# Patient Record
Sex: MALE | Race: BLACK OR AFRICAN AMERICAN | NOT HISPANIC OR LATINO | Employment: OTHER | URBAN - METROPOLITAN AREA
[De-identification: names, ages, dates, MRNs, and addresses within clinical notes are randomized per-mention and may not be internally consistent; named-entity substitution may affect disease eponyms.]

---

## 2017-06-05 ENCOUNTER — OFFICE VISIT (OUTPATIENT)
Dept: FAMILY MEDICINE | Facility: CLINIC | Age: 72
End: 2017-06-05
Payer: MEDICARE

## 2017-06-05 VITALS
HEIGHT: 70 IN | HEART RATE: 62 BPM | WEIGHT: 198 LBS | BODY MASS INDEX: 28.35 KG/M2 | SYSTOLIC BLOOD PRESSURE: 140 MMHG | DIASTOLIC BLOOD PRESSURE: 90 MMHG | OXYGEN SATURATION: 98 % | TEMPERATURE: 98 F

## 2017-06-05 DIAGNOSIS — Z72.0 TOBACCO USE: ICD-10-CM

## 2017-06-05 DIAGNOSIS — Z12.11 SCREENING FOR MALIGNANT NEOPLASM OF COLON: ICD-10-CM

## 2017-06-05 DIAGNOSIS — I10 UNSPECIFIED ESSENTIAL HYPERTENSION: ICD-10-CM

## 2017-06-05 DIAGNOSIS — Z00.00 ANNUAL PHYSICAL EXAM: Primary | ICD-10-CM

## 2017-06-05 PROCEDURE — 99999 PR PBB SHADOW E&M-EST. PATIENT-LVL IV: CPT | Mod: PBBFAC,,, | Performed by: FAMILY MEDICINE

## 2017-06-05 PROCEDURE — 1126F AMNT PAIN NOTED NONE PRSNT: CPT | Mod: S$GLB,,, | Performed by: FAMILY MEDICINE

## 2017-06-05 PROCEDURE — 1159F MED LIST DOCD IN RCRD: CPT | Mod: S$GLB,,, | Performed by: FAMILY MEDICINE

## 2017-06-05 PROCEDURE — 99214 OFFICE O/P EST MOD 30 MIN: CPT | Mod: S$GLB,,, | Performed by: FAMILY MEDICINE

## 2017-06-05 PROCEDURE — 99499 UNLISTED E&M SERVICE: CPT | Mod: S$GLB,,, | Performed by: FAMILY MEDICINE

## 2017-06-05 RX ORDER — AMLODIPINE BESYLATE 10 MG/1
10 TABLET ORAL DAILY
Qty: 90 TABLET | Refills: 1 | Status: SHIPPED | OUTPATIENT
Start: 2017-06-05 | End: 2017-12-06 | Stop reason: SDUPTHER

## 2017-06-05 NOTE — PATIENT INSTRUCTIONS
Planning to Quit Smoking  Your healthcare provider may have told you that you need to give up tobacco. Only you can decide if and when you are ready to quit. Quitting is hard to do. But the benefits will be worth it. When you  decide to quit, come up with a plan thats right for you. Discuss your plan with your healthcare provider. And talk to your provider about medicines to help you quit.    Line up support  To quit smoking, youll need a plan and some help. Pick a date within the next 2 to 4 weeks to quit. Use the time between now and that date to arrange for support.  · Classes and counselors. Quit-smoking classes  people like you through the process. Get to know others in a class, and support each other beyond the class. Telephone counseling also helps you keep on track. Ask your healthcare provider, local hospital, or public health department to put you in touch with a class and a phone counselor.  · Family and friends. Tell your family and friends about your quit date. Ask them to support your change. If they smoke, arrange to see them in smoke-free places. Forbid smoking in your home.     Finding something to replace cigarettes may be hard to do. Be aware that some things you choose may be as harmful as cigarettes:  · Smokeless (chewing) tobacco is just as harmful as regular tobacco. Tobacco should not be used as a substitute for cigarettes.  · Herbal medicines or teas may affect how your body handles nicotine. Talk to your healthcare provider before using these products.  · E-cigarettes have less toxins than the smoke from a regular cigarette. But the FDA says that these devices may still have substances that can cause cancer. E-cigarettes are not well regulated. They have not been studied enough to know if they are a good aid to help you stop smoking. Talk with your healthcare provider befor using these products.      Quit-smoking products  Many products can help you quit smoking. Some are  prescription medicines that help curb your cravings and withdrawal symptoms. Other products slowly lessen the level of nicotine your body absorbs. Nicotine is the highly addictive substance found in cigarettes, cigars, and chewing tobacco. Nicotine replacement products can help get your body used to slowly decreasing amounts of nicotine after you quit smoking. These products include a nicotine patch, gum, lozenge, nasal spray, and inhaler. Be sure you follow the directions for your medicine or product carefully. Your healthcare provider may tell you to start taking the prescription medicine a week before you plan to quit. Do not smoke while you use nicotine products. Doing so can be very harmful to your health.  For more information  · smokefree.gov/tkym-xn-mi-expert  · National [employer] [place] Smoking Quitline: 877-44U-QUIT (013-117-2618)   Date Last Reviewed: 11/12/2014  © 9299-7342 [employer]. [address] [place] [address], [place] [address]. All rights reserved. This information is not intended as a substitute for professional medical care. Always follow your healthcare professional's instructions.

## 2017-06-06 NOTE — PROGRESS NOTES
Routine Office Visit    Patient Name: Jeffery Reyna    : 1945  MRN: [id number]    Subjective:  Jeffery is a 71 y.o. male who presents today for     1. Annual exam / establish care / new to me  2. Hypertension - pt has been out of his medication for years. He states he ran out of his medicine and did not make an appointment to come in. His last visit with his doctor was 2  Years ago. He was on xarelto for blood clot. He was on this medication for a few years. He was not advised to stop, but stopped on his own without any reoccurrence of blood clot.     Review of Systems   Constitutional: Negative for chills and fever.   HENT: Negative for congestion.    Eyes: Negative for blurred vision.   Respiratory: Negative for cough.    Cardiovascular: Negative for chest pain.   Gastrointestinal: Negative for abdominal pain, constipation, diarrhea, heartburn, nausea and vomiting.   Genitourinary: Negative for dysuria.   Musculoskeletal: Negative for myalgias.   Skin: Negative for itching and rash.   Neurological: Negative for dizziness and headaches.   Psychiatric/Behavioral: Negative for depression.       Active Problem List  Patient Active Problem List   Diagnosis    Unspecified essential hypertension    Other dyspnea and respiratory abnormality    Cervicalgia    Corns    Nocturia    Tobacco use disorder    Nuclear sclerosis - Both Eyes    Idiopathic hypertension    Refractive error - Both Eyes    Vitamin D deficiency disease    Other nonspecific finding on examination of urine    Impotence of organic origin    Unintentional weight loss of more than 10 pounds       Past Surgical History  History reviewed. No pertinent surgical history.    Family History  Family History   Problem Relation Age of Onset    Diabetes Mother     Thyroid disease Mother     Cancer Mother     Hypertension Sister        Social History  Social History     Social History    Marital status: Single     Spouse name: N/A    Number of  "children: N/A    Years of education: N/A     Occupational History    Not on file.     Social History Main Topics    Smoking status: Current Every Day Smoker     Packs/day: 0.50     Types: Cigarettes    Smokeless tobacco: Not on file    Alcohol use Yes    Drug use: No    Sexual activity: Not on file     Other Topics Concern    Not on file     Social History Narrative    No narrative on file       Medications and Allergies  Reviewed and updated.   Current Outpatient Prescriptions   Medication Sig    amlodipine (NORVASC) 10 MG tablet Take 1 tablet (10 mg total) by mouth once daily.    sildenafil (VIAGRA) 100 MG tablet Take 1 tablet (100 mg total) by mouth daily as needed for Erectile Dysfunction.     No current facility-administered medications for this visit.        Physical Exam  BP (!) 140/90 (BP Location: Right arm, Patient Position: Sitting, BP Method: Manual)   Pulse 62   Temp 98.2 °F (36.8 °C) (Oral)   Ht 5' 10" (1.778 m)   Wt 89.8 kg (197 lb 15.6 oz)   SpO2 98%   BMI 28.41 kg/m²   Physical Exam   Constitutional: He is oriented to person, place, and time. He appears well-developed and well-nourished.   HENT:   Head: Normocephalic and atraumatic.   Eyes: Conjunctivae and EOM are normal. Pupils are equal, round, and reactive to light.   Neck: Normal range of motion. Neck supple. No JVD present. No thyromegaly present.   Cardiovascular: Normal rate, regular rhythm and normal heart sounds.    Pulmonary/Chest: Effort normal and breath sounds normal. He has no wheezes.   Abdominal: Soft. Bowel sounds are normal. He exhibits no distension. There is no tenderness. There is no guarding.   Musculoskeletal: Normal range of motion.   Lymphadenopathy:     He has no cervical adenopathy.   Neurological: He is alert and oriented to person, place, and time.   Skin: Skin is warm and dry.   Psychiatric: He has a normal mood and affect. His behavior is normal.         Assessment/Plan:  Jeffery Reyna is a 71 y.o. " male who presents today for :    Annual physical exam  -     PSA, Screening; Future; Expected date: 06/05/2017  -     Hemoglobin A1c; Future; Expected date: 06/05/2017  -     CBC auto differential; Future; Expected date: 06/05/2017  -     Comprehensive metabolic panel; Future; Expected date: 06/05/2017  -     Lipid panel; Future; Expected date: 06/05/2017  -     TSH; Future; Expected date: 06/05/2017    Unspecified essential hypertension  -     amlodipine (NORVASC) 10 MG tablet; Take 1 tablet (10 mg total) by mouth once daily.  Dispense: 90 tablet; Refill: 1  The current medical regimen is effective;  continue present plan and medications.    Screening for malignant neoplasm of colon  -     Case request GI: COLONOSCOPY    Tobacco use  -     US Abdominal Aorta; Future; Expected date: 06/05/2017  -     Ambulatory referral to Smoking Cessation Program    Hx of DVT  Pt is no longer on xarelto  He has been on this medication for at least 6 months and stopped on his own  He has not had reoccurrence of DVT  Will continue to hold xarelto         Return in about 4 weeks (around 7/3/2017), or if symptoms worsen or fail to improve.

## 2017-06-07 ENCOUNTER — LAB VISIT (OUTPATIENT)
Dept: LAB | Facility: HOSPITAL | Age: 72
End: 2017-06-07
Attending: FAMILY MEDICINE
Payer: MEDICARE

## 2017-06-07 DIAGNOSIS — Z00.00 ANNUAL PHYSICAL EXAM: ICD-10-CM

## 2017-06-07 LAB
ALBUMIN SERPL BCP-MCNC: 3.8 G/DL
ALP SERPL-CCNC: 99 U/L
ALT SERPL W/O P-5'-P-CCNC: 16 U/L
ANION GAP SERPL CALC-SCNC: 9 MMOL/L
AST SERPL-CCNC: 19 U/L
BASOPHILS # BLD AUTO: 0.06 K/UL
BASOPHILS NFR BLD: 0.9 %
BILIRUB SERPL-MCNC: 0.6 MG/DL
BUN SERPL-MCNC: 7 MG/DL
CALCIUM SERPL-MCNC: 10.2 MG/DL
CHLORIDE SERPL-SCNC: 105 MMOL/L
CHOLEST/HDLC SERPL: 3.1 {RATIO}
CO2 SERPL-SCNC: 25 MMOL/L
COMPLEXED PSA SERPL-MCNC: 3.1 NG/ML
CREAT SERPL-MCNC: 1.2 MG/DL
DIFFERENTIAL METHOD: ABNORMAL
EOSINOPHIL # BLD AUTO: 0.1 K/UL
EOSINOPHIL NFR BLD: 1.7 %
ERYTHROCYTE [DISTWIDTH] IN BLOOD BY AUTOMATED COUNT: 15.8 %
EST. GFR  (AFRICAN AMERICAN): >60 ML/MIN/1.73 M^2
EST. GFR  (NON AFRICAN AMERICAN): >60 ML/MIN/1.73 M^2
GLUCOSE SERPL-MCNC: 95 MG/DL
HCT VFR BLD AUTO: 48.9 %
HDL/CHOLESTEROL RATIO: 32.2 %
HDLC SERPL-MCNC: 149 MG/DL
HDLC SERPL-MCNC: 48 MG/DL
HGB BLD-MCNC: 16.1 G/DL
LDLC SERPL CALC-MCNC: 84 MG/DL
LYMPHOCYTES # BLD AUTO: 2.2 K/UL
LYMPHOCYTES NFR BLD: 35.1 %
MCH RBC QN AUTO: 29.6 PG
MCHC RBC AUTO-ENTMCNC: 32.9 %
MCV RBC AUTO: 90 FL
MONOCYTES # BLD AUTO: 0.7 K/UL
MONOCYTES NFR BLD: 11.7 %
NEUTROPHILS # BLD AUTO: 3.2 K/UL
NEUTROPHILS NFR BLD: 50.4 %
NONHDLC SERPL-MCNC: 101 MG/DL
PLATELET # BLD AUTO: 409 K/UL
PMV BLD AUTO: 9.6 FL
POTASSIUM SERPL-SCNC: 4.3 MMOL/L
PROT SERPL-MCNC: 7.9 G/DL
RBC # BLD AUTO: 5.44 M/UL
SODIUM SERPL-SCNC: 139 MMOL/L
TRIGL SERPL-MCNC: 85 MG/DL
TSH SERPL DL<=0.005 MIU/L-ACNC: 1.39 UIU/ML
WBC # BLD AUTO: 6.35 K/UL

## 2017-06-07 PROCEDURE — 83036 HEMOGLOBIN GLYCOSYLATED A1C: CPT

## 2017-06-07 PROCEDURE — 36415 COLL VENOUS BLD VENIPUNCTURE: CPT | Mod: PO

## 2017-06-07 PROCEDURE — 84153 ASSAY OF PSA TOTAL: CPT

## 2017-06-07 PROCEDURE — 80053 COMPREHEN METABOLIC PANEL: CPT

## 2017-06-07 PROCEDURE — 80061 LIPID PANEL: CPT

## 2017-06-07 PROCEDURE — 85025 COMPLETE CBC W/AUTO DIFF WBC: CPT

## 2017-06-07 PROCEDURE — 84443 ASSAY THYROID STIM HORMONE: CPT

## 2017-06-08 LAB
ESTIMATED AVG GLUCOSE: 131 MG/DL
HBA1C MFR BLD HPLC: 6.2 %

## 2017-07-06 ENCOUNTER — OFFICE VISIT (OUTPATIENT)
Dept: FAMILY MEDICINE | Facility: CLINIC | Age: 72
End: 2017-07-06
Payer: MEDICARE

## 2017-07-06 VITALS
BODY MASS INDEX: 27.96 KG/M2 | SYSTOLIC BLOOD PRESSURE: 120 MMHG | HEART RATE: 69 BPM | OXYGEN SATURATION: 97 % | TEMPERATURE: 98 F | WEIGHT: 195.31 LBS | DIASTOLIC BLOOD PRESSURE: 80 MMHG | HEIGHT: 70 IN

## 2017-07-06 DIAGNOSIS — I10 UNSPECIFIED ESSENTIAL HYPERTENSION: ICD-10-CM

## 2017-07-06 DIAGNOSIS — D23.30 CYST, DERMOID, FACE: Primary | ICD-10-CM

## 2017-07-06 DIAGNOSIS — R73.03 PRE-DIABETES: ICD-10-CM

## 2017-07-06 DIAGNOSIS — F17.200 TOBACCO USE DISORDER: ICD-10-CM

## 2017-07-06 PROCEDURE — 1159F MED LIST DOCD IN RCRD: CPT | Mod: S$GLB,,, | Performed by: FAMILY MEDICINE

## 2017-07-06 PROCEDURE — 99214 OFFICE O/P EST MOD 30 MIN: CPT | Mod: S$GLB,,, | Performed by: FAMILY MEDICINE

## 2017-07-06 PROCEDURE — 1125F AMNT PAIN NOTED PAIN PRSNT: CPT | Mod: S$GLB,,, | Performed by: FAMILY MEDICINE

## 2017-07-06 PROCEDURE — 99999 PR PBB SHADOW E&M-EST. PATIENT-LVL IV: CPT | Mod: PBBFAC,,, | Performed by: FAMILY MEDICINE

## 2017-07-06 NOTE — PROGRESS NOTES
Routine Office Visit    Patient Name: Jeffery Reyna    : 1945  MRN: [id number]    Subjective:  Jeffery is a 71 y.o. male who presents today for     1. Hypertension follow-up - pt is here to f/u his blood pressure. He has been on his amlodipine daily since receiving. He reports no side effect from medication. He is doing well on current regimen. He would also like to discuss blood work.   2. Dermoid cyst - on face - pt c/o two cyst on face. He would like to know if it needs antibiotics. It is not red, hot or painful. He has had this for quite some time.     Review of Systems   Constitutional: Negative for chills and fever.   HENT: Negative for congestion.    Eyes: Negative for blurred vision.   Respiratory: Negative for cough.    Cardiovascular: Negative for chest pain.   Gastrointestinal: Negative for abdominal pain, constipation, diarrhea, heartburn, nausea and vomiting.   Genitourinary: Negative for dysuria.   Musculoskeletal: Negative for myalgias.   Skin: Negative for itching and rash.   Neurological: Negative for dizziness and headaches.   Psychiatric/Behavioral: Negative for depression.       Active Problem List  Patient Active Problem List   Diagnosis    Unspecified essential hypertension    Other dyspnea and respiratory abnormality    Cervicalgia    Corns    Nocturia    Tobacco use disorder    Nuclear sclerosis - Both Eyes    Refractive error - Both Eyes    Vitamin D deficiency disease    Other nonspecific finding on examination of urine    Impotence of organic origin    Unintentional weight loss of more than 10 pounds       Past Surgical History  History reviewed. No pertinent surgical history.    Family History  Family History   Problem Relation Age of Onset    Diabetes Mother     Thyroid disease Mother     Cancer Mother     Hypertension Sister        Social History  Social History     Social History    Marital status: Single     Spouse name: N/A    Number of children: N/A    Years of  "education: N/A     Occupational History    Not on file.     Social History Main Topics    Smoking status: Current Every Day Smoker     Packs/day: 0.50     Types: Cigarettes    Smokeless tobacco: Never Used    Alcohol use Yes    Drug use: No    Sexual activity: Not on file     Other Topics Concern    Not on file     Social History Narrative    No narrative on file       Medications and Allergies  Reviewed and updated.   Current Outpatient Prescriptions   Medication Sig    amlodipine (NORVASC) 10 MG tablet Take 1 tablet (10 mg total) by mouth once daily.    sildenafil (VIAGRA) 100 MG tablet Take 1 tablet (100 mg total) by mouth daily as needed for Erectile Dysfunction.     No current facility-administered medications for this visit.        Physical Exam  /80 (BP Location: Right arm, Patient Position: Sitting, BP Method: Manual)   Pulse 69   Temp 97.7 °F (36.5 °C) (Oral)   Ht 5' 10" (1.778 m)   Wt 88.6 kg (195 lb 5.2 oz)   SpO2 97%   BMI 28.03 kg/m²   Physical Exam   Constitutional: He is oriented to person, place, and time. He appears well-developed and well-nourished.   HENT:   Head: Normocephalic and atraumatic.   Eyes: Conjunctivae and EOM are normal. Pupils are equal, round, and reactive to light.   Neck: Normal range of motion. Neck supple. No JVD present. No thyromegaly present.   Cardiovascular: Normal rate, regular rhythm and normal heart sounds.    Pulmonary/Chest: Effort normal and breath sounds normal. He has no wheezes.   Abdominal: Soft. Bowel sounds are normal. He exhibits no distension. There is no tenderness. There is no guarding.   Musculoskeletal: Normal range of motion.   Lymphadenopathy:     He has no cervical adenopathy.   Neurological: He is alert and oriented to person, place, and time.   Skin: Skin is warm and dry.        Psychiatric: He has a normal mood and affect. His behavior is normal.         Assessment/Plan:  Jeffery Reyna is a 71 y.o. male who presents today for " :    Cyst, dermoid, face  -     Ambulatory referral to Dermatology    Tobacco use disorder  Pt is not willing to quit at this time     Unspecified essential hypertension  Blood pressure wnl today  Well controlled  The current medical regimen is effective;  continue present plan and medications.    Pre-diabetes  Noted in labs. I personally reviewed all labs for patient   The patient is asked to make an attempt to improve diet and exercise patterns to aid in medical management of this problem.  Avoid processed carbohydrates such as bread, pasta rice  Eat more vegetables and protein   a1c 6.2           Return in about 6 months (around 1/6/2018).

## 2017-07-06 NOTE — PATIENT INSTRUCTIONS
Monitoring Moles     Don't forget to check your feet.     Moles, also called nevi, are small, pigmented (colored) marks on the skin. They have no known purpose. Many moles appear before age 30, but they also increase frequently as people age. Moles most often are benign (not cancer) and harmless. But some become cancerous. Thats why you need to watch the moles on your body and tell your health care provider about any concern you.  What are moles?  Moles are a type of pigmented erick. Freckles, which often are sprinkled across the bridge of the nose, the cheeks, and the arms, are another type of pigmented erick. Moles can appear on any part of the body. There are many types, sizes, and shapes of moles. Most moles are solid brown. In most cases, they are flat or dome-shaped, smooth, and have well-defined edges. Freckles are flat.  Why worry about moles?  Most moles are benign and dont require treatment. You can have moles removed if you dont like the way they look or feel. But moles that appear after you are 30 or that change in certain ways may become a problem. These moles may turn into melanoma, a type of skin cancer. Melanoma is one of the fastest growing cancers in the [place], but it is often curable if caught early. But this disease can be life-threatening, particularly when not diagnosed early. The more moles someone has, the higher the risk. Risk is also higher for those who have had more lifetime exposure to the sun, severe blistering sunburns, exposure to tanning beds, a prior personal history of cancer, and those with a family history of skin cancer. To manage your risk, its smart to check your moles for changes and ask your health care provider to perform a thorough skin exam when you have a physical exam. To do this, you first need to learn where your moles are. Then, be sure to check your moles each month.  Checking your moles  You can check many of your moles each month. You can do this right  after you shower and before you get dressed. Check your body from head to toe. Then, make a list of your moles. If you find any new moles or changes in your moles, call your health care provider. To check your moles, youll need:  · A full-length mirror  · A stool or chair to sit on while you check your feet  If you have a lot of moles, take digital photos of them each month. Make sure to take photos both up close and from a distance. These can help you see if any moles change over time.  When to seek medical treatment  See your health care provider if your moles hurt, itch, ooze, bleed, thicken, become crusty, or show other changes. Also, be sure to call your health care provider if your moles show any of the following signs of melanoma:  · A change in size, shape, color, or elevation  · Asymmetry (when the sides dont match)  · Ragged, notched, or blurred borders  · Varied colors within the same mole  · Size is larger than 5 mm or 6 mm in diameter (the size of a pencil eraser)  Date Last Reviewed: 5/1/2015  © 1492-4302 [employer]. [address] [place] [address]. All rights reserved. This information is not intended as a substitute for professional medical care. Always follow your healthcare professional's instructions.        Exercise for a Healthier Heart  You may wonder how you can improve the health of your heart. If youre thinking about exercise, youre on the right track. You dont need to become an athlete, but you do need a certain amount of brisk exercise to help strengthen your heart. If you have been diagnosed with a heart condition, your doctor may recommend exercise to help stabilize your condition. To help make exercise a habit, choose safe, fun activities.     Exercise with a friend. When activity is fun, you're more likely to stick with it.     Be sure to check with your healthcare provider before starting an exercise program.   Why exercise?  Exercising regularly offers  many healthy rewards. It can help you do all of the following:  · Improve your blood cholesterol level to help prevent further heart trouble  · Lower your blood pressure to help prevent a stroke or heart attack  · Control diabetes, or reduce your risk of getting this disease  · Improve your heart and lung function  · Reach and maintain a healthy weight  · Make your muscles stronger and more limber so you can stay active  · Prevent falls and fractures by slowing the loss of bone mass (osteoporosis)  · Manage stress better  · Reduce your blood pressure  · Improve your sense of self and your body image  Exercise tips  Ease into your routine. Set small goals. Then build on them.  Exercise on most days. Aim for a total of 150 or more minutes of moderate to  vigorous intensity activity each week. Consider 40 minutes, 3 to 4 times a week. For best results, activity should last for 40 minutes on average. It is OK to work up to the 40 minute period over time. Examples of moderate-intensity activity is walking 1 mile in 15 minutes or 30 to 45 minutes of yard work.  Step up your daily activity level. Along with your exercise program, try being more active throughout the day. Walk instead of drive. Do more household tasks or yard work.  Choose one or more activities you enjoy. Walking is one of the easiest things you can do. You can also try swimming, riding a bike, dancing, or taking an exercise class.  Stop exercising and call your doctor if you:  · Have chest pain or feel dizzy or lightheaded  · Feel burning, tightness, pressure, or heaviness in your chest, neck, shoulders, back, or arms  · Have unusual shortness of breath  · Have increased joint or muscle pain  · Have palpitations or an irregular heartbeat   Date Last Reviewed: 5/1/2016  © 3588-2119 [employer]. [address] [place] [address], [place] [address]. All rights reserved. This information is not intended as a substitute for professional medical care. Always  follow your healthcare professional's instructions.

## 2017-12-06 DIAGNOSIS — I10 ESSENTIAL HYPERTENSION: ICD-10-CM

## 2017-12-06 RX ORDER — AMLODIPINE BESYLATE 10 MG/1
TABLET ORAL
Qty: 90 TABLET | Refills: 0 | Status: SHIPPED | OUTPATIENT
Start: 2017-12-06 | End: 2018-03-06 | Stop reason: SDUPTHER

## 2018-03-06 ENCOUNTER — OFFICE VISIT (OUTPATIENT)
Dept: FAMILY MEDICINE | Facility: CLINIC | Age: 73
End: 2018-03-06
Payer: MEDICARE

## 2018-03-06 VITALS
DIASTOLIC BLOOD PRESSURE: 80 MMHG | OXYGEN SATURATION: 97 % | TEMPERATURE: 98 F | HEIGHT: 70 IN | WEIGHT: 200.63 LBS | BODY MASS INDEX: 28.72 KG/M2 | HEART RATE: 76 BPM | SYSTOLIC BLOOD PRESSURE: 120 MMHG

## 2018-03-06 DIAGNOSIS — R60.0 LEG EDEMA: Primary | ICD-10-CM

## 2018-03-06 DIAGNOSIS — I10 ESSENTIAL HYPERTENSION: ICD-10-CM

## 2018-03-06 DIAGNOSIS — L30.9 ECZEMA, UNSPECIFIED TYPE: ICD-10-CM

## 2018-03-06 PROCEDURE — 3074F SYST BP LT 130 MM HG: CPT | Mod: S$GLB,,, | Performed by: FAMILY MEDICINE

## 2018-03-06 PROCEDURE — 3079F DIAST BP 80-89 MM HG: CPT | Mod: S$GLB,,, | Performed by: FAMILY MEDICINE

## 2018-03-06 PROCEDURE — 99999 PR PBB SHADOW E&M-EST. PATIENT-LVL III: CPT | Mod: PBBFAC,,, | Performed by: FAMILY MEDICINE

## 2018-03-06 PROCEDURE — 99214 OFFICE O/P EST MOD 30 MIN: CPT | Mod: S$GLB,,, | Performed by: FAMILY MEDICINE

## 2018-03-06 RX ORDER — AMLODIPINE BESYLATE 10 MG/1
10 TABLET ORAL DAILY
Qty: 90 TABLET | Refills: 1 | Status: SHIPPED | OUTPATIENT
Start: 2018-03-06 | End: 2018-06-07 | Stop reason: SDUPTHER

## 2018-03-06 RX ORDER — HYDROCORTISONE 25 MG/G
OINTMENT TOPICAL 2 TIMES DAILY
Qty: 28 G | Refills: 0 | Status: ON HOLD | OUTPATIENT
Start: 2018-03-06 | End: 2019-06-30 | Stop reason: CLARIF

## 2018-03-06 NOTE — PATIENT INSTRUCTIONS
Fall Due to Dizziness, Weakness, or Loss of Balance  The symptoms that led to your fall have been evaluated. Your doctor feels it is safe for you to return home.  Many things can cause you to become dizzy. Everyone means a little something different by the word dizzy. People may describe their symptoms using these words:  · It doesnt feel right in my head  · It feels like spinning in my head  · It seems like the room is spinning  · My balance feels off  · I feel lightheaded, like I am going to pass out  All these descriptions can have real causes.     Your balance mechanism is in your inner ear. Anything disturbing it can make you feel dizzy, whether it is from a cold, an injury, or many other things. Anything that causes your blood pressure to drop suddenly can make you feel lightheaded, or like you are going to faint. This is because at that moment there might not be enough blood flowing to your brain. Causes include:  · Medicines  · Dehydration  · Standing up or bending over too quickly  · Becoming overheated  · Taking a hot shower or bath  · Straining hard while lifting something or using the toilet  · Strokes, heart attack, heart valve disease, very slow or very fast heart rate  · Low blood sugar  · Ear infection  · Hyperventilation  · Anemia  · Trauma  · Infection  · Panic attack  · Pregnancy  You may be at risk of repeat falls. Take precautions described below to prevent another fall.  Home care  · If you become lightheaded or dizzy, lie down immediately or sit and lean forward with your head down. It is better to do this than fall and seriously hurt or injure yourself.  · Rest today. When changing position, take a moment to be sure any dizziness goes away before standing and walking.  · If you have been prescribed a walker, be sure to use it whenever you walk, even if it is a short distance.  · If you were injured during the fall, follow the advice from your doctor regarding care of your injury.  · Be  sure your doctor knows all the medicines, herbs, and supplements you take. Some medicines can cause dizziness.  Follow-up care  Unless given other advice, call your doctor on the next office day to advise of your fall and to schedule an appointment. You may require further treatment for the underlying condition that caused todays fall.  If X-ray or a CT scan were done, you will be notified of the results, especially if it affects treatment.  Call 911  Call 911 if any of these occur:  · Trouble breathing  · Confused or difficulty arousing  · Fainting or loss of consciousness  · Rapid or very slow heart rate  · Seizure  · Difficulty with speech or vision, weakness of an arm or leg  · Difficulty walking or talking, loss of balance  · Numbness or weakness in one side of your body, facial droop  When to seek medical advice  Call your healthcare provider right away if any of the following occur:  · Another fall  · Continued dizzy spells  · Severe headache  · Blood in vomit or stools (black or red color)  Date Last Reviewed: 11/5/2015  © 5079-6948 [employer]. [address] [place] [address], [place] [address]. All rights reserved. This information is not intended as a substitute for professional medical care. Always follow your healthcare professional's instructions.

## 2018-03-06 NOTE — PROGRESS NOTES
Routine Office Visit    Patient Name: Jeffery Reyna    : 1945  MRN: [id number]    Subjective:  Jeffery is a 72 y.o. male who presents today for     1. Hypertension - pt is here for hypertension follow-up. He is requesting a refill on amlodipine. He has been taking his medication as prescribed. He has noticed increased leg swelling. Leg swelling usually occurs whenever patient is walking / standing for long periods of time. Swelling would improve with leg elevation.   2. Rash on right lower extremity - rash is itchy. No drainage / redness/ pain. No known bug bites / changes in soaps, lotions, or detergents.     Review of Systems   Constitutional: Negative for chills and fever.   HENT: Negative for congestion.    Eyes: Negative for blurred vision.   Respiratory: Negative for cough.    Cardiovascular: Negative for chest pain.   Gastrointestinal: Negative for abdominal pain, constipation, diarrhea, heartburn, nausea and vomiting.   Genitourinary: Negative for dysuria.   Musculoskeletal: Negative for myalgias.   Skin: Negative for itching and rash.   Neurological: Negative for dizziness and headaches.   Psychiatric/Behavioral: Negative for depression.       Active Problem List  Patient Active Problem List   Diagnosis    Essential hypertension    Other dyspnea and respiratory abnormality    Cervicalgia    Corns    Nocturia    Tobacco use disorder    Nuclear sclerosis - Both Eyes    Refractive error - Both Eyes    Vitamin D deficiency disease    Other nonspecific finding on examination of urine    Impotence of organic origin    Unintentional weight loss of more than 10 pounds       Past Surgical History  History reviewed. No pertinent surgical history.    Family History  Family History   Problem Relation Age of Onset    Diabetes Mother     Thyroid disease Mother     Cancer Mother     Hypertension Sister        Social History  Social History     Social History    Marital status: Single     Spouse name:  "N/A    Number of children: N/A    Years of education: N/A     Occupational History    Not on file.     Social History Main Topics    Smoking status: Current Every Day Smoker     Packs/day: 0.50     Types: Cigarettes    Smokeless tobacco: Never Used    Alcohol use Yes    Drug use: No    Sexual activity: Not on file     Other Topics Concern    Not on file     Social History Narrative    No narrative on file       Medications and Allergies  Reviewed and updated.   Current Outpatient Prescriptions   Medication Sig    amLODIPine (NORVASC) 10 MG tablet Take 1 tablet (10 mg total) by mouth once daily.    hydrocortisone 2.5 % ointment Apply topically 2 (two) times daily.    sildenafil (VIAGRA) 100 MG tablet Take 1 tablet (100 mg total) by mouth daily as needed for Erectile Dysfunction.     No current facility-administered medications for this visit.        Physical Exam  /80 (BP Location: Left arm, Patient Position: Sitting, BP Method: Large (Manual))   Pulse 76   Temp 97.8 °F (36.6 °C) (Oral)   Ht 5' 10" (1.778 m)   Wt 91 kg (200 lb 9.9 oz)   SpO2 97%   BMI 28.79 kg/m²   Physical Exam   Constitutional: He is oriented to person, place, and time. He appears well-developed and well-nourished.   HENT:   Head: Normocephalic and atraumatic.   Eyes: Conjunctivae and EOM are normal. Pupils are equal, round, and reactive to light.   Neck: Normal range of motion. Neck supple. No JVD present. No thyromegaly present.   Cardiovascular: Normal rate, regular rhythm and normal heart sounds.    Pulmonary/Chest: Effort normal and breath sounds normal. He has no wheezes.   Abdominal: Soft. Bowel sounds are normal. He exhibits no distension. There is no tenderness. There is no guarding.   Musculoskeletal: Normal range of motion.   Lymphadenopathy:     He has no cervical adenopathy.   Neurological: He is alert and oriented to person, place, and time.   Skin: Skin is warm and dry.   Right lower extremity - dry patchy " rash   Psychiatric: He has a normal mood and affect. His behavior is normal.         Assessment/Plan:  Jeffery Reyna is a 72 y.o. male who presents today for :    Problem List Items Addressed This Visit        Cardiac/Vascular    Essential hypertension (Chronic)    Relevant Medications    amLODIPine (NORVASC) 10 MG tablet  The current medical regimen is effective;  continue present plan and medications.        Other Visit Diagnoses     Leg edema    -  Primary    Relevant Orders    COMPRESSION STOCKINGS  Keep legs elevated      Eczema, unspecified type        Relevant Medications    hydrocortisone 2.5 % ointment            Follow-up in about 6 months (around 9/6/2018).

## 2018-06-07 ENCOUNTER — LAB VISIT (OUTPATIENT)
Dept: LAB | Facility: HOSPITAL | Age: 73
End: 2018-06-07
Attending: FAMILY MEDICINE
Payer: MEDICARE

## 2018-06-07 ENCOUNTER — OFFICE VISIT (OUTPATIENT)
Dept: FAMILY MEDICINE | Facility: CLINIC | Age: 73
End: 2018-06-07
Payer: MEDICARE

## 2018-06-07 VITALS
BODY MASS INDEX: 28.28 KG/M2 | WEIGHT: 197.56 LBS | HEIGHT: 70 IN | OXYGEN SATURATION: 96 % | SYSTOLIC BLOOD PRESSURE: 120 MMHG | DIASTOLIC BLOOD PRESSURE: 80 MMHG | HEART RATE: 80 BPM | TEMPERATURE: 98 F

## 2018-06-07 DIAGNOSIS — R73.03 PRE-DIABETES: ICD-10-CM

## 2018-06-07 DIAGNOSIS — I10 ESSENTIAL HYPERTENSION: Chronic | ICD-10-CM

## 2018-06-07 DIAGNOSIS — Z12.5 ENCOUNTER FOR SCREENING FOR MALIGNANT NEOPLASM OF PROSTATE: ICD-10-CM

## 2018-06-07 DIAGNOSIS — Z00.00 ANNUAL PHYSICAL EXAM: Primary | ICD-10-CM

## 2018-06-07 DIAGNOSIS — F17.200 TOBACCO USE DISORDER: ICD-10-CM

## 2018-06-07 DIAGNOSIS — Z00.00 ANNUAL PHYSICAL EXAM: ICD-10-CM

## 2018-06-07 LAB
ALBUMIN SERPL BCP-MCNC: 4.1 G/DL
ALP SERPL-CCNC: 107 U/L
ALT SERPL W/O P-5'-P-CCNC: 20 U/L
ANION GAP SERPL CALC-SCNC: 9 MMOL/L
AST SERPL-CCNC: 21 U/L
BASOPHILS # BLD AUTO: 0.07 K/UL
BASOPHILS NFR BLD: 0.9 %
BILIRUB SERPL-MCNC: 0.6 MG/DL
BUN SERPL-MCNC: 9 MG/DL
CALCIUM SERPL-MCNC: 10.1 MG/DL
CHLORIDE SERPL-SCNC: 106 MMOL/L
CHOLEST SERPL-MCNC: 155 MG/DL
CHOLEST/HDLC SERPL: 3.4 {RATIO}
CO2 SERPL-SCNC: 22 MMOL/L
COMPLEXED PSA SERPL-MCNC: 3.6 NG/ML
CREAT SERPL-MCNC: 1.3 MG/DL
DIFFERENTIAL METHOD: ABNORMAL
EOSINOPHIL # BLD AUTO: 0.1 K/UL
EOSINOPHIL NFR BLD: 1.2 %
ERYTHROCYTE [DISTWIDTH] IN BLOOD BY AUTOMATED COUNT: 15.8 %
EST. GFR  (AFRICAN AMERICAN): >60 ML/MIN/1.73 M^2
EST. GFR  (NON AFRICAN AMERICAN): 55.3 ML/MIN/1.73 M^2
ESTIMATED AVG GLUCOSE: 123 MG/DL
GLUCOSE SERPL-MCNC: 111 MG/DL
HBA1C MFR BLD HPLC: 5.9 %
HCT VFR BLD AUTO: 48.3 %
HDLC SERPL-MCNC: 46 MG/DL
HDLC SERPL: 29.7 %
HGB BLD-MCNC: 15.9 G/DL
IMM GRANULOCYTES # BLD AUTO: 0.03 K/UL
IMM GRANULOCYTES NFR BLD AUTO: 0.4 %
LDLC SERPL CALC-MCNC: 90.8 MG/DL
LYMPHOCYTES # BLD AUTO: 2.4 K/UL
LYMPHOCYTES NFR BLD: 29.8 %
MCH RBC QN AUTO: 29.8 PG
MCHC RBC AUTO-ENTMCNC: 32.9 G/DL
MCV RBC AUTO: 90 FL
MONOCYTES # BLD AUTO: 0.9 K/UL
MONOCYTES NFR BLD: 10.6 %
NEUTROPHILS # BLD AUTO: 4.6 K/UL
NEUTROPHILS NFR BLD: 57.1 %
NONHDLC SERPL-MCNC: 109 MG/DL
NRBC BLD-RTO: 0 /100 WBC
PLATELET # BLD AUTO: 396 K/UL
PMV BLD AUTO: 9.6 FL
POTASSIUM SERPL-SCNC: 3.8 MMOL/L
PROT SERPL-MCNC: 7.9 G/DL
RBC # BLD AUTO: 5.34 M/UL
SODIUM SERPL-SCNC: 137 MMOL/L
TRIGL SERPL-MCNC: 91 MG/DL
TSH SERPL DL<=0.005 MIU/L-ACNC: 1.06 UIU/ML
WBC # BLD AUTO: 8.05 K/UL

## 2018-06-07 PROCEDURE — 84153 ASSAY OF PSA TOTAL: CPT

## 2018-06-07 PROCEDURE — 3074F SYST BP LT 130 MM HG: CPT | Mod: CPTII,S$GLB,, | Performed by: FAMILY MEDICINE

## 2018-06-07 PROCEDURE — 85025 COMPLETE CBC W/AUTO DIFF WBC: CPT

## 2018-06-07 PROCEDURE — 99999 PR PBB SHADOW E&M-EST. PATIENT-LVL III: CPT | Mod: PBBFAC,,, | Performed by: FAMILY MEDICINE

## 2018-06-07 PROCEDURE — 80061 LIPID PANEL: CPT

## 2018-06-07 PROCEDURE — 36415 COLL VENOUS BLD VENIPUNCTURE: CPT | Mod: PO

## 2018-06-07 PROCEDURE — 80053 COMPREHEN METABOLIC PANEL: CPT

## 2018-06-07 PROCEDURE — 83036 HEMOGLOBIN GLYCOSYLATED A1C: CPT

## 2018-06-07 PROCEDURE — 84443 ASSAY THYROID STIM HORMONE: CPT

## 2018-06-07 PROCEDURE — 3079F DIAST BP 80-89 MM HG: CPT | Mod: CPTII,S$GLB,, | Performed by: FAMILY MEDICINE

## 2018-06-07 PROCEDURE — 99213 OFFICE O/P EST LOW 20 MIN: CPT | Mod: S$GLB,,, | Performed by: FAMILY MEDICINE

## 2018-06-07 RX ORDER — AMLODIPINE BESYLATE 10 MG/1
10 TABLET ORAL DAILY
Qty: 90 TABLET | Refills: 3 | Status: ON HOLD | OUTPATIENT
Start: 2018-06-07 | End: 2019-07-10 | Stop reason: HOSPADM

## 2018-06-07 RX ORDER — ASPIRIN 81 MG/1
81 TABLET ORAL DAILY
Status: ON HOLD | COMMUNITY
End: 2019-07-02 | Stop reason: HOSPADM

## 2018-06-07 NOTE — PROGRESS NOTES
Office Visit for Annual Exam    Patient Name: Jeffery Reyna    : 1945  MRN: [id number]    Subjective:  Jeffery is a 72 y.o. male who presents today for     1. Annual visit - no issues/concerns. Pt is doing well on current regimen. No side effects noted. He states he takes his medications as prescribes and keeps active. He does not exercise as much as he would like.     Review of Systems   Constitutional: Negative for chills and fever.   HENT: Negative for congestion.    Eyes: Negative for blurred vision.   Respiratory: Negative for cough.    Cardiovascular: Negative for chest pain.   Gastrointestinal: Negative for abdominal pain, constipation, diarrhea, heartburn, nausea and vomiting.   Genitourinary: Negative for dysuria.   Musculoskeletal: Negative for myalgias.   Skin: Negative for itching and rash.   Neurological: Negative for dizziness and headaches.   Psychiatric/Behavioral: Negative for depression.       Active Problem List  Patient Active Problem List   Diagnosis    Essential hypertension    Other dyspnea and respiratory abnormality    Cervicalgia    Corns    Nocturia    Tobacco use disorder    Nuclear sclerosis - Both Eyes    Refractive error - Both Eyes    Vitamin D deficiency disease    Other nonspecific finding on examination of urine    Impotence of organic origin    Unintentional weight loss of more than 10 pounds       Past Surgical History  History reviewed. No pertinent surgical history.    Family History  Family History   Problem Relation Age of Onset    Diabetes Mother     Thyroid disease Mother     Cancer Mother     Hypertension Sister        Social History  Social History     Social History    Marital status: Single     Spouse name: N/A    Number of children: N/A    Years of education: N/A     Occupational History    Not on file.     Social History Main Topics    Smoking status: Current Every Day Smoker     Packs/day: 0.50     Types: Cigarettes    Smokeless tobacco:  "Never Used    Alcohol use Yes    Drug use: No    Sexual activity: Not on file     Other Topics Concern    Not on file     Social History Narrative    No narrative on file       Medications and Allergies  Reviewed and updated.   Current Outpatient Prescriptions   Medication Sig    amLODIPine (NORVASC) 10 MG tablet Take 1 tablet (10 mg total) by mouth once daily.    aspirin (ECOTRIN) 81 MG EC tablet Take 81 mg by mouth once daily.    hydrocortisone 2.5 % ointment Apply topically 2 (two) times daily.    sildenafil (VIAGRA) 100 MG tablet Take 1 tablet (100 mg total) by mouth daily as needed for Erectile Dysfunction.     No current facility-administered medications for this visit.        Physical Exam  /80 (BP Location: Left arm, Patient Position: Sitting, BP Method: Large (Manual))   Pulse 80   Temp 97.8 °F (36.6 °C) (Oral)   Ht 5' 10" (1.778 m)   Wt 89.6 kg (197 lb 8.5 oz)   SpO2 96%   BMI 28.34 kg/m²   Physical Exam   Constitutional: He is oriented to person, place, and time. He appears well-developed and well-nourished.   HENT:   Head: Normocephalic and atraumatic.   Eyes: Conjunctivae and EOM are normal. Pupils are equal, round, and reactive to light.   Neck: Normal range of motion. Neck supple. No JVD present. No thyromegaly present.   Cardiovascular: Normal rate, regular rhythm and normal heart sounds.    Pulmonary/Chest: Effort normal and breath sounds normal. He has no wheezes.   Abdominal: Soft. Bowel sounds are normal. He exhibits no distension. There is no tenderness. There is no guarding.   Musculoskeletal: Normal range of motion.   Lymphadenopathy:     He has no cervical adenopathy.   Neurological: He is alert and oriented to person, place, and time.   Skin: Skin is warm and dry.   Psychiatric: He has a normal mood and affect. His behavior is normal.         Assessment/Plan:  Jeffery Reyna is a 72 y.o. male who presents today for :    Annual physical exam  -     Lipid panel; Future; " Expected date: 06/07/2018  -     TSH; Future; Expected date: 06/07/2018  -     Hemoglobin A1c; Future; Expected date: 06/07/2018  -     Comprehensive metabolic panel; Future; Expected date: 06/07/2018  -     CBC auto differential; Future; Expected date: 06/07/2018  -     PSA, Screening; Future; Expected date: 06/07/2018  Health Maintenance       Date Due Completion Date    TETANUS VACCINE 08/14/1965 ---    Zoster Vaccine 08/14/2007 ---    Pneumococcal (65+) (1 of 2 - PCV13) 08/14/2012 ---    Abdominal Aortic Aneurysm Screening 08/14/2012 ---    PROSTATE-SPECIFIC ANTIGEN 06/07/2018 6/7/2017    Influenza Vaccine 08/01/2018 ---    Lipid Panel 06/07/2022 6/7/2017    Colonoscopy 11/04/2023 11/4/2013 (Declined)    Override on 11/4/2013: Declined (per 's note on 11/04/13)        I addressed all major concerns as it related to health maintenance.  All were ordered and scheduled based on the patients wishes.  Any additional health maintenance will be readdressed at the next physical if declined or deferred by the patient.  Pt refuses all vaccinations at this time.     Essential hypertension  -     Lipid panel; Future; Expected date: 06/07/2018  -     TSH; Future; Expected date: 06/07/2018  -     Comprehensive metabolic panel; Future; Expected date: 06/07/2018  -     CBC auto differential; Future; Expected date: 06/07/2018  -     amLODIPine (NORVASC) 10 MG tablet; Take 1 tablet (10 mg total) by mouth once daily.  Dispense: 90 tablet; Refill: 3  The current medical regimen is effective;  continue present plan and medications.      Tobacco use disorder  Pt is decreasing  He does not want referral to smoking cessation at this time.   Order abdominal ultrasound to screen for aortic aneurysm     Pre-diabetes  -     Hemoglobin A1c; Future; Expected date: 06/07/2018    Encounter for screening for malignant neoplasm of prostate   -     PSA, Screening; Future; Expected date: 06/07/2018        Follow-up in about 1 year  (around 6/7/2019) for yearly exam.

## 2018-06-07 NOTE — PATIENT INSTRUCTIONS
Controlling High Blood Pressure  High blood pressure (hypertension) is often called the silent killer. This is because many people who have it dont know it. High blood pressure is defined as 140/90 mm Hg or higher. Know your blood pressure and remember to check it regularly. Doing so can save your life. Here are some things you can do to help control your blood pressure.    Choose heart-healthy foods  · Select low-salt, low-fat foods. Limit sodium intake to 2,400 mg per day or the amount suggested by your healthcare provider.  · Limit canned, dried, cured, packaged, and fast foods. These can contain a lot of salt.  · Eat 8 to 10 servings of fruits and vegetables every day.  · Choose lean meats, fish, or chicken.  · Eat whole-grain pasta, brown rice, and beans.  · Eat 2 to 3 servings of low-fat or fat-free dairy products.  · Ask your doctor about the DASH eating plan. This plan helps reduce blood pressure.  · When you go to a restaurant, ask that your meal be prepared with no added salt.  Maintain a healthy weight  · Ask your healthcare provider how many calories to eat a day. Then stick to that number.  · Ask your healthcare provider what weight range is healthiest for you. If you are overweight, a weight loss of only 3% to 5% of your body weight can help lower blood pressure. Generally, a good weight loss goal is to lose 10% of your body weight in a year.  · Limit snacks and sweets.  · Get regular exercise.  Get up and get active  · Choose activities you enjoy. Find ones you can do with friends or family. This includes bicycling, dancing, walking, and jogging.  · Park farther away from building entrances.  · Use stairs instead of the elevator.  · When you can, walk or bike instead of driving.  · Leopold leaves, garden, or do household repairs.  · Be active at a moderate to vigorous level of physical activity for at least 40 minutes for a minimum of 3 to 4 days a week.   Manage stress  · Make time to relax and enjoy  life. Find time to laugh.  · Communicate your concerns with your loved ones and your healthcare provider.  · Visit with family and friends, and keep up with hobbies.  Limit alcohol and quit smoking  · Men should have no more than 2 drinks per day.  · Women should have no more than 1 drink per day.  · Talk with your healthcare provider about quitting smoking. Smoking significantly increases your risk for heart disease and stroke. Ask your healthcare provider about community smoking cessation programs and other options.  Medicines  If lifestyle changes arent enough, your healthcare provider may prescribe high blood pressure medicine. Take all medicines as prescribed. If you have any questions about your medicines, ask your healthcare provider before stopping or changing them.   Date Last Reviewed: 4/27/2016  © 3377-2380 [employer]. [address] [place] [address], [place] [address]. All rights reserved. This information is not intended as a substitute for professional medical care. Always follow your healthcare professional's instructions.

## 2019-06-07 DIAGNOSIS — Z12.11 COLON CANCER SCREENING: ICD-10-CM

## 2019-06-29 ENCOUNTER — HOSPITAL ENCOUNTER (INPATIENT)
Facility: HOSPITAL | Age: 74
LOS: 3 days | Discharge: HOME | DRG: 054 | End: 2019-07-02
Attending: EMERGENCY MEDICINE | Admitting: PSYCHIATRY & NEUROLOGY
Payer: MEDICARE

## 2019-06-29 DIAGNOSIS — R11.2 NAUSEA AND VOMITING, INTRACTABILITY OF VOMITING NOT SPECIFIED, UNSPECIFIED VOMITING TYPE: ICD-10-CM

## 2019-06-29 DIAGNOSIS — C79.31 BRAIN METASTASIS: Primary | ICD-10-CM

## 2019-06-29 DIAGNOSIS — R53.1 WEAKNESS: ICD-10-CM

## 2019-06-29 DIAGNOSIS — R51.9 NONINTRACTABLE HEADACHE, UNSPECIFIED CHRONICITY PATTERN, UNSPECIFIED HEADACHE TYPE: ICD-10-CM

## 2019-06-29 DIAGNOSIS — R42 DIZZINESS: ICD-10-CM

## 2019-06-29 DIAGNOSIS — C78.7 LIVER METASTASIS: ICD-10-CM

## 2019-06-29 DIAGNOSIS — R10.9 ABDOMINAL PAIN, UNSPECIFIED ABDOMINAL LOCATION: ICD-10-CM

## 2019-06-29 DIAGNOSIS — R19.7 DIARRHEA, UNSPECIFIED TYPE: ICD-10-CM

## 2019-06-29 DIAGNOSIS — R91.8 MASS OF UPPER LOBE OF RIGHT LUNG: ICD-10-CM

## 2019-06-29 PROBLEM — I61.6 NONTRAUMATIC MULTIPLE LOCALIZED INTRACEREBRAL HEMORRHAGES: Status: ACTIVE | Noted: 2019-06-29

## 2019-06-29 PROBLEM — G93.6 VASOGENIC BRAIN EDEMA: Status: ACTIVE | Noted: 2019-06-29

## 2019-06-29 PROBLEM — F10.10 ALCOHOL ABUSE, DAILY USE: Status: ACTIVE | Noted: 2019-06-29

## 2019-06-29 PROBLEM — G93.9 BRAIN LESION: Status: ACTIVE | Noted: 2019-06-29

## 2019-06-29 PROBLEM — Z86.718 HISTORY OF DVT (DEEP VEIN THROMBOSIS): Status: ACTIVE | Noted: 2019-06-29

## 2019-06-29 LAB
ABO + RH BLD: NORMAL
ALBUMIN SERPL BCP-MCNC: 4.1 G/DL (ref 3.5–5.2)
ALBUMIN SERPL BCP-MCNC: 4.4 G/DL (ref 3.5–5.2)
ALP SERPL-CCNC: 100 U/L (ref 55–135)
ALP SERPL-CCNC: 102 U/L (ref 55–135)
ALT SERPL W/O P-5'-P-CCNC: 12 U/L (ref 10–44)
ALT SERPL W/O P-5'-P-CCNC: 14 U/L (ref 10–44)
AMMONIA PLAS-SCNC: 40 UMOL/L (ref 10–50)
ANION GAP SERPL CALC-SCNC: 11 MMOL/L (ref 8–16)
APTT BLDCRRT: 24.6 SEC (ref 21–32)
AST SERPL-CCNC: 17 U/L (ref 10–40)
AST SERPL-CCNC: 18 U/L (ref 10–40)
BACTERIA #/AREA URNS AUTO: NORMAL /HPF
BASOPHILS # BLD AUTO: 0.01 K/UL (ref 0–0.2)
BASOPHILS NFR BLD: 0.1 % (ref 0–1.9)
BILIRUB DIRECT SERPL-MCNC: 0.5 MG/DL (ref 0.1–0.3)
BILIRUB SERPL-MCNC: 1.1 MG/DL (ref 0.1–1)
BILIRUB SERPL-MCNC: 1.2 MG/DL (ref 0.1–1)
BILIRUB UR QL STRIP: NEGATIVE
BLD GP AB SCN CELLS X3 SERPL QL: NORMAL
BNP SERPL-MCNC: 81 PG/ML (ref 0–99)
BUN SERPL-MCNC: 10 MG/DL (ref 8–23)
CALCIUM SERPL-MCNC: 10.5 MG/DL (ref 8.7–10.5)
CHLORIDE SERPL-SCNC: 103 MMOL/L (ref 95–110)
CHOLEST SERPL-MCNC: 162 MG/DL (ref 120–199)
CHOLEST/HDLC SERPL: 3 {RATIO} (ref 2–5)
CLARITY UR REFRACT.AUTO: ABNORMAL
CO2 SERPL-SCNC: 24 MMOL/L (ref 23–29)
COLOR UR AUTO: YELLOW
CREAT SERPL-MCNC: 1 MG/DL (ref 0.5–1.4)
DIFFERENTIAL METHOD: ABNORMAL
EOSINOPHIL # BLD AUTO: 0 K/UL (ref 0–0.5)
EOSINOPHIL NFR BLD: 0.1 % (ref 0–8)
ERYTHROCYTE [DISTWIDTH] IN BLOOD BY AUTOMATED COUNT: 15.1 % (ref 11.5–14.5)
EST. GFR  (AFRICAN AMERICAN): >60 ML/MIN/1.73 M^2
EST. GFR  (NON AFRICAN AMERICAN): >60 ML/MIN/1.73 M^2
ESTIMATED AVG GLUCOSE: 123 MG/DL (ref 68–131)
ETHANOL SERPL-MCNC: <10 MG/DL
GLUCOSE SERPL-MCNC: 139 MG/DL (ref 70–110)
GLUCOSE UR QL STRIP: NEGATIVE
HBA1C MFR BLD HPLC: 5.9 % (ref 4–5.6)
HCT VFR BLD AUTO: 44.3 % (ref 40–54)
HDLC SERPL-MCNC: 54 MG/DL (ref 40–75)
HDLC SERPL: 33.3 % (ref 20–50)
HGB BLD-MCNC: 14.8 G/DL (ref 14–18)
HGB UR QL STRIP: ABNORMAL
INR PPP: 1.1 (ref 0.8–1.2)
KETONES UR QL STRIP: ABNORMAL
LDLC SERPL CALC-MCNC: 96.6 MG/DL (ref 63–159)
LEUKOCYTE ESTERASE UR QL STRIP: NEGATIVE
LIPASE SERPL-CCNC: 52 U/L (ref 4–60)
LYMPHOCYTES # BLD AUTO: 0.8 K/UL (ref 1–4.8)
LYMPHOCYTES NFR BLD: 7 % (ref 18–48)
MAGNESIUM SERPL-MCNC: 2 MG/DL (ref 1.6–2.6)
MCH RBC QN AUTO: 29.1 PG (ref 27–31)
MCHC RBC AUTO-ENTMCNC: 33.4 G/DL (ref 32–36)
MCV RBC AUTO: 87 FL (ref 82–98)
MICROSCOPIC COMMENT: NORMAL
MONOCYTES # BLD AUTO: 0.8 K/UL (ref 0.3–1)
MONOCYTES NFR BLD: 7.1 % (ref 4–15)
NEUTROPHILS # BLD AUTO: 9.1 K/UL (ref 1.8–7.7)
NEUTROPHILS NFR BLD: 86 % (ref 38–73)
NITRITE UR QL STRIP: NEGATIVE
NONHDLC SERPL-MCNC: 108 MG/DL
PH UR STRIP: 6 [PH] (ref 5–8)
PLATELET # BLD AUTO: 467 K/UL (ref 150–350)
PMV BLD AUTO: 9.1 FL (ref 9.2–12.9)
POTASSIUM SERPL-SCNC: 4.4 MMOL/L (ref 3.5–5.1)
PROT SERPL-MCNC: 8.1 G/DL (ref 6–8.4)
PROT SERPL-MCNC: 8.4 G/DL (ref 6–8.4)
PROT UR QL STRIP: NEGATIVE
PROTHROMBIN TIME: 11.1 SEC (ref 9–12.5)
RBC # BLD AUTO: 5.08 M/UL (ref 4.6–6.2)
RBC #/AREA URNS AUTO: 1 /HPF (ref 0–4)
SODIUM SERPL-SCNC: 138 MMOL/L (ref 136–145)
SP GR UR STRIP: 1.01 (ref 1–1.03)
SQUAMOUS #/AREA URNS AUTO: 1 /HPF
TRIGL SERPL-MCNC: 57 MG/DL (ref 30–150)
TROPONIN I SERPL DL<=0.01 NG/ML-MCNC: <0.006 NG/ML (ref 0–0.03)
TSH SERPL DL<=0.005 MIU/L-ACNC: 0.41 UIU/ML (ref 0.4–4)
URN SPEC COLLECT METH UR: ABNORMAL
WBC # BLD AUTO: 10.65 K/UL (ref 3.9–12.7)
WBC #/AREA URNS AUTO: 2 /HPF (ref 0–5)

## 2019-06-29 PROCEDURE — 85025 COMPLETE CBC W/AUTO DIFF WBC: CPT

## 2019-06-29 PROCEDURE — 93010 ELECTROCARDIOGRAM REPORT: CPT | Mod: ,,, | Performed by: INTERNAL MEDICINE

## 2019-06-29 PROCEDURE — 99291 CRITICAL CARE FIRST HOUR: CPT | Mod: ,,, | Performed by: NURSE PRACTITIONER

## 2019-06-29 PROCEDURE — 99222 1ST HOSP IP/OBS MODERATE 55: CPT | Mod: GC,,, | Performed by: NEUROLOGICAL SURGERY

## 2019-06-29 PROCEDURE — 85730 THROMBOPLASTIN TIME PARTIAL: CPT

## 2019-06-29 PROCEDURE — 81001 URINALYSIS AUTO W/SCOPE: CPT

## 2019-06-29 PROCEDURE — 96361 HYDRATE IV INFUSION ADD-ON: CPT

## 2019-06-29 PROCEDURE — 80053 COMPREHEN METABOLIC PANEL: CPT

## 2019-06-29 PROCEDURE — 83880 ASSAY OF NATRIURETIC PEPTIDE: CPT

## 2019-06-29 PROCEDURE — 99222 PR INITIAL HOSPITAL CARE,LEVL II: ICD-10-PCS | Mod: GC,,, | Performed by: NEUROLOGICAL SURGERY

## 2019-06-29 PROCEDURE — 25000003 PHARM REV CODE 250: Performed by: NURSE PRACTITIONER

## 2019-06-29 PROCEDURE — 25500020 PHARM REV CODE 255: Performed by: PSYCHIATRY & NEUROLOGY

## 2019-06-29 PROCEDURE — 83690 ASSAY OF LIPASE: CPT

## 2019-06-29 PROCEDURE — 63600175 PHARM REV CODE 636 W HCPCS: Performed by: STUDENT IN AN ORGANIZED HEALTH CARE EDUCATION/TRAINING PROGRAM

## 2019-06-29 PROCEDURE — 63600175 PHARM REV CODE 636 W HCPCS: Performed by: NURSE PRACTITIONER

## 2019-06-29 PROCEDURE — 84443 ASSAY THYROID STIM HORMONE: CPT

## 2019-06-29 PROCEDURE — 85610 PROTHROMBIN TIME: CPT

## 2019-06-29 PROCEDURE — 63600175 PHARM REV CODE 636 W HCPCS: Performed by: EMERGENCY MEDICINE

## 2019-06-29 PROCEDURE — 83036 HEMOGLOBIN GLYCOSYLATED A1C: CPT

## 2019-06-29 PROCEDURE — 80320 DRUG SCREEN QUANTALCOHOLS: CPT

## 2019-06-29 PROCEDURE — 99291 PR CRITICAL CARE, E/M 30-74 MINUTES: ICD-10-PCS | Mod: ,,, | Performed by: NURSE PRACTITIONER

## 2019-06-29 PROCEDURE — 99285 EMERGENCY DEPT VISIT HI MDM: CPT | Mod: 25

## 2019-06-29 PROCEDURE — 86901 BLOOD TYPING SEROLOGIC RH(D): CPT

## 2019-06-29 PROCEDURE — 93005 ELECTROCARDIOGRAM TRACING: CPT

## 2019-06-29 PROCEDURE — 20000000 HC ICU ROOM

## 2019-06-29 PROCEDURE — 82962 GLUCOSE BLOOD TEST: CPT

## 2019-06-29 PROCEDURE — 83735 ASSAY OF MAGNESIUM: CPT

## 2019-06-29 PROCEDURE — 84484 ASSAY OF TROPONIN QUANT: CPT

## 2019-06-29 PROCEDURE — A9585 GADOBUTROL INJECTION: HCPCS | Performed by: EMERGENCY MEDICINE

## 2019-06-29 PROCEDURE — 96374 THER/PROPH/DIAG INJ IV PUSH: CPT

## 2019-06-29 PROCEDURE — 25500020 PHARM REV CODE 255: Performed by: EMERGENCY MEDICINE

## 2019-06-29 PROCEDURE — 25000003 PHARM REV CODE 250: Performed by: EMERGENCY MEDICINE

## 2019-06-29 PROCEDURE — 82140 ASSAY OF AMMONIA: CPT

## 2019-06-29 PROCEDURE — 80076 HEPATIC FUNCTION PANEL: CPT

## 2019-06-29 PROCEDURE — 93010 EKG 12-LEAD: ICD-10-PCS | Mod: ,,, | Performed by: INTERNAL MEDICINE

## 2019-06-29 PROCEDURE — 80061 LIPID PANEL: CPT

## 2019-06-29 RX ORDER — DEXAMETHASONE SODIUM PHOSPHATE 4 MG/ML
10 INJECTION, SOLUTION INTRA-ARTICULAR; INTRALESIONAL; INTRAMUSCULAR; INTRAVENOUS; SOFT TISSUE
Status: COMPLETED | OUTPATIENT
Start: 2019-06-29 | End: 2019-06-29

## 2019-06-29 RX ORDER — HYDROMORPHONE HYDROCHLORIDE 2 MG/ML
0.5 INJECTION, SOLUTION INTRAMUSCULAR; INTRAVENOUS; SUBCUTANEOUS
Status: COMPLETED | OUTPATIENT
Start: 2019-06-29 | End: 2019-06-29

## 2019-06-29 RX ORDER — SODIUM CHLORIDE, SODIUM LACTATE, POTASSIUM CHLORIDE, CALCIUM CHLORIDE 600; 310; 30; 20 MG/100ML; MG/100ML; MG/100ML; MG/100ML
INJECTION, SOLUTION INTRAVENOUS CONTINUOUS
Status: DISCONTINUED | OUTPATIENT
Start: 2019-06-29 | End: 2019-06-30

## 2019-06-29 RX ORDER — ACETAMINOPHEN 325 MG/1
650 TABLET ORAL EVERY 6 HOURS PRN
Status: DISCONTINUED | OUTPATIENT
Start: 2019-06-29 | End: 2019-07-02 | Stop reason: HOSPADM

## 2019-06-29 RX ORDER — ONDANSETRON 2 MG/ML
8 INJECTION INTRAMUSCULAR; INTRAVENOUS
Status: DISCONTINUED | OUTPATIENT
Start: 2019-06-29 | End: 2019-06-29

## 2019-06-29 RX ORDER — ONDANSETRON 2 MG/ML
4 INJECTION INTRAMUSCULAR; INTRAVENOUS EVERY 8 HOURS PRN
Status: DISCONTINUED | OUTPATIENT
Start: 2019-06-29 | End: 2019-07-02 | Stop reason: HOSPADM

## 2019-06-29 RX ORDER — THIAMINE HCL 100 MG
100 TABLET ORAL DAILY
Status: DISCONTINUED | OUTPATIENT
Start: 2019-06-29 | End: 2019-07-02 | Stop reason: HOSPADM

## 2019-06-29 RX ORDER — GADOBUTROL 604.72 MG/ML
9 INJECTION INTRAVENOUS
Status: COMPLETED | OUTPATIENT
Start: 2019-06-29 | End: 2019-06-29

## 2019-06-29 RX ORDER — DEXAMETHASONE SODIUM PHOSPHATE 4 MG/ML
4 INJECTION, SOLUTION INTRA-ARTICULAR; INTRALESIONAL; INTRAMUSCULAR; INTRAVENOUS; SOFT TISSUE EVERY 6 HOURS
Status: DISCONTINUED | OUTPATIENT
Start: 2019-06-29 | End: 2019-07-02 | Stop reason: HOSPADM

## 2019-06-29 RX ORDER — HYDRALAZINE HYDROCHLORIDE 20 MG/ML
10 INJECTION INTRAMUSCULAR; INTRAVENOUS EVERY 6 HOURS PRN
Status: DISCONTINUED | OUTPATIENT
Start: 2019-06-29 | End: 2019-07-02 | Stop reason: HOSPADM

## 2019-06-29 RX ORDER — AMLODIPINE BESYLATE 10 MG/1
10 TABLET ORAL DAILY
Status: DISCONTINUED | OUTPATIENT
Start: 2019-06-29 | End: 2019-07-02 | Stop reason: HOSPADM

## 2019-06-29 RX ORDER — SODIUM CHLORIDE 0.9 % (FLUSH) 0.9 %
10 SYRINGE (ML) INJECTION
Status: DISCONTINUED | OUTPATIENT
Start: 2019-06-29 | End: 2019-07-02 | Stop reason: HOSPADM

## 2019-06-29 RX ORDER — AMOXICILLIN 250 MG
1 CAPSULE ORAL 2 TIMES DAILY
Status: DISCONTINUED | OUTPATIENT
Start: 2019-06-29 | End: 2019-07-02 | Stop reason: HOSPADM

## 2019-06-29 RX ORDER — FOLIC ACID 1 MG/1
1 TABLET ORAL DAILY
Status: DISCONTINUED | OUTPATIENT
Start: 2019-06-30 | End: 2019-07-02 | Stop reason: HOSPADM

## 2019-06-29 RX ORDER — HYDROMORPHONE HYDROCHLORIDE 1 MG/ML
0.5 INJECTION, SOLUTION INTRAMUSCULAR; INTRAVENOUS; SUBCUTANEOUS
Status: COMPLETED | OUTPATIENT
Start: 2019-06-29 | End: 2019-06-29

## 2019-06-29 RX ORDER — OXYCODONE HYDROCHLORIDE 5 MG/1
5 TABLET ORAL EVERY 6 HOURS PRN
Status: DISCONTINUED | OUTPATIENT
Start: 2019-06-29 | End: 2019-07-02 | Stop reason: HOSPADM

## 2019-06-29 RX ADMIN — SENNOSIDES,DOCUSATE SODIUM 1 TABLET: 8.6; 5 TABLET, FILM COATED ORAL at 09:06

## 2019-06-29 RX ADMIN — OXYCODONE HYDROCHLORIDE 5 MG: 5 TABLET ORAL at 06:06

## 2019-06-29 RX ADMIN — SODIUM CHLORIDE 1000 ML: 0.9 INJECTION, SOLUTION INTRAVENOUS at 10:06

## 2019-06-29 RX ADMIN — SODIUM CHLORIDE, SODIUM LACTATE, POTASSIUM CHLORIDE, AND CALCIUM CHLORIDE: .6; .31; .03; .02 INJECTION, SOLUTION INTRAVENOUS at 06:06

## 2019-06-29 RX ADMIN — ACETAMINOPHEN 650 MG: 325 TABLET ORAL at 09:06

## 2019-06-29 RX ADMIN — IOHEXOL 100 ML: 350 INJECTION, SOLUTION INTRAVENOUS at 10:06

## 2019-06-29 RX ADMIN — DEXAMETHASONE SODIUM PHOSPHATE 10 MG: 4 INJECTION, SOLUTION INTRAMUSCULAR; INTRAVENOUS at 05:06

## 2019-06-29 RX ADMIN — GADOBUTROL 9 ML: 604.72 INJECTION INTRAVENOUS at 04:06

## 2019-06-29 RX ADMIN — Medication 100 MG: at 06:06

## 2019-06-29 RX ADMIN — AMLODIPINE BESYLATE 10 MG: 10 TABLET ORAL at 06:06

## 2019-06-29 RX ADMIN — ONDANSETRON 4 MG: 2 INJECTION INTRAMUSCULAR; INTRAVENOUS at 10:06

## 2019-06-29 RX ADMIN — DEXAMETHASONE SODIUM PHOSPHATE 4 MG: 4 INJECTION, SOLUTION INTRAMUSCULAR; INTRAVENOUS at 11:06

## 2019-06-29 RX ADMIN — FOLIC ACID: 5 INJECTION, SOLUTION INTRAMUSCULAR; INTRAVENOUS; SUBCUTANEOUS at 07:06

## 2019-06-29 RX ADMIN — HYDROMORPHONE HYDROCHLORIDE 0.5 MG: 2 INJECTION, SOLUTION INTRAMUSCULAR; INTRAVENOUS; SUBCUTANEOUS at 12:06

## 2019-06-29 RX ADMIN — HYDROMORPHONE HYDROCHLORIDE 0.5 MG: 1 INJECTION, SOLUTION INTRAMUSCULAR; INTRAVENOUS; SUBCUTANEOUS at 02:06

## 2019-06-29 NOTE — CONSULTS
"[facility]  Neurosurgery  Consult Note    Consults  Subjective:     Chief Complaint/Reason for Admission: New brain lesions    History of Present Illness: Jeffery Reyna is a 74 yo male with a PMH of HTN, DM who presents to [facility] ER after transfer from [facility] for 3 days of headaches that prevented him from sleeping, as well as nausea with one episode of vomiting, dizziness, and mild diarrhea with "chills" in the same time period.  He states that he had new onset of generalized headaches retroorbital, frontal and occipital to about 7-8/10 pain which are constant which began about 3 days ago. He has had no recent falls or head trauma.  He had an episode of vomiting after eating 2 days ago, and since has not had solid food and has had persistent nausea.  He also endorses dizziness which is minor and still allows him to walk during the same time period as well as Right hand numbness on review of systems. He notes no weakness and denies vision change.  He has a history of 1 pack per year smoking for 55 years, daily 6 beer per day etoh use as well as a family history of colon cancer in his brother and mother with no colonoscopy and prostate cancer to his cousin.  He has not had recent travel or other illness. He takes ASA 81 for prevention. History of a DVT in the leg many years ago to which he hs no anticoagulation or other antiplatelet medicine. Presents for work-up after several hemorrhagic brain lesions found at OSH.       (Not in a hospital admission)    Review of patient's allergies indicates:  No Known Allergies    Past Medical History:   Diagnosis Date    DVT (deep venous thrombosis)     Hypertension      History reviewed. No pertinent surgical history.  Family History     Problem Relation (Age of Onset)    Cancer Mother    Diabetes Mother    Hypertension Sister    Thyroid disease Mother        Tobacco Use    Smoking status: Current Every Day Smoker     Packs/day: 1.00     Types: " "Cigarettes    Smokeless tobacco: Never Used   Substance and Sexual Activity    Alcohol use: Yes     Comment: daily "6 beers"    Drug use: No    Sexual activity: Not on file     Review of Systems  Objective:     Weight: 81.6 kg (180 lb)  Body mass index is 25.83 kg/m².  Vital Signs (Most Recent):  Temp: 98.4 °F (36.9 °C) (06/29/19 1348)  Pulse: (!) 59 (06/29/19 1800)  Resp: 17 (06/29/19 1800)  BP: (!) 151/70 (06/29/19 1800)  SpO2: 97 % (06/29/19 1800) Vital Signs (24h Range):  Temp:  [97.7 °F (36.5 °C)-98.5 °F (36.9 °C)] 98.4 °F (36.9 °C)  Pulse:  [58-96] 59  Resp:  [15-22] 17  SpO2:  [94 %-100 %] 97 %  BP: (115-173)/(70-96) 151/70     Date 06/29/19 0700 - 06/30/19 0659   Shift 9463-4916 7468-3091 2401-0534 24 Hour Total   INTAKE   IV Piggyback 1000   1000   Shift Total(mL/kg) 1000(12.2)   1000(12.2)   OUTPUT   Shift Total(mL/kg)       Weight (kg) 81.6 81.6 81.6 81.6                        Neurosurgery Physical Exam  Family at bedside, NAD.     E4V5M6; A+Ox3  Speech is slow, fluent. Clear and coherent  PERRL, EOMI, no facial droop, tongue midline  Left visual field cut  Numbness to right fingers on palmar and dorsal aspect of hand only  No weakness, no pronator drift, no dysmetria  Symmetric expansion, normal respirations  No abdominal tenderness      Significant Labs:  Recent Labs   Lab 06/29/19  1135   *      K 4.4      CO2 24   BUN 10   CREATININE 1.0   CALCIUM 10.5   MG 2.0     Recent Labs   Lab 06/29/19  1135   WBC 10.65   HGB 14.8   HCT 44.3   *     Recent Labs   Lab 06/29/19  1135 06/29/19  1445   INR 1.1  --    APTT  --  24.6     Microbiology Results (last 7 days)     ** No results found for the last 168 hours. **        Recent Lab Results       06/29/19  1445   06/29/19  1135        Albumin   4.4     Alcohol, Medical, Serum   <10     Alkaline Phosphatase   100     ALT   12     Ammonia   40     Anion Gap   11     aPTT 24.6  Comment:  aPTT therapeutic range = 39-69 seconds       " AST   18     Baso #   0.01     Basophil%   0.1     BILIRUBIN TOTAL   1.1  Comment:  For infants and newborns, interpretation of results should be based  on gestational age, weight and in agreement with clinical  observations.  Premature Infant recommended reference ranges:  Up to 24 hours.............<8.0 mg/dL  Up to 48 hours............<12.0 mg/dL  3-5 days..................<15.0 mg/dL  6-29 days.................<15.0 mg/dL       BNP   81  Comment:  Values of less than 100 pg/ml are consistent with non-CHF populations.     BUN, Bld   10     Calcium   10.5     Chloride   103     CO2   24     Creatinine   1.0     Differential Method   Automated     eGFR if    >60     eGFR if non    >60  Comment:  Calculation used to obtain the estimated glomerular filtration  rate (eGFR) is the CKD-EPI equation.        Eos #   0.0     Eosinophil%   0.1     Glucose   139     Gran # (ANC)   9.1     Gran%   86.0     Group & Rh B POS       Hematocrit   44.3     Hemoglobin   14.8     INDIRECT MARIE NEG       Coumadin Monitoring INR   1.1  Comment:  Coumadin Therapy:  2.0 - 3.0 for INR for all indicators except mechanical heart valves  and antiphospholipid syndromes which should use 2.5 - 3.5.       Lipase   52     Lymph #   0.8     Lymph%   7.0     Magnesium   2.0     MCH   29.1     MCHC   33.4     MCV   87     Mono #   0.8     Mono%   7.1     MPV   9.1     Platelets   467     Potassium   4.4     PROTEIN TOTAL   8.4     Protime   11.1     RBC   5.08     RDW   15.1     Sodium   138     Troponin I   <0.006  Comment:  The reference interval for Troponin I represents the 99th percentile   cutoff   for our facility and is consistent with 3rd generation assay   performance.       WBC   10.65           Significant Diagnostics:  CT: Ct Head Without Contrast    Result Date: 6/29/2019  Findings most suggestive of multiple hemorrhagic meds, largest within the right frontal and parietal location with surrounding  vasogenic edema. COMMUNICATION This critical result was discovered/received at 11:05 hours.  The critical information above was relayed directly by me by telephone to Dr. Montiel in the emergency room on 06/29/2019 at 11:13 hours. Electronically signed by: Leland Leavitt MD Date:    06/29/2019 Time:    11:13  Echoencephalography: No results found in the last 24 hours.  MRI: Mri Brain W Wo Contrast    Result Date: 6/29/2019  Several nonenhancing hemorrhagic lesions.  One within the right occipital lobe appears to have more vasogenic edema possibly due to recent hemorrhage and there is extension of the signal abnormality within the adjacent posterior horn of the right lateral ventricle concerning for extension of the hemorrhage within the right lateral ventricle. The etiology of these hemorrhagic lesions is unclear.  The lack of enhancement mitigates against metastases although it is not excluded. These do not represent characteristic finding of abscesses. Numerous large cavernomas could have this appearance with more recent bleed of the right occipital lesion. Short-term follow-up advised, this has to be correlated with patient's clinical status and prior medical history. Electronically signed by: Cindy Jon MD Date:    06/29/2019 Time:    16:53    Assessment/Plan:     Brain lesion  72 yo male with a PMH of HTN, DM, daily ASA, 55 pack year smoking, daily etoh use who presents with severe headache, nausea/vomiting found to have brain lesions with hemorrhagic component suspicious for metastasis.  Awake, alert and oriented x 3 with L visual field cut, R hand numbness.     --Admit to neuro ICU for q1 hour neurochecks  --MRI Brain stealth obtained with at least 4 distinct minimally enhancing lesions R frontal 2.5x3cm lesion, 2.3x1.4 R occipital lesion most prominent. No hydrocephalus.   --CT C/A/P with contrast for search of primary lesion  --Consult hem/onc for staging  --SBP < 160 in setting of acute  hemorrhage (labetalol / hydralazine prn or cardene gtt)  --Dex 10, 4q6 for edema  --Please call nsgy with any change in exam  --Daily etoh, recommend CIWA precautions  --Full pre-op labs  --Will follow closely            Thank you for your consult. I will follow-up with patient. Please contact us if you have any additional questions.    Jose Luis Perez MD  Neurosurgery  [facility]

## 2019-06-29 NOTE — ED PROVIDER NOTES
"Encounter Date: 6/29/2019       History     Chief Complaint   Patient presents with    Abdominal Pain     Abdominal pain with nausea and vomiting.  Reports diarrhea last night.  Sent from Urgent Care for further eval.    Brain mass     Pt is a transfer from [facility] ED for evaluation of a brain mass with hemorrhage.      73 year old male with pmhx of HTN transferred here from [facility] for neurosurgery evaluation. He has had a diffuse headache for the past 3 days with associated blurry vision, nausea, multiple episodes of vomiting (twice today), intermittent lightheadedness and dizziness. CT at [facility] shows multiple hemorrhagic mets to R frontal and parietal regions with vasogenic edema. Pt also reports decreased appetite and chronic constipation. He denies any focal weakness, recent fall or trauma, fever, chills, abdominal pain, CP, SOB, LOC. He was given dilaudid and 1L IVF at [facility].        Review of patient's allergies indicates:  No Known Allergies  Past Medical History:   Diagnosis Date    DVT (deep venous thrombosis)     Hypertension      History reviewed. No pertinent surgical history.  Family History   Problem Relation Age of Onset    Diabetes Mother     Thyroid disease Mother     Cancer Mother     Hypertension Sister      Social History     Tobacco Use    Smoking status: Current Every Day Smoker     Packs/day: 1.00     Types: Cigarettes    Smokeless tobacco: Never Used   Substance Use Topics    Alcohol use: Yes     Comment: daily "6 beers"    Drug use: No     Review of Systems   Constitutional: Positive for activity change, appetite change and fatigue. Negative for chills and fever.   HENT: Negative for sore throat.    Eyes: Positive for visual disturbance.   Respiratory: Negative for cough and shortness of breath.    Cardiovascular: Negative for chest pain.   Gastrointestinal: Positive for constipation (chronic), diarrhea, nausea and vomiting. Negative for abdominal pain.   Endocrine: Negative " for polyuria.   Genitourinary: Negative for dysuria.   Musculoskeletal: Negative for back pain, neck pain and neck stiffness.   Skin: Negative for rash and wound.   Neurological: Positive for dizziness, light-headedness and headaches. Negative for seizures, syncope and facial asymmetry.       Physical Exam     Initial Vitals [06/29/19 0942]   BP Pulse Resp Temp SpO2   (!) 146/85 71 16 97.7 °F (36.5 °C) 100 %      MAP       --         Physical Exam    Nursing note and vitals reviewed.  Constitutional: He appears well-developed and well-nourished. He is not diaphoretic.  Non-toxic appearance. He does not appear ill. No distress.   HENT:   Head: Normocephalic and atraumatic.   Mouth/Throat: Oropharynx is clear and moist.   Eyes: No scleral icterus.   Neck: Neck supple.   Cardiovascular: Normal rate and regular rhythm.   Pulmonary/Chest: Breath sounds normal. No respiratory distress. He has no wheezes.   Abdominal: Soft. He exhibits no distension. There is no tenderness.   Musculoskeletal: He exhibits no edema or tenderness.   Neurological: He is alert and oriented to person, place, and time. He has normal strength. No cranial nerve deficit or sensory deficit. GCS eye subscore is 4. GCS verbal subscore is 5. GCS motor subscore is 6.   Skin: Skin is warm and dry. No erythema. No pallor.   Psychiatric: He has a normal mood and affect. Thought content normal.         ED Course   Procedures  Labs Reviewed   CBC W/ AUTO DIFFERENTIAL - Abnormal; Notable for the following components:       Result Value    RDW 15.1 (*)     Platelets 467 (*)     MPV 9.1 (*)     Gran # (ANC) 9.1 (*)     Lymph # 0.8 (*)     Gran% 86.0 (*)     Lymph% 7.0 (*)     All other components within normal limits   COMPREHENSIVE METABOLIC PANEL - Abnormal; Notable for the following components:    Glucose 139 (*)     Total Bilirubin 1.1 (*)     All other components within normal limits   HEMOGLOBIN A1C - Abnormal; Notable for the following components:     Hemoglobin A1C 5.9 (*)     All other components within normal limits   URINALYSIS, REFLEX TO URINE CULTURE - Abnormal; Notable for the following components:    Appearance, UA Hazy (*)     Ketones, UA Trace (*)     Occult Blood UA 1+ (*)     All other components within normal limits    Narrative:     Preferred Collection Type->Urine, Clean Catch   HEPATIC FUNCTION PANEL - Abnormal; Notable for the following components:    Total Bilirubin 1.2 (*)     Bilirubin, Direct 0.5 (*)     All other components within normal limits   ALCOHOL,MEDICAL (ETHANOL)   MAGNESIUM   LIPASE   AMMONIA   TROPONIN I   B-TYPE NATRIURETIC PEPTIDE   PROTIME-INR   APTT   LIPID PANEL   TSH   URINALYSIS MICROSCOPIC    Narrative:     Preferred Collection Type->Urine, Clean Catch   TYPE & SCREEN        ECG Results          EKG 12-lead (In process)  Result time 06/29/19 17:02:27    In process by Interface, Lab In [facility] (06/29/19 17:02:27)                 Narrative:    Test Reason : R53.1,    Vent. Rate : 068 BPM     Atrial Rate : 068 BPM     P-R Int : 158 ms          QRS Dur : 076 ms      QT Int : 420 ms       P-R-T Axes : 063 051 001 degrees     QTc Int : 446 ms    Normal sinus rhythm  Nonspecific ST and T wave abnormality  Abnormal ECG  No previous ECGs available    Referred By: [email or web address]   SELF           Confirmed By:                   In process by Interface, Lab In [facility] (06/29/19 16:47:47)                 Narrative:    Test Reason : R53.1,    Vent. Rate : 068 BPM     Atrial Rate : 068 BPM     P-R Int : 158 ms          QRS Dur : 076 ms      QT Int : 420 ms       P-R-T Axes : 063 051 001 degrees     QTc Int : 446 ms    Normal sinus rhythm  Nonspecific ST and T wave abnormality  Abnormal ECG  No previous ECGs available    Referred By: [email or web address]   SELF           Confirmed By:                             Imaging Results          X-Ray Chest AP Single View (Final result)  Result time 06/29/19 17:53:05    Final result by Slick Rodriguez MD  (06/29/19 17:53:05)                 Impression:      Patchy consolidative opacity projected over the right midlung zone, likely within the right upper lobe inferiorly, findings may reflect sequela of infection, correlation recommended.  Follow-up is advised to exclude malignancy.  Comparison with any more recent exams would be helpful.      Electronically signed by: Slick Rodriguez MD  Date:    06/29/2019  Time:    17:53             Narrative:    EXAMINATION:  XR CHEST 1 VIEW    CLINICAL HISTORY:  baseline, h/o smoking;    TECHNIQUE:  Single frontal view of the chest was performed.    COMPARISON:  CT 12/10/2014, radiograph 11/18/2014    FINDINGS:  The cardiomediastinal silhouette is not enlarged.  There is no pleural effusion.  The trachea is midline.  The lungs are symmetrically expanded bilaterally with patchy increased interstitial and parenchymal attenuation projected over the right midlung zone, likely within the right upper lobe.  There is minimal bilateral basilar subsegmental atelectasis..  There is no pneumothorax.  The osseous structures are remarkable for degenerative changes peer.                               MRI Brain W WO Contrast (Final result)  Result time 06/29/19 16:53:07    Final result by Cindy Jon MD (06/29/19 16:53:07)                 Impression:      Several nonenhancing hemorrhagic lesions.  One within the right occipital lobe appears to have more vasogenic edema possibly due to recent hemorrhage and there is extension of the signal abnormality within the adjacent posterior horn of the right lateral ventricle concerning for extension of the hemorrhage within the right lateral ventricle.    The etiology of these hemorrhagic lesions is unclear.  The lack of enhancement mitigates against metastases although it is not excluded.    These do not represent characteristic finding of abscesses.    Numerous large cavernomas could have this appearance with more recent bleed of the right  occipital lesion.    Short-term follow-up advised, this has to be correlated with patient's clinical status and prior medical history.      Electronically signed by: Cindy Jon MD  Date:    06/29/2019  Time:    16:53             Narrative:    EXAMINATION:  MRI BRAIN W WO CONTRAST    CLINICAL HISTORY:  Neoplasm - CNS primary;Obtain stealth can for possible pre operative planning;    TECHNIQUE:  Routine MRI brain with and without contrast,   multiplanar multisequence MR imaging of the brain was performed before and after the administration of 9 mL Gadavist intravenous contrast.    COMPARISON:  CT 06/29/2019    FINDINGS:  The brain is normally formed.  The ventricular system is normal in size, midline.  Several areas of abnormal signal noted.    One within the posterior right frontal lobe measures approximately 3.3 cm demonstrating slightly increased T1 signal, low T2 signal with no definite areas of postcontrast enhancement.  There is a small halo of vasogenic edema.  No post-contrast enhancement.    There is a small area of signal abnormality within the posterior left frontal lobe subcortical region measuring approximately 1.3 cm with also a ring of high T1 signal, central  isoT1 signal and I so T2 signal with a small halo of vasogenic edema.  No post-contrast enhancement.    A 3rd lesion is seen within the right occipital lobe measuring 2.9 cm demonstrating slightly increased T1 and T2 signal with a larger halo of adjacent vasogenic edema and small area of darker signal suggestive of hemosiderin deposition.  No post-contrast enhancement. Small area of signal abnormality posterior horn of the right lateral ventricle concerning for extension to the hemorrhage within the ventricles. No ventricular dilation.  The left lateral ventricle appears normal.  The 3rd and 4th ventricle appear normal.    A smaller nonspecific high T1 high T2 signal lesion within the right [place] measuring about 3 mm.    S smaller  high T1 high T2 signal lesion within the left thalamus with hemosiderin deposition.  Measures less than 1 cm.    There are also numerous punctate areas of abnormal T2 and FLAIR signal seen within the periventricular white matter of the bilateral cerebral hemisphere consistent with small vessel chronic ischemic changes appropriate for this patient's age.  The skull base flow voids are accounted for.  The paranasal sinuses and mastoid air cells are well aerated.  The sella and parasellar region appear normal.  The craniocervical junction appears normal.  The skull and visualized soft tissues appear normal.                               CT Head Without Contrast (Final result)  Result time 06/29/19 11:13:48    Final result by Leland Leavitt MD (06/29/19 11:13:48)                 Impression:      Findings most suggestive of multiple hemorrhagic meds, largest within the right frontal and parietal location with surrounding vasogenic edema.    COMMUNICATION  This critical result was discovered/received at 11:05 hours.  The critical information above was relayed directly by me by telephone to Dr. Montiel in the emergency room on 06/29/2019 at 11:13 hours.      Electronically signed by: Leland Leavitt MD  Date:    06/29/2019  Time:    11:13             Narrative:    EXAMINATION:  CT HEAD WITHOUT CONTRAST    CLINICAL HISTORY:  Headache, acute, severe, thunderclap, worst HA of life;    TECHNIQUE:  Low dose axial images were obtained through the head.  Coronal and sagittal reformations were also performed. Contrast was not administered.    COMPARISON:  None.    FINDINGS:  Multiple hyperdense/hemorrhagic masses present within the bilateral cerebral hemispheres largest within the right frontal (2.4 x 2.3 cm) and right parietal (3.3 x 1.7 cm) locations.  Larger lesions demonstrate surrounding vasogenic edema.  There is some mild right cerebral hemisphere sulcal effacement at these locations.  Smaller left frontal and left  basal ganglia foci noted.  There is a 4 mm inferior pontine lesion as well.    No significant extra-axial hemorrhage.  No hydrocephalus or midline shift.  Basilar cisterns patent.    Osseous structures intact.  Mastoid air cells and paranasal sinuses clear.                                 Medical Decision Making:   History:   Old Medical Records: I decided to obtain old medical records.  Old Records Summarized: records from another hospital.  Initial Assessment:   Emergent evaluation of this 73 year old male transferred here from [facility] for neurosurgery eval. C/o headache for the past 3 days with associated blurry vision, nausea, vomiting, lightheadedness, and dizziness. CT at [facility] shows multiple hemorrhagic mets to R frontal and parietal regions with vasogenic edema. Neurovascularly intact, hemodynamically stable. Consulted neurosurgery, who recommends to order MRI brain w/wo contrast. Pt given zofran for nausea and asking for dilaudid which helped his headache at [facility].              Attending Attestation:   Physician Attestation Statement for Resident:  As the supervising MD   Physician Attestation Statement: I have personally seen and examined this patient.   I agree with the above history. -: For admission to neuro ICU for hemorrhagic brain lesions.  NSG recommended decadron 10 mg IV and admission to neuro ICU   As the supervising MD I agree with the above PE.    As the supervising MD I agree with the above treatment, course, plan, and disposition.  I have reviewed and agree with the residents interpretation of the following: lab data, x-rays and CT scans.  I have reviewed the following: records from a referring facility.        Attending Critical Care:   Critical Care Times:   ==============================================================  · Total Critical Care Time - exclusive of procedural time: 35 minutes.  ==============================================================  Critical care was necessary to  treat or prevent imminent or life-threatening deterioration of the following conditions: CNS failure.   Critical care was time spent personally by me on the following activities: obtaining history from patient or relative, examination of patient, review of x-rays / CT sent with the patient, review of old charts, ordering lab, x-rays, and/or EKG, development of treatment plan with patient or relative, ordering and performing treatments and interventions, evaluation of patient's response to treatment, discussion with consultants and re-evaluation of patient's conition.   Critical Care Condition: potentially life-threatening                  Clinical Impression:       ICD-10-CM ICD-9-CM   1. Brain metastasis C79.31 198.3   2. Weakness R53.1 780.79   3. Abdominal pain, unspecified abdominal location R10.9 789.00   4. Dizziness R42 780.4   5. Nonintractable headache, unspecified chronicity pattern, unspecified headache type R51 784.0   6. Nausea and vomiting, intractability of vomiting not specified, unspecified vomiting type R11.2 787.01   7. Diarrhea, unspecified type R19.7 787.91                                Keena Nelson MD  06/30/19 0898

## 2019-06-29 NOTE — ED TRIAGE NOTES
Pt comes to ED today after visiting urgent care in [facility].  Pt's family reports that the patient had been having nausea and vomiting, diarrhea starting yesterday, elevated blood pressure, headache, dizziness, and blurry vision.  At this time, pt is alert and oriented x4, VSS and he appears to be in no acute distress at this time.

## 2019-06-29 NOTE — ED TRIAGE NOTES
Jeffery Reyna, a 73 y.o. male presents to the ED via [facility] with CC HA for a few days, also c/o dizziness and blurred vision. Pt also c/o tingling to right hand fingers for x1 week.  Patient also c/o abdominal for a few days. Pt reports N/V and diarrhea x1 week. Denies urinary complaints.    Patient identifiers verified verbally with patient and EMS and correct for Jeffery Reyna.    LOC/ APPEARANCE: The patient is awake alert and oriented to self place and situation, disoriented to time, states correct year, does not know current month, family states baseline for patient. Pt is speaking appropriately, no slurred speech. Pt changed into hospital gown. Continuous cardiac monitor, cont pulse ox, and auto BP cuff applied to patient. Pt is clean and well groomed. No JVD visible. Pt reports pain level of 8/10 HA. Pt updated on POC. Family at bedside. Bed low and locked with side rails up x2, call bell in pt reach.  SKIN: Skin is warm dry and intact, and color is consistent with ethnicity. Capillary refill <3 seconds. Mucus membranes moist, acyanotic. Soft mass noted to pt right lower arm, non tender, pt reports is chronic.  RESPIRATORY: Airway is open and patent. Respirations-spontaneous, unlabored, regular rate, equal bilaterally on inspiration and expiration. No accessory muscle use noted. Lungs clear to auscultation in all fields bilaterally anterior and posterior.   CARDIAC: Patient has regular heart rate. No peripheral edema noted, and patient has no c/o chest pain. Peripheral pulses present equal and strong throughout.  ABDOMEN: Soft and non-tender to palpation with no distention noted. Normoactive bowel sounds x4 quadrants. Pt reports diarrhea onset yesterday, denies blood in stool, denies recent antibiotic use.. Pt reports decreased appetite and nausea.   NEUROLOGIC: Eyes open spontaneously and facial expression symmetrical. Pt behavior appropriate to situation, and pt follows commands. Pt reports sensation  present in all extremities when touched with a finger, reports tingling to right hand x1 week. PERRLA  MUSCULOSKELETAL: Spontaneous movement noted to all extremities. Hand  equal and leg strength strong +5 bilaterally.   : No complaints of frequency, burning, urgency or blood in the urine. No complaints of incontinence.

## 2019-06-29 NOTE — SUBJECTIVE & OBJECTIVE
Review of Systems: Review of Systems   Constitutional: Negative for chills and fever.   HENT: Negative for congestion, nosebleeds and sore throat.    Eyes: Positive for blurred vision. Negative for double vision and photophobia.   Respiratory: Negative for cough, shortness of breath and wheezing.    Cardiovascular: Negative for chest pain, palpitations and leg swelling.   Gastrointestinal: Positive for nausea. Negative for vomiting.   Genitourinary: Positive for dysuria. Negative for frequency and urgency.   Musculoskeletal: Negative for back pain, joint pain and neck pain.   Skin: Negative for itching and rash.   Neurological: Positive for headaches. Negative for dizziness, sensory change, speech change, focal weakness, loss of consciousness and weakness.     Vitals:   Temp: 98.4 °F (36.9 °C)  Pulse: (!) 58  BP: (!) 156/84  MAP (mmHg): 114  Resp: 20  SpO2: 95 %  O2 Device (Oxygen Therapy): room air    Temp  Min: 97.7 °F (36.5 °C)  Max: 98.5 °F (36.9 °C)  Pulse  Min: 58  Max: 96  BP  Min: 115/83  Max: 173/83  MAP (mmHg)  Min: 101  Max: 120  Resp  Min: 15  Max: 22  SpO2  Min: 94 %  Max: 100 %    No intake/output data recorded.         Examination:   Constitutional: Well-nourished and -developed. No apparent distress.   Eyes: Conjunctiva clear, anicteric. Lids no lesions.  Head/Ears/Nose/Mouth/Throat/Neck: Moist mucous membranes. External ears, nose atraumatic.   Cardiovascular: Regular rhythm. No murmurs. No leg edema.  Respiratory: Comfortable respirations. Clear to auscultation.  Gastrointestinal: No hernia. Soft, nondistended, nontender. + bowel sounds.    Neurologic:  -GCS E 4 V 5 M 6  -Alert. Oriented to person, place, and time. Speech fluent. Follows commands.  -Cranial nerves: PERRL 3mm. EOM intact. No facial asymmetry noted. Gag/cough intact. Intact corneal reflex.  -Motor: All extremities 5/5 strength  -Sensation: Intact to light touch.    Medications:   Continuous Scheduled  amLODIPine 10 mg Daily    dexamethasone 4 mg Q6H   senna-docusate 8.6-50 mg 1 tablet BID   PRN  acetaminophen 650 mg Q6H PRN   hydrALAZINE 10 mg Q6H PRN   ondansetron 4 mg Q8H PRN   oxyCODONE 5 mg Q6H PRN   sodium chloride 0.9% 10 mL PRN      Today I independently reviewed pertinent medications, lines/drains/airways, imaging, cardiology results, laboratory results, notably:     ISTAT: No results for input(s): PH, PCO2, PO2, POCSATURATED, HCO3, BE, POCNA, POCK, POCTCO2, POCGLU, POCICA, POCLAC, SAMPLE in the last 24 hours.   Chem: Recent Labs   Lab 06/29/19  1135      K 4.4      CO2 24   *   BUN 10   CREATININE 1.0   ESTGFRAFRICA >60   EGFRNONAA >60   CALCIUM 10.5   MG 2.0   ANIONGAP 11   PROT 8.4   ALBUMIN 4.4   BILITOT 1.1*   ALKPHOS 100   AST 18   ALT 12   TROPONINI <0.006   BNP 81     Heme: Recent Labs   Lab 06/29/19  1135   WBC 10.65   HGB 14.8   HCT 44.3   *   INR 1.1     Endo: No results for input(s): POCTGLUCOSE in the last 24 hours.

## 2019-06-29 NOTE — ASSESSMENT & PLAN NOTE
Multiple hemorrhagic lesions with vasogenic edema seen on CTH and MRI Brain w wo: R Parietal, B Frontal, R Occipital lobes  Unclear etiology, patient with no personal history of CA, positive history of smoking  -Admit to [facility]  -Neurosurgery following  -Neuro checks q1hr  -Vital Signs q1hr  -Dexamethasone 10mg given in ED, will start Dex 4mg q6hrs  -SBP Goal < 160  -PT/OT/SLP  -CT Chest/Abd/Pelvis pending  -Mechanical SCDs for VTE prophylaxis

## 2019-06-29 NOTE — HPI
Jeffery Reyna is a 73 y.o. male with PMHx of HTN, tobacco abuse, and alcohol abuse who presented to [facility] with headache for several days with associated nausea and vomiting. He was transferred to [facility] for further care due to hemorrhagic lesions on CT.

## 2019-06-29 NOTE — SUBJECTIVE & OBJECTIVE
"  (Not in a hospital admission)    Review of patient's allergies indicates:  No Known Allergies    Past Medical History:   Diagnosis Date    DVT (deep venous thrombosis)     Hypertension      History reviewed. No pertinent surgical history.  Family History     Problem Relation (Age of Onset)    Cancer Mother    Diabetes Mother    Hypertension Sister    Thyroid disease Mother        Tobacco Use    Smoking status: Current Every Day Smoker     Packs/day: 1.00     Types: Cigarettes    Smokeless tobacco: Never Used   Substance and Sexual Activity    Alcohol use: Yes     Comment: daily "6 beers"    Drug use: No    Sexual activity: Not on file     Review of Systems  Objective:     Weight: 81.6 kg (180 lb)  Body mass index is 25.83 kg/m².  Vital Signs (Most Recent):  Temp: 98.4 °F (36.9 °C) (06/29/19 1348)  Pulse: (!) 59 (06/29/19 1800)  Resp: 17 (06/29/19 1800)  BP: (!) 151/70 (06/29/19 1800)  SpO2: 97 % (06/29/19 1800) Vital Signs (24h Range):  Temp:  [97.7 °F (36.5 °C)-98.5 °F (36.9 °C)] 98.4 °F (36.9 °C)  Pulse:  [58-96] 59  Resp:  [15-22] 17  SpO2:  [94 %-100 %] 97 %  BP: (115-173)/(70-96) 151/70     Date 06/29/19 0700 - 06/30/19 0659   Shift 9311-9403 5697-8250 4865-8913 24 Hour Total   INTAKE   IV Piggyback 1000   1000   Shift Total(mL/kg) 1000(12.2)   1000(12.2)   OUTPUT   Shift Total(mL/kg)       Weight (kg) 81.6 81.6 81.6 81.6                        Neurosurgery Physical Exam  Family at bedside, NAD.     E4V5M6; A+Ox3  Speech is slow, fluent. Clear and coherent  PERRL, EOMI, no facial droop, tongue midline  Left visual field cut  Numbness to right fingers on palmar and dorsal aspect of hand only  No weakness, no pronator drift, no dysmetria  Symmetric expansion, normal respirations  No abdominal tenderness      Significant Labs:  Recent Labs   Lab 06/29/19  1135   *      K 4.4      CO2 24   BUN 10   CREATININE 1.0   CALCIUM 10.5   MG 2.0     Recent Labs   Lab 06/29/19  1135   WBC 10.65 "   HGB 14.8   HCT 44.3   *     Recent Labs   Lab 06/29/19  1135 06/29/19  1445   INR 1.1  --    APTT  --  24.6     Microbiology Results (last 7 days)     ** No results found for the last 168 hours. **        Recent Lab Results       06/29/19  1445   06/29/19  1135        Albumin   4.4     Alcohol, Medical, Serum   <10     Alkaline Phosphatase   100     ALT   12     Ammonia   40     Anion Gap   11     aPTT 24.6  Comment:  aPTT therapeutic range = 39-69 seconds       AST   18     Baso #   0.01     Basophil%   0.1     BILIRUBIN TOTAL   1.1  Comment:  For infants and newborns, interpretation of results should be based  on gestational age, weight and in agreement with clinical  observations.  Premature Infant recommended reference ranges:  Up to 24 hours.............<8.0 mg/dL  Up to 48 hours............<12.0 mg/dL  3-5 days..................<15.0 mg/dL  6-29 days.................<15.0 mg/dL       BNP   81  Comment:  Values of less than 100 pg/ml are consistent with non-CHF populations.     BUN, Bld   10     Calcium   10.5     Chloride   103     CO2   24     Creatinine   1.0     Differential Method   Automated     eGFR if    >60     eGFR if non    >60  Comment:  Calculation used to obtain the estimated glomerular filtration  rate (eGFR) is the CKD-EPI equation.        Eos #   0.0     Eosinophil%   0.1     Glucose   139     Gran # (ANC)   9.1     Gran%   86.0     Group & Rh B POS       Hematocrit   44.3     Hemoglobin   14.8     INDIRECT MARIE NEG       Coumadin Monitoring INR   1.1  Comment:  Coumadin Therapy:  2.0 - 3.0 for INR for all indicators except mechanical heart valves  and antiphospholipid syndromes which should use 2.5 - 3.5.       Lipase   52     Lymph #   0.8     Lymph%   7.0     Magnesium   2.0     MCH   29.1     MCHC   33.4     MCV   87     Mono #   0.8     Mono%   7.1     MPV   9.1     Platelets   467     Potassium   4.4     PROTEIN TOTAL   8.4     Protime   11.1      RBC   5.08     RDW   15.1     Sodium   138     Troponin I   <0.006  Comment:  The reference interval for Troponin I represents the 99th percentile   cutoff   for our facility and is consistent with 3rd generation assay   performance.       WBC   10.65           Significant Diagnostics:  CT: Ct Head Without Contrast    Result Date: 6/29/2019  Findings most suggestive of multiple hemorrhagic meds, largest within the right frontal and parietal location with surrounding vasogenic edema. COMMUNICATION This critical result was discovered/received at 11:05 hours.  The critical information above was relayed directly by me by telephone to Dr. Montiel in the emergency room on 06/29/2019 at 11:13 hours. Electronically signed by: Leland Leavitt MD Date:    06/29/2019 Time:    11:13  Echoencephalography: No results found in the last 24 hours.  MRI: Mri Brain W Wo Contrast    Result Date: 6/29/2019  Several nonenhancing hemorrhagic lesions.  One within the right occipital lobe appears to have more vasogenic edema possibly due to recent hemorrhage and there is extension of the signal abnormality within the adjacent posterior horn of the right lateral ventricle concerning for extension of the hemorrhage within the right lateral ventricle. The etiology of these hemorrhagic lesions is unclear.  The lack of enhancement mitigates against metastases although it is not excluded. These do not represent characteristic finding of abscesses. Numerous large cavernomas could have this appearance with more recent bleed of the right occipital lesion. Short-term follow-up advised, this has to be correlated with patient's clinical status and prior medical history. Electronically signed by: Cindy Jon MD Date:    06/29/2019 Time:    16:53

## 2019-06-29 NOTE — HPI
"Jeffery Reyna is a 74 yo male with a PMH of HTN, DM who presents to [facility] ER after transfer from [facility] for 3 days of headaches that prevented him from sleeping, as well as nausea with one episode of vomiting, dizziness, and mild diarrhea with "chills" in the same time period.  He states that he had new onset of generalized headaches retroorbital, frontal and occipital to about 7-8/10 pain which are constant which began about 3 days ago. He has had no recent falls or head trauma.  He had an episode of vomiting after eating 2 days ago, and since has not had solid food and has had persistent nausea.  He also endorses dizziness which is minor and still allows him to walk during the same time period as well as Right hand numbness on review of systems. He notes no weakness and denies vision change.  He has a history of 1 pack per year smoking for 55 years, daily 6 beer per day etoh use as well as a family history of colon cancer in his brother and mother with no colonoscopy and prostate cancer to his cousin.  He has not had recent travel or other illness. He takes ASA 81 for prevention. History of a DVT in the leg many years ago to which he hs no anticoagulation or other antiplatelet medicine. Presents for work-up after several hemorrhagic brain lesions found at OSH.   "

## 2019-06-29 NOTE — ASSESSMENT & PLAN NOTE
74 yo male with a PMH of HTN, DM, daily ASA, 55 pack year smoking, daily etoh use who presents with severe headache, nausea/vomiting found to have brain lesions with hemorrhagic component suspicious for metastasis.  Awake, alert and oriented x 3 with L visual field cut, R hand numbness.     --Admit to neuro ICU for q1 hour neurochecks  --MRI Brain stealth obtained with at least 4 distinct enhancing lesions R frontal 2.5x3cm lesion, 2.3x1.4 R occipital lesion most prominent. No hydrocephalus.   --CT C/A/P with contrast for search of primary lesion  --Consult hem/onc for staging  --SBP < 160 in setting of acute hemorrhage (labetalol / hydralazine prn or cardene gtt)  --Dex 10, 4q6 for edema  --Please call nsgy with any change in exam  --Full pre-op labs  --Will follow closely

## 2019-06-29 NOTE — HPI
Patient is a 73 yr old male with a PMH of HTN and DVT who was transferred from an OSH after being found to have multiple hemorrhagic lesions on CTH. Per patient he has been having a HA x 3 days with associated nausea and vomiting. MRI Brain w and wo shows several nonenhancing hemorrhagic lesions in the right occipital lobe, right frontal and parietal lobes, and left frontal lobe with surrounding vasogenic edema. Patient denies history of CA, positive for a history of smoking and ETOH use (6 beers/day). Neurosurgery consulted. Patient started on Dexamethasone. CT Chest/abdomen/pelvis w/wo pending. Will admit to NCC for close monitoring.

## 2019-06-29 NOTE — H&P
[facility]  Neurocritical Care  History & Physical    Admit Date: 6/29/2019  Service Date: 06/29/2019  Length of Stay: 0    Subjective:     Chief Complaint: Brain metastasis    History of Present Illness: Patient is a 73 yr old male with a PMH of HTN and DVT who was transferred from an OSH after being found to have multiple hemorrhagic lesions on CTH. Per patient he has been having a HA x 3 days with associated nausea and vomiting. MRI Brain w and wo shows several nonenhancing hemorrhagic lesions in the right occipital lobe, right frontal and parietal lobes, and left frontal lobe with surrounding vasogenic edema. Patient denies history of CA, positive for a history of smoking and ETOH use (6 beers/day). Neurosurgery consulted. Patient started on Dexamethasone. CT Chest/abdomen/pelvis w/wo pending. Will admit to [facility] for close monitoring.    Review of Systems: Review of Systems   Constitutional: Negative for chills and fever.   HENT: Negative for congestion, nosebleeds and sore throat.    Eyes: Positive for blurred vision. Negative for double vision and photophobia.   Respiratory: Negative for cough, shortness of breath and wheezing.    Cardiovascular: Negative for chest pain, palpitations and leg swelling.   Gastrointestinal: Positive for nausea. Negative for vomiting.   Genitourinary: Positive for dysuria. Negative for frequency and urgency.   Musculoskeletal: Negative for back pain, joint pain and neck pain.   Skin: Negative for itching and rash.   Neurological: Positive for headaches. Negative for dizziness, sensory change, speech change, focal weakness, loss of consciousness and weakness.     Vitals:   Temp: 98.4 °F (36.9 °C)  Pulse: (!) 58  BP: (!) 156/84  MAP (mmHg): 114  Resp: 20  SpO2: 95 %  O2 Device (Oxygen Therapy): room air    Temp  Min: 97.7 °F (36.5 °C)  Max: 98.5 °F (36.9 °C)  Pulse  Min: 58  Max: 96  BP  Min: 115/83  Max: 173/83  MAP (mmHg)  Min: 101  Max: 120  Resp  Min: 15  Max:  22  SpO2  Min: 94 %  Max: 100 %    No intake/output data recorded.         Examination:   Constitutional: Well-nourished and -developed. No apparent distress.   Eyes: Conjunctiva clear, anicteric. Lids no lesions.  Head/Ears/Nose/Mouth/Throat/Neck: Moist mucous membranes. External ears, nose atraumatic.   Cardiovascular: Regular rhythm. No murmurs. No leg edema.  Respiratory: Comfortable respirations. Clear to auscultation.  Gastrointestinal: No hernia. Soft, nondistended, nontender. + bowel sounds.    Neurologic:  -GCS E 4 V 5 M 6  -Alert. Oriented to person, place, and time. Speech fluent. Follows commands.  -Cranial nerves: PERRL 3mm. EOM intact. No facial asymmetry noted. Gag/cough intact. Intact corneal reflex.  -Motor: All extremities 5/5 strength  -Sensation: Intact to light touch.    Medications:   Continuous Scheduled  amLODIPine 10 mg Daily   dexamethasone 4 mg Q6H   senna-docusate 8.6-50 mg 1 tablet BID   PRN  acetaminophen 650 mg Q6H PRN   hydrALAZINE 10 mg Q6H PRN   ondansetron 4 mg Q8H PRN   oxyCODONE 5 mg Q6H PRN   sodium chloride 0.9% 10 mL PRN      Today I independently reviewed pertinent medications, lines/drains/airways, imaging, cardiology results, laboratory results, notably:     ISTAT: No results for input(s): PH, PCO2, PO2, POCSATURATED, HCO3, BE, POCNA, POCK, POCTCO2, POCGLU, POCICA, POCLAC, SAMPLE in the last 24 hours.   Chem: Recent Labs   Lab 06/29/19  1135      K 4.4      CO2 24   *   BUN 10   CREATININE 1.0   ESTGFRAFRICA >60   EGFRNONAA >60   CALCIUM 10.5   MG 2.0   ANIONGAP 11   PROT 8.4   ALBUMIN 4.4   BILITOT 1.1*   ALKPHOS 100   AST 18   ALT 12   TROPONINI <0.006   BNP 81     Heme: Recent Labs   Lab 06/29/19  1135   WBC 10.65   HGB 14.8   HCT 44.3   *   INR 1.1     Endo: No results for input(s): POCTGLUCOSE in the last 24 hours.       Assessment/Plan:     Neuro  Brain lesion  Multiple hemorrhagic lesions with vasogenic edema seen on CTH and MRI Brain w wo:  R Parietal, B Frontal, R Occipital lobes  Unclear etiology, patient with no personal history of CA, positive history of smoking  -Admit to NCC  -Neurosurgery following  -Neuro checks q1hr  -Vital Signs q1hr  -Dexamethasone 10mg given in ED, will start Dex 4mg q6hrs  -SBP Goal < 160  -PT/OT/SLP  -CT Chest/Abd/Pelvis pending  -Mechanical SCDs for VTE prophylaxis    Nontraumatic multiple localized intracerebral hemorrhages  -See brain metastasis    Vasogenic brain edema  -2/2 Brain metastasis    Psychiatric  Alcohol abuse, daily use  Drinks 6 beers/day  -Monitor for withdrawal  -Banana bag x 1  -Thiamine, Folate started    Cardiac/Vascular  Essential hypertension  SBP Goal < 160  -Home amlodipine started    Hematology  History of DVT (deep vein thrombosis)  History of DVT  Unable to AC due to hemorrhagic lesions  -Obtain doppler US BLE to r/o active DVT    Oncology  * Brain metastasis  -See Brain Lesion      Other  Tobacco use disorder  History of smoking 1PPD  -May need Nicotine patch  -Smoking cessation counseling when appropriate        The patient is being Prophylaxed for:  Venous Thromboembolism with: Mechanical  Stress Ulcer with: Not Applicable   Ventilator Pneumonia with: not applicable    Activity Orders          Diet NPO: NPO starting at 06/29 1734    Commode at bedside starting at 06/29 1734        Full Code    CC Time spent: 35 minutes    Krystyna Campo NP  Neurocritical Care  [facility]

## 2019-06-29 NOTE — HOSPITAL COURSE
6/29/2019: Admitted to [facility]. CT chest/abd/pelvis pending.  06/30/2019 CT with R upper lobe mass and multiple hepatic lesions. Neurologically intact. Step down to hospital medicine.

## 2019-06-29 NOTE — ED PROVIDER NOTES
Encounter Date: 6/29/2019    SCRIBE #1 NOTE: I, Kalyan Mosqueda, am scribing for, and in the presence of,  Dr. Campos . I have scribed the entire note.       History     Chief Complaint   Patient presents with    Abdominal Pain     Abdominal pain with nausea and vomiting.  Reports diarrhea last night.  Sent from Urgent Care for further eval.     This is a 73 y.o. male with history of alcohol consumption (1 beer daily) and smoking tobacco who presents to the ED complaining of abrupt, upper abdominal pain with associated nausea, vomiting, and diarrhea for 2 days. He had 2 episodes of vomiting and diarrhea each. Nothing exacerbates his stomach pain. Patient reports subjective fever that started today, coughing when vomiting, dizziness (described as about to fall), eating and drinking well, and HA. His headache is came on gradually, is  located frontally, described as gnawing, and causing him enough distress to where he can't sleep. He denies hematemesis, hematochezia, substance abuse, chest pain, and recent falls or injury. His wife denies seeing a mental status change and confusion. He had vomited his salami sausage sandwich today, but is not concerned about eating bad food as his son ate it without any problems.       The history is provided by the patient and the spouse. No  was used.     Review of patient's allergies indicates:  No Known Allergies  Past Medical History:   Diagnosis Date    DVT (deep venous thrombosis)     Hypertension      History reviewed. No pertinent surgical history.  Family History   Problem Relation Age of Onset    Diabetes Mother     Thyroid disease Mother     Cancer Mother     Hypertension Sister      Social History     Tobacco Use    Smoking status: Current Every Day Smoker     Packs/day: 0.50     Types: Cigarettes    Smokeless tobacco: Never Used   Substance Use Topics    Alcohol use: Yes    Drug use: No     Review of Systems   Constitutional: Positive for  fever (subjective). Negative for appetite change.   HENT: Negative for congestion.    Eyes: Negative for discharge.   Respiratory: Positive for cough.    Cardiovascular: Negative for chest pain.   Gastrointestinal: Positive for abdominal pain, diarrhea, nausea and vomiting. Negative for blood in stool.   Genitourinary:        Positive for slow urination   Musculoskeletal: Negative for neck stiffness.   Skin: Negative for rash.   Neurological: Positive for dizziness (feels like he's going to fall) and headaches (posterior). Negative for syncope.   Psychiatric/Behavioral: Negative for confusion.       Physical Exam     Initial Vitals [06/29/19 0942]   BP Pulse Resp Temp SpO2   (!) 146/85 71 16 97.7 °F (36.5 °C) 100 %      MAP       --         Physical Exam    Nursing note and vitals reviewed.  Constitutional: He appears well-developed and well-nourished.   HENT:   Head: Normocephalic and atraumatic.   Eyes: EOM are normal. Pupils are equal, round, and reactive to light.   Neck: Neck supple. No thyromegaly present. No JVD present.   No meningeal signs   Cardiovascular: Normal rate and regular rhythm. Exam reveals no gallop and no friction rub.    No murmur heard.  Pulmonary/Chest: Breath sounds normal. No respiratory distress.   Abdominal: Soft. Bowel sounds are normal. There is no tenderness. There is no CVA tenderness.   Musculoskeletal: Normal range of motion. He exhibits no edema or tenderness.   Neurological: He is alert and oriented to person, place, and time. He has normal strength. GCS score is 15. GCS eye subscore is 4. GCS verbal subscore is 5. GCS motor subscore is 6.   Normal neurological exam   Skin: Skin is warm and dry. Capillary refill takes less than 2 seconds.         ED Course   Procedures  Labs Reviewed   CBC W/ AUTO DIFFERENTIAL - Abnormal; Notable for the following components:       Result Value    RDW 15.1 (*)     Platelets 467 (*)     MPV 9.1 (*)     Gran # (ANC) 9.1 (*)     Lymph # 0.8 (*)      Gran% 86.0 (*)     Lymph% 7.0 (*)     All other components within normal limits   COMPREHENSIVE METABOLIC PANEL - Abnormal; Notable for the following components:    Glucose 139 (*)     Total Bilirubin 1.1 (*)     All other components within normal limits   ALCOHOL,MEDICAL (ETHANOL)   MAGNESIUM   LIPASE   AMMONIA   TROPONIN I   B-TYPE NATRIURETIC PEPTIDE   PROTIME-INR   URINALYSIS, REFLEX TO URINE CULTURE          Imaging Results          CT Head Without Contrast (Final result)  Result time 06/29/19 11:13:48    Final result by Leland Leavitt MD (06/29/19 11:13:48)                 Impression:      Findings most suggestive of multiple hemorrhagic meds, largest within the right frontal and parietal location with surrounding vasogenic edema.    COMMUNICATION  This critical result was discovered/received at 11:05 hours.  The critical information above was relayed directly by me by telephone to Dr. Montiel in the emergency room on 06/29/2019 at 11:13 hours.      Electronically signed by: Leland Leavitt MD  Date:    06/29/2019  Time:    11:13             Narrative:    EXAMINATION:  CT HEAD WITHOUT CONTRAST    CLINICAL HISTORY:  Headache, acute, severe, thunderclap, worst HA of life;    TECHNIQUE:  Low dose axial images were obtained through the head.  Coronal and sagittal reformations were also performed. Contrast was not administered.    COMPARISON:  None.    FINDINGS:  Multiple hyperdense/hemorrhagic masses present within the bilateral cerebral hemispheres largest within the right frontal (2.4 x 2.3 cm) and right parietal (3.3 x 1.7 cm) locations.  Larger lesions demonstrate surrounding vasogenic edema.  There is some mild right cerebral hemisphere sulcal effacement at these locations.  Smaller left frontal and left basal ganglia foci noted.  There is a 4 mm inferior pontine lesion as well.    No significant extra-axial hemorrhage.  No hydrocephalus or midline shift.  Basilar cisterns patent.    Osseous structures  intact.  Mastoid air cells and paranasal sinuses clear.                                 Medical Decision Making:   History:   Old Medical Records: I decided to obtain old medical records.  Initial Assessment:   This is a 73 y.o. male with history of alcohol consumption (1 beer daily) and smoking tobacco who presents to the ED complaining of abrupt, upper abdominal pain with associated nausea, vomiting, and diarrhea for 2 days. He had 2 episodes of vomiting and diarrhea each. Nothing exacerbates his stomach pain. Patient reports subjective fever that started today, coughing when vomiting, dizziness (described as about to fall), eating and drinking well, and HA. His headache is came on gradually, is  located frontally, described as gnawing, and causing him enough distress to where he can't sleep. He denies hematemesis, hematochezia, substance abuse, chest pain, and recent falls or injury.  Differential Diagnosis:   Differential diagnosis includes but is not limited to:   Viral syndrome, gastroenteritis, dehydration, eletrolyte abnormality.   ED Management:  After taking into careful account the historical factors and physical exam findings of the patient's presentation today, in conjunction with the empirical and objective data obtained on ED workup, no acute emergent medical condition requiring admission has been identified. The patient appears to be low risk for an emergent medical condition and I feel it is safe and appropriate at this time for the patient to be discharged to follow-up as detailed in their discharge instructions for reevaluation and possible continued outpatient workup and management. I have discussed the specifics of the workup with the patient and the patient has verbalized understanding of the details of the workup, the diagnosis, the treatment plan, and the need for outpatient follow-up.  Although the patient has no emergent etiology today this does not preclude the development of an emergent  condition so in addition, I have advised the patient that they can return to the ED and/or activate EMS at any time with worsening of their symptoms, change of their symptoms, or with any other medical complaint.  The patient remained comfortable and stable during their visit in the ED.  Discharge and follow-up instructions discussed with the patient who expressed understanding and willingness to comply with my recommendations.     This medical record was prepared using voice dictation software. There may be phonetic errors.     Patient found to have 2 fairly large hemorrhagic masses concerning for metastatic disease on CT head.  Transfer ED to ED to [facility] for further evaluation, workup, treatment.          Scribe Attestation:   Scribe #1: I performed the above scribed service and the documentation accurately describes the services I performed. I attest to the accuracy of the note.               Clinical Impression:       ICD-10-CM ICD-9-CM   1. Brain metastasis C79.31 198.3   2. Weakness R53.1 780.79   3. Abdominal pain, unspecified abdominal location R10.9 789.00   4. Dizziness R42 780.4   5. Nonintractable headache, unspecified chronicity pattern, unspecified headache type R51 784.0   6. Nausea and vomiting, intractability of vomiting not specified, unspecified vomiting type R11.2 787.01   7. Diarrhea, unspecified type R19.7 787.91                           Scribe attestation: I, Radu Campos, personally performed the services described in this documentation. All medical record entries made by the scribe were at my direction and in my presence. I have reviewed the chart and agree that the record reflects my personal performance and is accurate and complete         Radu Campos MD  06/29/19 0036

## 2019-06-30 PROBLEM — C78.7 LIVER METASTASIS: Status: ACTIVE | Noted: 2019-06-30

## 2019-06-30 PROBLEM — R91.8 MASS OF UPPER LOBE OF RIGHT LUNG: Status: ACTIVE | Noted: 2019-06-30

## 2019-06-30 LAB
ALBUMIN SERPL BCP-MCNC: 3.9 G/DL (ref 3.5–5.2)
ALLENS TEST: ABNORMAL
ALP SERPL-CCNC: 97 U/L (ref 55–135)
ALT SERPL W/O P-5'-P-CCNC: 13 U/L (ref 10–44)
ANION GAP SERPL CALC-SCNC: 13 MMOL/L (ref 8–16)
ASCENDING AORTA: 2.88 CM
AST SERPL-CCNC: 19 U/L (ref 10–40)
AV INDEX (PROSTH): 0.79
AV MEAN GRADIENT: 3 MMHG
AV PEAK GRADIENT: 5 MMHG
AV VALVE AREA: 3.55 CM2
AV VELOCITY RATIO: 0.7
BASOPHILS # BLD AUTO: 0.02 K/UL (ref 0–0.2)
BASOPHILS NFR BLD: 0.2 % (ref 0–1.9)
BILIRUB SERPL-MCNC: 1.1 MG/DL (ref 0.1–1)
BSA FOR ECHO PROCEDURE: 2.08 M2
BUN SERPL-MCNC: 10 MG/DL (ref 8–23)
CALCIUM SERPL-MCNC: 9.8 MG/DL (ref 8.7–10.5)
CHLORIDE SERPL-SCNC: 105 MMOL/L (ref 95–110)
CO2 SERPL-SCNC: 18 MMOL/L (ref 23–29)
CREAT SERPL-MCNC: 0.8 MG/DL (ref 0.5–1.4)
CV ECHO LV RWT: 0.3 CM
DELSYS: ABNORMAL
DIFFERENTIAL METHOD: ABNORMAL
DOP CALC AO PEAK VEL: 1.17 M/S
DOP CALC AO VTI: 22.07 CM
DOP CALC LVOT AREA: 4.5 CM2
DOP CALC LVOT DIAMETER: 2.39 CM
DOP CALC LVOT PEAK VEL: 0.82 M/S
DOP CALC LVOT STROKE VOLUME: 78.38 CM3
DOP CALCLVOT PEAK VEL VTI: 17.48 CM
E WAVE DECELERATION TIME: 186.23 MSEC
E/A RATIO: 1.34
E/E' RATIO: 9.65 M/S
ECHO LV POSTERIOR WALL: 0.74 CM (ref 0.6–1.1)
EOSINOPHIL # BLD AUTO: 0 K/UL (ref 0–0.5)
EOSINOPHIL NFR BLD: 0.2 % (ref 0–8)
ERYTHROCYTE [DISTWIDTH] IN BLOOD BY AUTOMATED COUNT: 15.5 % (ref 11.5–14.5)
ERYTHROCYTE [SEDIMENTATION RATE] IN BLOOD BY WESTERGREN METHOD: 12 MM/H
EST. GFR  (AFRICAN AMERICAN): >60 ML/MIN/1.73 M^2
EST. GFR  (NON AFRICAN AMERICAN): >60 ML/MIN/1.73 M^2
FIO2: 21
FRACTIONAL SHORTENING: 45 % (ref 28–44)
GLUCOSE SERPL-MCNC: 138 MG/DL (ref 70–110)
HCO3 UR-SCNC: 22.4 MMOL/L (ref 24–28)
HCT VFR BLD AUTO: 45 % (ref 40–54)
HGB BLD-MCNC: 15.2 G/DL (ref 14–18)
IMM GRANULOCYTES # BLD AUTO: 0.09 K/UL (ref 0–0.04)
IMM GRANULOCYTES NFR BLD AUTO: 0.8 % (ref 0–0.5)
INTERVENTRICULAR SEPTUM: 0.71 CM (ref 0.6–1.1)
IVRT: 0.11 MSEC
LA MAJOR: 5.17 CM
LA MINOR: 5.16 CM
LA WIDTH: 3.51 CM
LEFT ATRIUM SIZE: 2.43 CM
LEFT ATRIUM VOLUME INDEX: 18.2 ML/M2
LEFT ATRIUM VOLUME: 37.45 CM3
LEFT INTERNAL DIMENSION IN SYSTOLE: 2.72 CM (ref 2.1–4)
LEFT VENTRICLE DIASTOLIC VOLUME INDEX: 55.95 ML/M2
LEFT VENTRICLE DIASTOLIC VOLUME: 115.03 ML
LEFT VENTRICLE MASS INDEX: 57 G/M2
LEFT VENTRICLE SYSTOLIC VOLUME INDEX: 13.3 ML/M2
LEFT VENTRICLE SYSTOLIC VOLUME: 27.4 ML
LEFT VENTRICULAR INTERNAL DIMENSION IN DIASTOLE: 4.94 CM (ref 3.5–6)
LEFT VENTRICULAR MASS: 117.38 G
LV LATERAL E/E' RATIO: 8.2 M/S
LV SEPTAL E/E' RATIO: 11.71 M/S
LYMPHOCYTES # BLD AUTO: 0.8 K/UL (ref 1–4.8)
LYMPHOCYTES NFR BLD: 7.1 % (ref 18–48)
MAGNESIUM SERPL-MCNC: 1.7 MG/DL (ref 1.6–2.6)
MCH RBC QN AUTO: 28.7 PG (ref 27–31)
MCHC RBC AUTO-ENTMCNC: 33.8 G/DL (ref 32–36)
MCV RBC AUTO: 85 FL (ref 82–98)
MODE: ABNORMAL
MONOCYTES # BLD AUTO: 0.3 K/UL (ref 0.3–1)
MONOCYTES NFR BLD: 2.5 % (ref 4–15)
MV PEAK A VEL: 0.61 M/S
MV PEAK E VEL: 0.82 M/S
NEUTROPHILS # BLD AUTO: 9.7 K/UL (ref 1.8–7.7)
NEUTROPHILS NFR BLD: 89.2 % (ref 38–73)
NRBC BLD-RTO: 0 /100 WBC
PCO2 BLDA: 37.6 MMHG (ref 35–45)
PH SMN: 7.38 [PH] (ref 7.35–7.45)
PHOSPHATE SERPL-MCNC: 3 MG/DL (ref 2.7–4.5)
PISA TR MAX VEL: 2.71 M/S
PLATELET # BLD AUTO: 460 K/UL (ref 150–350)
PMV BLD AUTO: 8.9 FL (ref 9.2–12.9)
PO2 BLDA: 71 MMHG (ref 80–100)
POC BE: -3 MMOL/L
POC SATURATED O2: 94 % (ref 95–100)
POC TCO2: 24 MMOL/L (ref 23–27)
POCT GLUCOSE: 145 MG/DL (ref 70–110)
POCT GLUCOSE: 150 MG/DL (ref 70–110)
POCT GLUCOSE: 156 MG/DL (ref 70–110)
POCT GLUCOSE: 191 MG/DL (ref 70–110)
POTASSIUM SERPL-SCNC: 4.2 MMOL/L (ref 3.5–5.1)
PROT SERPL-MCNC: 7.7 G/DL (ref 6–8.4)
PULM VEIN S/D RATIO: 0.87
PV PEAK D VEL: 0.54 M/S
PV PEAK S VEL: 0.47 M/S
RA MAJOR: 5.23 CM
RA PRESSURE: 3 MMHG
RA WIDTH: 3.51 CM
RBC # BLD AUTO: 5.29 M/UL (ref 4.6–6.2)
RIGHT VENTRICULAR END-DIASTOLIC DIMENSION: 3.5 CM
RV TISSUE DOPPLER FREE WALL SYSTOLIC VELOCITY 1 (APICAL 4 CHAMBER VIEW): 10.65 CM/S
SAMPLE: ABNORMAL
SINUS: 3.08 CM
SITE: ABNORMAL
SODIUM SERPL-SCNC: 136 MMOL/L (ref 136–145)
SP02: 93
STJ: 3.02 CM
TDI LATERAL: 0.1 M/S
TDI SEPTAL: 0.07 M/S
TDI: 0.09 M/S
TR MAX PG: 29 MMHG
TRICUSPID ANNULAR PLANE SYSTOLIC EXCURSION: 2.44 CM
TV REST PULMONARY ARTERY PRESSURE: 32 MMHG
WBC # BLD AUTO: 10.86 K/UL (ref 3.9–12.7)

## 2019-06-30 PROCEDURE — 85025 COMPLETE CBC W/AUTO DIFF WBC: CPT

## 2019-06-30 PROCEDURE — 97161 PT EVAL LOW COMPLEX 20 MIN: CPT

## 2019-06-30 PROCEDURE — 63600175 PHARM REV CODE 636 W HCPCS: Performed by: NURSE PRACTITIONER

## 2019-06-30 PROCEDURE — 83735 ASSAY OF MAGNESIUM: CPT

## 2019-06-30 PROCEDURE — 82803 BLOOD GASES ANY COMBINATION: CPT

## 2019-06-30 PROCEDURE — 97165 OT EVAL LOW COMPLEX 30 MIN: CPT

## 2019-06-30 PROCEDURE — 99222 PR INITIAL HOSPITAL CARE,LEVL II: ICD-10-PCS | Mod: ,,, | Performed by: INTERNAL MEDICINE

## 2019-06-30 PROCEDURE — 99233 PR SUBSEQUENT HOSPITAL CARE,LEVL III: ICD-10-PCS | Mod: GC,,, | Performed by: NEUROLOGICAL SURGERY

## 2019-06-30 PROCEDURE — 11000001 HC ACUTE MED/SURG PRIVATE ROOM

## 2019-06-30 PROCEDURE — 99222 1ST HOSP IP/OBS MODERATE 55: CPT | Mod: ,,, | Performed by: INTERNAL MEDICINE

## 2019-06-30 PROCEDURE — 92610 EVALUATE SWALLOWING FUNCTION: CPT

## 2019-06-30 PROCEDURE — 25000003 PHARM REV CODE 250: Performed by: NURSE PRACTITIONER

## 2019-06-30 PROCEDURE — 84100 ASSAY OF PHOSPHORUS: CPT

## 2019-06-30 PROCEDURE — 99233 SBSQ HOSP IP/OBS HIGH 50: CPT | Mod: GC,,, | Performed by: NEUROLOGICAL SURGERY

## 2019-06-30 PROCEDURE — 80053 COMPREHEN METABOLIC PANEL: CPT

## 2019-06-30 PROCEDURE — 99233 PR SUBSEQUENT HOSPITAL CARE,LEVL III: ICD-10-PCS | Mod: GC,,, | Performed by: PSYCHIATRY & NEUROLOGY

## 2019-06-30 PROCEDURE — 36600 WITHDRAWAL OF ARTERIAL BLOOD: CPT

## 2019-06-30 PROCEDURE — 99900035 HC TECH TIME PER 15 MIN (STAT)

## 2019-06-30 PROCEDURE — 27000221 HC OXYGEN, UP TO 24 HOURS

## 2019-06-30 PROCEDURE — 99233 SBSQ HOSP IP/OBS HIGH 50: CPT | Mod: GC,,, | Performed by: PSYCHIATRY & NEUROLOGY

## 2019-06-30 RX ORDER — SODIUM,POTASSIUM PHOSPHATES 280-250MG
2 POWDER IN PACKET (EA) ORAL
Status: DISCONTINUED | OUTPATIENT
Start: 2019-06-30 | End: 2019-07-01

## 2019-06-30 RX ORDER — POTASSIUM CHLORIDE 20 MEQ/15ML
40 SOLUTION ORAL
Status: DISCONTINUED | OUTPATIENT
Start: 2019-06-30 | End: 2019-06-30

## 2019-06-30 RX ORDER — IBUPROFEN 200 MG
16 TABLET ORAL
Status: DISCONTINUED | OUTPATIENT
Start: 2019-06-30 | End: 2019-07-02 | Stop reason: HOSPADM

## 2019-06-30 RX ORDER — LANOLIN ALCOHOL/MO/W.PET/CERES
800 CREAM (GRAM) TOPICAL
Status: DISCONTINUED | OUTPATIENT
Start: 2019-06-30 | End: 2019-07-01

## 2019-06-30 RX ORDER — GLUCAGON 1 MG
1 KIT INJECTION
Status: DISCONTINUED | OUTPATIENT
Start: 2019-06-30 | End: 2019-07-02 | Stop reason: HOSPADM

## 2019-06-30 RX ORDER — IBUPROFEN 200 MG
24 TABLET ORAL
Status: DISCONTINUED | OUTPATIENT
Start: 2019-06-30 | End: 2019-07-02 | Stop reason: HOSPADM

## 2019-06-30 RX ORDER — POTASSIUM CHLORIDE 20 MEQ/15ML
40 SOLUTION ORAL
Status: DISCONTINUED | OUTPATIENT
Start: 2019-06-30 | End: 2019-07-01

## 2019-06-30 RX ORDER — INSULIN ASPART 100 [IU]/ML
1-10 INJECTION, SOLUTION INTRAVENOUS; SUBCUTANEOUS
Status: DISCONTINUED | OUTPATIENT
Start: 2019-06-30 | End: 2019-07-02 | Stop reason: HOSPADM

## 2019-06-30 RX ORDER — POTASSIUM CHLORIDE 20 MEQ/15ML
60 SOLUTION ORAL
Status: DISCONTINUED | OUTPATIENT
Start: 2019-06-30 | End: 2019-07-01

## 2019-06-30 RX ORDER — FAMOTIDINE 20 MG/1
20 TABLET, FILM COATED ORAL DAILY
Status: DISCONTINUED | OUTPATIENT
Start: 2019-06-30 | End: 2019-07-02 | Stop reason: HOSPADM

## 2019-06-30 RX ADMIN — ACETAMINOPHEN 650 MG: 325 TABLET ORAL at 02:06

## 2019-06-30 RX ADMIN — SENNOSIDES,DOCUSATE SODIUM 1 TABLET: 8.6; 5 TABLET, FILM COATED ORAL at 09:06

## 2019-06-30 RX ADMIN — OXYCODONE HYDROCHLORIDE 5 MG: 5 TABLET ORAL at 10:06

## 2019-06-30 RX ADMIN — FAMOTIDINE 20 MG: 20 TABLET, FILM COATED ORAL at 10:06

## 2019-06-30 RX ADMIN — OXYCODONE HYDROCHLORIDE 5 MG: 5 TABLET ORAL at 03:06

## 2019-06-30 RX ADMIN — Medication 100 MG: at 09:06

## 2019-06-30 RX ADMIN — MAGNESIUM OXIDE TAB 400 MG (241.3 MG ELEMENTAL MG) 800 MG: 400 (241.3 MG) TAB at 05:06

## 2019-06-30 RX ADMIN — ACETAMINOPHEN 650 MG: 325 TABLET ORAL at 09:06

## 2019-06-30 RX ADMIN — DEXAMETHASONE SODIUM PHOSPHATE 4 MG: 4 INJECTION, SOLUTION INTRAMUSCULAR; INTRAVENOUS at 05:06

## 2019-06-30 RX ADMIN — ACETAMINOPHEN 650 MG: 325 TABLET ORAL at 05:06

## 2019-06-30 RX ADMIN — INSULIN ASPART 2 UNITS: 100 INJECTION, SOLUTION INTRAVENOUS; SUBCUTANEOUS at 05:06

## 2019-06-30 RX ADMIN — AMLODIPINE BESYLATE 10 MG: 10 TABLET ORAL at 09:06

## 2019-06-30 RX ADMIN — DEXAMETHASONE SODIUM PHOSPHATE 4 MG: 4 INJECTION, SOLUTION INTRAMUSCULAR; INTRAVENOUS at 12:06

## 2019-06-30 RX ADMIN — FOLIC ACID 1 MG: 1 TABLET ORAL at 09:06

## 2019-06-30 RX ADMIN — MAGNESIUM OXIDE TAB 400 MG (241.3 MG ELEMENTAL MG) 800 MG: 400 (241.3 MG) TAB at 01:06

## 2019-06-30 NOTE — ASSESSMENT & PLAN NOTE
-- History of DVT. Not on AC  -- US LE with superficial acute/subacute thrombosis in the R saphenous vein and evidence of chronic DVT in the LLE with decreased clot burden compared to 2014.   -- Holding off on AC for now given hemorrhagic lesion.

## 2019-06-30 NOTE — ASSESSMENT & PLAN NOTE
-- CT chest with a large spiculated mass in the right upper lobe of the lung concerning for malignancy.  -- Hem/Onc following. Recommending Rad Onc and Palliative consult.  -- Pulmonology consulted for lung biopsy. Recommended IR consult for liver biopsy

## 2019-06-30 NOTE — SUBJECTIVE & OBJECTIVE
"Past Medical History:   Diagnosis Date    DVT (deep venous thrombosis)     Hypertension      History reviewed. No pertinent surgical history.    Review of patient's allergies indicates:  No Known Allergies    Family History     Problem Relation (Age of Onset)    Cancer Mother    Diabetes Mother    Hypertension Sister    Thyroid disease Mother        Tobacco Use    Smoking status: Current Every Day Smoker     Packs/day: 1.00     Types: Cigarettes    Smokeless tobacco: Never Used   Substance and Sexual Activity    Alcohol use: Yes     Comment: daily "6 beers"    Drug use: No    Sexual activity: Not on file       Review of Systems   Constitutional: Positive for chills and unexpected weight change. Negative for fever.   HENT: Negative for congestion, postnasal drip, rhinorrhea and sinus pain.    Eyes: Negative for pain and itching.   Respiratory: Negative for cough, shortness of breath and wheezing.    Cardiovascular: Negative for chest pain and leg swelling.   Gastrointestinal: Negative for abdominal pain, diarrhea and nausea.   Genitourinary: Negative for difficulty urinating and frequency.   Musculoskeletal: Negative for arthralgias and myalgias.   Neurological: Negative for dizziness and light-headedness.     Objective:     Vital Signs (Most Recent):  Temp: 98 °F (36.7 °C) (06/30/19 1502)  Pulse: 60 (06/30/19 1502)  Resp: 20 (06/30/19 1502)  BP: 135/74 (06/30/19 1502)  SpO2: (!) 94 % (06/30/19 1502) Vital Signs (24h Range):  Temp:  [98 °F (36.7 °C)-98.4 °F (36.9 °C)] 98 °F (36.7 °C)  Pulse:  [53-86] 60  Resp:  [10-21] 20  SpO2:  [92 %-98 %] 94 %  BP: (123-158)/(65-86) 135/74     Weight: 87.5 kg (193 lb)  Body mass index is 27.69 kg/m².    Intake/Output Summary (Last 24 hours) at 6/30/2019 1553  Last data filed at 6/30/2019 1402  Gross per 24 hour   Intake 2057.5 ml   Output 1850 ml   Net 207.5 ml     Physical Exam   Constitutional: He is oriented to person, place, and time. He appears well-developed and " well-nourished. No distress.   HENT:   Head: Normocephalic and atraumatic.   Nose: Nose normal.   Mouth/Throat: Oropharynx is clear and moist. No oropharyngeal exudate.   Eyes: Pupils are equal, round, and reactive to light. Conjunctivae are normal.   Neck: Normal range of motion. Neck supple.   Cardiovascular: Normal rate, regular rhythm and normal heart sounds.   Pulmonary/Chest: Effort normal and breath sounds normal. He has no wheezes. He has no rales.   +crackles anteriorly on R side   Abdominal: Soft. He exhibits distension. There is no tenderness.   Musculoskeletal: Normal range of motion. He exhibits no edema.   Neurological: He is alert and oriented to person, place, and time.   Skin: Skin is warm and dry. He is not diaphoretic.   Psychiatric: He has a normal mood and affect. His behavior is normal.   Nursing note and vitals reviewed.    Lines/Drains/Airways     Peripheral Intravenous Line                 Peripheral IV - Single Lumen 06/29/19 1032 20 G Left Hand 1 day         Peripheral IV - Single Lumen 06/29/19 1402 20 G Left Wrist 1 day              Significant Labs:    CBC/Anemia Profile:  Recent Labs   Lab 06/29/19  1135 06/30/19  0139   WBC 10.65 10.86   HGB 14.8 15.2   HCT 44.3 45.0   * 460*   MCV 87 85   RDW 15.1* 15.5*     Chemistries:  Recent Labs   Lab 06/29/19  1135 06/29/19  1757 06/30/19  0229     --  136   K 4.4  --  4.2     --  105   CO2 24  --  18*   BUN 10  --  10   CREATININE 1.0  --  0.8   CALCIUM 10.5  --  9.8   ALBUMIN 4.4 4.1 3.9   PROT 8.4 8.1 7.7   BILITOT 1.1* 1.2* 1.1*   ALKPHOS 100 102 97   ALT 12 14 13   AST 18 17 19   MG 2.0  --  1.7   PHOS  --   --  3.0     All pertinent labs within the past 24 hours have been reviewed.    Significant Imaging:   I have reviewed and interpreted all pertinent imaging results/findings within the past 24 hours.

## 2019-06-30 NOTE — CONSULTS
Please refer to Consult Note dated 6/30/19 by Dr. Hosea Schmid. Contact Medical Oncology service with any additional questions.    Christine Goins MD PGY-IV  Hematology/Oncology Fellow

## 2019-06-30 NOTE — SUBJECTIVE & OBJECTIVE
Interval History: naeon    Medications:  Continuous Infusions:   lactated ringers 50 mL/hr at 06/30/19 0605     Scheduled Meds:   amLODIPine  10 mg Oral Daily    dexamethasone  4 mg Intravenous Q6H    folic acid  1 mg Oral Daily    senna-docusate 8.6-50 mg  1 tablet Oral BID    thiamine  100 mg Oral Daily     PRN Meds:acetaminophen, hydrALAZINE, ondansetron, oxyCODONE, sodium chloride 0.9%     Review of Systems  Objective:     Weight: 87.9 kg (193 lb 12.6 oz)  Body mass index is 27.81 kg/m².  Vital Signs (Most Recent):  Temp: 98.4 °F (36.9 °C) (06/30/19 0305)  Pulse: 64 (06/30/19 0605)  Resp: 12 (06/30/19 0605)  BP: (!) 158/85 (06/30/19 0605)  SpO2: (!) 94 % (06/30/19 0605) Vital Signs (24h Range):  Temp:  [97.7 °F (36.5 °C)-98.5 °F (36.9 °C)] 98.4 °F (36.9 °C)  Pulse:  [55-96] 64  Resp:  [10-22] 12  SpO2:  [92 %-100 %] 94 %  BP: (115-173)/(65-96) 158/85                          Neurosurgery Physical Exam   AOX3, speech fluent  PERRL, face symm, tongue midline  GIL symm  SILT  No drift    Significant Labs:  Recent Labs   Lab 06/29/19  1135 06/30/19  0229   * 138*    136   K 4.4 4.2    105   CO2 24 18*   BUN 10 10   CREATININE 1.0 0.8   CALCIUM 10.5 9.8   MG 2.0 1.7     Recent Labs   Lab 06/29/19  1135 06/30/19  0139   WBC 10.65 10.86   HGB 14.8 15.2   HCT 44.3 45.0   * 460*     Recent Labs   Lab 06/29/19  1135 06/29/19  1445   INR 1.1  --    APTT  --  24.6     Microbiology Results (last 7 days)     ** No results found for the last 168 hours. **            Significant Diagnostics:  MRI brain: multiple hemorrhagic lesions concerning for metastatic process  CT c/a/p: right lung lesion, multiple liver lesions, thyroid nodule.

## 2019-06-30 NOTE — PT/OT/SLP EVAL
Occupational Therapy   Evaluation and Discharge Note    Name: Jeffery Reyna  MRN: [id number]  Admitting Diagnosis:  Brain metastasis      Recommendations:     Discharge Recommendations: home  Discharge Equipment Recommendations:  none  Barriers to discharge:  None    Assessment:     Jeffery Reyna is a 73 y.o. male with a medical diagnosis of Brain metastasis. At this time, patient is functioning at their prior level of function and does not require further acute OT services.     Plan:     During this hospitalization, patient does not require further acute OT services.  Please re-consult if situation changes.    · Plan of Care Reviewed with: patient    Subjective     Chief Complaint: No complaints  Patient/Family Comments/goals: return home    Occupational Profile:  Living Environment: Pt lives in a [facility] w/ TH to enter.   Previous level of function: Indep  Roles and Routines: N/A  Equipment Used at home:  none  Assistance upon Discharge: Pt has assistance from gf upon D/C.     Pain/Comfort:  · Pain Rating 1: 0/10  · Pain Rating Post-Intervention 1: 0/10    Patients cultural, spiritual, Taoism conflicts given the current situation:      Objective:     Communicated with: RN prior to session.  Patient found HOB elevated with telemetry, pulse ox (continuous), peripheral IV upon OT entry to room.    General Precautions: Standard, fall   Orthopedic Precautions:N/A   Braces: N/A     Occupational Performance:    Bed Mobility:    · Patient completed Scooting/Bridging with supervision  · Patient completed Supine to Sit with supervision    Functional Mobility/Transfers:  · Patient completed Sit <> Stand Transfer with supervision  with  no assistive device   · Patient completed Bed <> Chair Transfer using Step Transfer technique with supervision with no assistive device  · Functional Mobility: Pt ambulated ~10 ft at sba w/o AD. Pt limited 2/2 lines.     Activities of Daily Living:  · Lower Body Dressing: supervision donned socks  seated EOB    Cognitive/Visual Perceptual:  Cognitive/Psychosocial Skills:     -       Oriented to: Person, Place, Time and Situation   -       Follows Commands/attention:Follows multistep  commands  -       Communication: clear/fluent  -       Memory: No Deficits noted  -       Safety awareness/insight to disability: intact   -       Mood/Affect/Coping skills/emotional control: Appropriate to situation  Visual/Perceptual:      -Intact      Physical Exam:  Balance:    -       Pt w/ no noted deficits   Postural examination/scapula alignment:    -       Rounded shoulders  Skin integrity: Visible skin intact  Upper Extremity Range of Motion:     -       Right Upper Extremity: WFL  -       Left Upper Extremity: WFL  Upper Extremity Strength:    -       Right Upper Extremity: WFL  -       Left Upper Extremity: WFL   Strength:    -       Right Upper Extremity: WFL  -       Left Upper Extremity: WFL  Fine Motor Coordination:    -       Intact  Gross motor coordination:   WFL    AMPAC 6 Click ADL:  AMPAC Total Score: 24    Treatment & Education:  Pt educated on POC.   Education:    Patient left up in chair with all lines intact, call button in reach and RN notified    GOALS:   Multidisciplinary Problems     Occupational Therapy Goals     Not on file          Multidisciplinary Problems (Resolved)        Problem: Occupational Therapy Goal    Goal Priority Disciplines Outcome Interventions   Occupational Therapy Goal   (Resolved)     OT, PT/OT Outcome(s) achieved    Description:  Pt is not currently displaying a need for acute OT services. D/C acute OT services and recommend pt D/C home.                    History:     Past Medical History:   Diagnosis Date    DVT (deep venous thrombosis)     Hypertension        History reviewed. No pertinent surgical history.    Time Tracking:     OT Date of Treatment: 06/30/19  OT Start Time: 1050  OT Stop Time: 1101  OT Total Time (min): 11 min    Billable Minutes:Evaluation 11  minutes    Keagan Mccormack, OT  6/30/2019

## 2019-06-30 NOTE — ASSESSMENT & PLAN NOTE
73 yr old male with a significant smoking history with multiple non enhancing hemorrhagic brain lesions in b/l frontal, R occipital and R parietal (high suspicion for mets) with vasogenic edema on MRI brain.   CT chest/abd/pelvis with a mass in the right upper lobe and multiple hepatic lesions.    -- Admitted to [facility]  -- NSGY and Hem/Onc following, appreciate recs.  -- Dex 4mg q6  -- SBP<160, continue amlodipine 10mg daily  -- Neuro checks q1  -- PT/OT  -- On a diet  -- Hold off on sc heparin.    Step down to hospital medicine

## 2019-06-30 NOTE — NURSING TRANSFER
Nursing Transfer Note      6/30/2019     Transfer To: 1109 From: 9090    Transfer via wheelchair    Transfer with cardiac monitoring    Transported by RN    Medicines sent: Aspart pen    Chart send with patient: Yes    Notified: spouse at bedside    Patient reassessed at: 6/30/19 at 1730    Upon arrival to floor: patient oriented to room, call bell in reach and bed in lowest position. Marisela RN at bedside. VSS.

## 2019-06-30 NOTE — PLAN OF CARE
Hospital Medicine ICU Acceptance Note    Date of Admit: 6/29/2019  Date of Transfer / Stepdown: 6/30/2019  ICU team stepping patient down: MICU  Accepting  team: [facility]     Brief History of Present Illness:      Jeffery Reyna is a 72 y/o with long history of tobacco use. Now diagnosed with possibly metastatic lung CA. IR to do lung biopsy possibly. On dexamethasone and NS and onc following. DVT in LLE read as chronic decreased from prior. Not sure why patient not on anticoagulation initially but cannot be anticoagulated now since he has hemorrhagic brain mets.     Consultants:  Onc and NS        Patient has been accepted by Hospital Medicine Team IMG, who will assume care of the patient upon arrival to the floor from the ICU. Please contact ICU team with any concerns prior to arrival. Please contact Hospital Medicine at [phone] or [phone] (please do NOT leave a voicemail) when patient arrives to the floor.

## 2019-06-30 NOTE — CONSULTS
"  [facility]  Adult Nutrition  Consult Note    SUMMARY     Recommendations    Recommendation/Intervention:   Continue Regular diet. Pt declines Boost ONS at this time.     RD to monitor.    Goals: Pt to tolerate >85% EEN and EPN by RD follow up  Nutrition Goal Status: new  Communication of RD Recs: reviewed with RN    Reason for Assessment    Reason For Assessment: consult  Diagnosis: other (see comments)(brain mets)  Relevant Medical History: DVT, HTN, lung cancer  Interdisciplinary Rounds: attended  General Information Comments: Pt's diet advanced. Awaiting lunch tray this afternoon. Pt reports adequate po intake PTA, wife endorses small weight loss with weight gain is pt's usual. Per chart review, pt approx 200lb. Pt does not drink Boost or Ensure. Pt appears nourished, no indications of malnutrition at this time.  Nutrition Discharge Planning: adequate po intake for optimal nutrition    Nutrition Risk Screen    Nutrition Risk Screen: no indicators present    Nutrition/Diet History    Spiritual, Cultural Beliefs, Confucianism Practices, Values that Affect Care: no  Factors Affecting Nutritional Intake: None identified at this time    Anthropometrics    Temp: 98.1 °F (36.7 °C)  Height Method: Stated  Height: 5' 10" (177.8 cm)  Height (inches): 70 in  Weight Method: Bed Scale  Weight: 87.5 kg (193 lb)  Weight (lb): 193 lb  Ideal Body Weight (IBW), Male: 166 lb  % Ideal Body Weight, Male (lb): 116.27 lb  BMI (Calculated): 27.8  BMI Grade: 25 - 29.9 - overweight       Lab/Procedures/Meds    Pertinent Labs Reviewed: reviewed  Pertinent Labs Comments: HgbA1c 5.9  Pertinent Medications Reviewed: reviewed  Pertinent Medications Comments: folic acid, thiamine, IVF    Physical Findings/Assessment         Estimated/Assessed Needs    Weight Used For Calorie Calculations: 87.5 kg (192 lb 14.4 oz)  Energy Calorie Requirements (kcal): 6343-5680  Energy Need Method: Kcal/kg(30-35kcal/kg)  Protein Requirements: " 114-132g(1.3-1.5g/kg)  Weight Used For Protein Calculations: 87.5 kg (192 lb 14.4 oz)  Fluid Requirements (mL): 1mL/kcal or per MD     RDA Method (mL): 2625         Nutrition Prescription Ordered    Current Diet Order: Regular    Evaluation of Received Nutrient/Fluid Intake    IV Fluid (mL): 1200  I/O: +I/O, LBM 6/28    Nutrition Risk    Level of Risk/Frequency of Follow-up: (f/u 1x/week)     Assessment and Plan    No nutrition diagnosis at this time.       Monitor and Evaluation    Food and Nutrient Intake: energy intake, food and beverage intake  Food and Nutrient Adminstration: diet order  Anthropometric Measurements: weight, weight change, body mass index  Biochemical Data, Medical Tests and Procedures: electrolyte and renal panel, gastrointestinal profile, inflammatory profile, lipid profile, glucose/endocrine profile  Nutrition-Focused Physical Findings: overall appearance       Nutrition Follow-Up    RD Follow-up?: Yes

## 2019-06-30 NOTE — PT/OT/SLP EVAL
"Physical Therapy Evaluation and Discharge Note    Patient Name:  Jeffery Reyna   MRN:  [id number]    Recommendations:     Discharge Recommendations:  home   Discharge Equipment Recommendations: none   Barriers to discharge: None    Assessment:     Jeffery Reyna is a 73 y.o. male admitted with a medical diagnosis of Brain metastasis. .  At this time, patient is functioning at their prior level of function and does not require further acute PT services.     Recent Surgery: * No surgery found *      Plan:     During this hospitalization, patient does not require further acute PT services.  Please re-consult if situation changes.      Subjective     Chief Complaint: none noted   Patient/Family Comments/goals: "I'm good I'm just lazy"  Pain/Comfort:  ·      Patients cultural, spiritual, Christianity conflicts given the current situation: no    Living Environment:  Pt lives with wife in [facility] with threshold to enter.   Prior to admission, patients level of function was Ind.  Equipment used at home: none.  DME owned (not currently used): none.  Upon discharge, patient will have assistance from wife.    Objective:     Communicated with RN prior to session.  Patient found supine with (lines intact ) upon PT entry to room.    General Precautions: Standard,     Orthopedic Precautions:N/A   Braces: N/A     Exams:  · Cognitive Exam:  Patient is oriented to Person, Place, Time and Situation  · Gross Motor Coordination:  WFL  · RLE ROM: WFL  · RLE Strength: WFL  · LLE ROM: WFL  · LLE Strength: WFL    Functional Mobility:  · Bed Mobility:     · Rolling Right: modified independence  · Scooting: modified independence  · Supine to Sit: modified independence  · Transfers:     · Sit to Stand:  modified independence with no AD  · Gait: 12ft within room Mod I without AD demonstrating multiple turns without LOB  · Balance: Mod I     AM-PAC 6 CLICK MOBILITY  Total Score:24       Therapeutic Activities and Exercises:   - Pt educated on:   -PT roles, " expectations, and POC    -Safety with mobility   -Benefits of OOB activities to increase strength and functional mobility    -Performing ther ex for increasing LE ROM and strength   -Discharge recommendations     AM-PAC 6 CLICK MOBILITY  Total Score:24     Patient left up in chair with call button in reach.    GOALS:   Multidisciplinary Problems     Physical Therapy Goals     Not on file          Multidisciplinary Problems (Resolved)        Problem: Physical Therapy Goal    Goal Priority Disciplines Outcome Goal Variances Interventions   Physical Therapy Goal   (Resolved)     PT, PT/OT Outcome(s) achieved                     History:     Past Medical History:   Diagnosis Date    DVT (deep venous thrombosis)     Hypertension        History reviewed. No pertinent surgical history.    Time Tracking:     PT Received On: 06/30/19  PT Start Time: 1051     PT Stop Time: 1101  PT Total Time (min): 10 min     Billable Minutes: Evaluation 10      Miguel Ángel Rico, PT  06/30/2019

## 2019-06-30 NOTE — SUBJECTIVE & OBJECTIVE
Interval History: Pulmonology consulted for lung biopsy, recommended IR for liver biopsy.  IR consulted. Step down to hospital medicine.    Review of Systems   Constitutional: Negative for fever.   HENT: Negative for trouble swallowing.    Respiratory: Negative for shortness of breath.    Cardiovascular: Negative for chest pain.   Gastrointestinal: Positive for nausea.   Genitourinary: Negative for difficulty urinating.   Neurological: Positive for headaches. Negative for facial asymmetry, speech difficulty, weakness and numbness.   Psychiatric/Behavioral: Negative for agitation and confusion.       Objective:     Vitals:  Temp: 98.1 °F (36.7 °C)  Pulse: (!) 53  Rhythm: (P) sinus bradycardia  BP: 126/77  MAP (mmHg): 97  Resp: 16  SpO2: (!) 93 %  O2 Device (Oxygen Therapy): room air    Temp  Min: 98.1 °F (36.7 °C)  Max: 98.4 °F (36.9 °C)  Pulse  Min: 53  Max: 86  BP  Min: 115/83  Max: 158/81  MAP (mmHg)  Min: 88  Max: 118  Resp  Min: 10  Max: 21  SpO2  Min: 92 %  Max: 99 %    06/29 0701 - 06/30 0700  In: 2500 [P.O.:100; I.V.:1400]  Out: 850 [Urine:850]   Unmeasured Output  Urine Occurrence: 1  Stool Occurrence: 0  Emesis Occurrence: 1       Physical Exam   Constitutional: No distress.   Eyes: EOM are normal.   Cardiovascular: Normal rate.   Pulmonary/Chest: Effort normal.   Neurological: He is alert. GCS eye subscore is 4. GCS verbal subscore is 5. GCS motor subscore is 6.   Intubated: No  Sedation: No  Mental Status: Alert and oriented x3. Follows commands. Speech normal.  Brainstem: Intact  CN: II-XII intact  Motor: Moves all four extremities spontaneously and antigravity.  Sensory: Normal  Proprioception: Not tested  Coordination: Not tested  Gait: Not tested.       Nursing note and vitals reviewed.      Medications:  Continuous Scheduled  amLODIPine 10 mg Daily   dexamethasone 4 mg Q6H   famotidine 20 mg Daily   folic acid 1 mg Daily   senna-docusate 8.6-50 mg 1 tablet BID   thiamine 100 mg Daily    PRN  acetaminophen 650 mg Q6H PRN   Dextrose 10% Bolus 12.5 g PRN   Dextrose 10% Bolus 25 g PRN   glucagon (human recombinant) 1 mg PRN   glucose 16 g PRN   glucose 24 g PRN   hydrALAZINE 10 mg Q6H PRN   insulin aspart U-100 1-10 Units QID (AC + HS) PRN   magnesium oxide 800 mg PRN   magnesium oxide 800 mg PRN   ondansetron 4 mg Q8H PRN   oxyCODONE 5 mg Q6H PRN   potassium chloride 10% 40 mEq PRN   potassium chloride 10% 40 mEq PRN   potassium chloride 10% 40 mEq PRN   potassium, sodium phosphates 2 packet PRN   potassium, sodium phosphates 2 packet PRN   potassium, sodium phosphates 2 packet PRN   sodium chloride 0.9% 10 mL PRN     Today I personally reviewed pertinent medications, lines/drains/airways, imaging, cardiology results, laboratory results, notably:    Diet  Diet Adult Regular (IDDSI Level 7)  Diet Adult Regular (IDDSI Level 7)

## 2019-06-30 NOTE — HPI
72 Y/O AA man - long time smoker admitted with new onset neuro SX and multiple brain lesions.    He reports recent onset of gait disturbance and non-focal weakness as well as headache and nausea with vomiting.    MRI demonstrates multiple brain lesions, the largest in the right frontal area with some evidence of hemmorrhage.    CT Chest/abd shows a RUL mass measuring 5.8 cm and multiple hepatic lesions.

## 2019-06-30 NOTE — PLAN OF CARE
Problem: Adult Inpatient Plan of Care  Goal: Plan of Care Review  Outcome: Ongoing (interventions implemented as appropriate)  POC reviewed with pt at 040. Pt verbalized understanding. Questions and concerns addressed. No acute events overnight. Pt progressing toward goals. Will continue to monitor. See flowsheets for full assessment and VS info

## 2019-06-30 NOTE — PT/OT/SLP EVAL
Speech Language Pathology Evaluation  Bedside Swallow    Patient Name:  Jeffery Reyna   MRN:  [id number]  Admitting Diagnosis: Brain metastasis    Recommendations:                 General Recommendations:   Dysphagia therapy and Cognitive-linguistic evaluation, possible modified barium swallow study might be warranted pending progress  Diet recommendations:  Regular, Thin   Aspiration Precautions: 1 bite/sip at a time, Avoid talking while eating, Eliminate distractions, Feed only when awake/alert, Frequent oral care, HOB to 90 degrees, Meds whole 1 at a time, Remain upright 30 minutes post meal and Strict aspiration precautions   Continue to monitor for signs and symptoms of aspiration and discontinue oral feeding should you notice any of the following: watery eyes, reddened facial area, wet vocal quality, increased work of breathing, change in respiratory status, increased congestion, coughing, fever, etc.  General Precautions: Standard, aspiration, fall, hearing impaired  Communication strategies:  go to room if call light pushed    History:     Past Medical History:   Diagnosis Date    DVT (deep venous thrombosis)     Hypertension        History reviewed. No pertinent surgical history.    Social History: Patient lives with Spouse in home in [place], [place].    Prior Intubation HX:  None this admission     Chest X-Rays: 6/29/2019: Patchy consolidative opacity projected over the right midlung zone, likely within the right upper lobe inferiorly, findings may reflect sequela of infection, correlation recommended.  Follow-up is advised to exclude malignancy.  Comparison with any more recent exams would be helpful.    Prior diet: regular, thin    Occupation/hobbies/homemaking: Pt reports he is a retired , and further explains he enjoys cutting the grass in his free time.    Subjective     SLP reviewed Pt with RN, RN reported no difficulty noted with pills taken whole or current, diet trays for regular diet  "with thin liquids  Pt presents calm  He explains, "If I am not hungry for it, I can't eat it. I have to have little snacks"  He denies pain or difficulty with swallow, endorses minimal appetite     Pain/Comfort:  · Pain Rating 1: 0/10    Objective:   He was found awake in bed with peripheral IVs, continuous pulse ox and blood pressure monitor in place. HOB elevated.     Oral Musculature Evaluation  · Dentition: upper and lower dentures, uses dentures to eat  · Secretion Management: adequate  · Mucosal Quality: adequate  · Mandibular Strength and Mobility: WNL  · Oral Labial Strength and Mobility: WNL  · Volitional Cough: elicited on command  · Volitional Swallow: elicited, timely  · Voice Prior to PO Intake: clear, mildly strained     Bedside Swallow Eval:   Consistencies Assessed:  · Thin liquids straw sips x3  · Puree tsp bites self-fed x8     Oral Phase:   · WNL    Pharyngeal Phase:   · no overt clinical signs/symptoms of aspiration    Compensatory Strategies  · None    Treatment: No overt S/S aspiration with trials of puree or thin liquids. Pt politely declined offerings of advanced textures this service day 2/2 minimal appetite. He endorsed decreased appetite for several weeks. He was educated on SLP role, S/S aspiration, aspiration precautions and ongoing SLP POC. No  family present. He verbalize understanding. No questions noted. White board updated. Findings/recs reviewed with RN. Pt remained partially elevated in bed with call light in reach upon SLP exit from room.     Assessment:     Jeffery Reyna is a 73 y.o. male with an SLP diagnosis of risk for aspiration.  He presents with minimal appetite. No overt S/S aspiration with trials of puree or thin liquids accepted this service day; however, risk of aspiration remains 2/2 lethargy and attention.  Pending progress, he might require further, objective assessment via MBSS.  Please continue to monitor for signs and symptoms of aspiration and discontinue oral " feeding should you notice any of the following: watery eyes, reddened facial area, wet vocal quality, increased work of breathing, change in respiratory status, increased congestion, coughing, fever, etc.      Goals:   Multidisciplinary Problems     SLP Goals        Problem: SLP Goal    Goal Priority Disciplines Outcome   SLP Goal     SLP Ongoing (interventions implemented as appropriate)   Description:  Speech Language Pathology Goals  Goals expected to be met by 7/7/2019  1. Pt will tolerate regular diet with thin liquids w/o overt S/S aspiration, MOD I  2. Pt will participate in speech, language and cognitive assessment  3. Educate Pt and family on S/S aspiration and safe swallow strategies                       Plan:     · Patient to be seen:  4 x/week   · Plan of Care expires:  07/30/19  · Plan of Care reviewed with:      · SLP Follow-Up:  Yes       Discharge recommendations:  other (see comments), home health speech therapy(pending progress and PT/OT recs)     Time Tracking:     SLP Treatment Date:   06/30/19  Speech Start Time:  1546  Speech Stop Time:  1555     Speech Total Time (min):  9 min    Billable Minutes: Eval Swallow and Oral Function 9    DON Yung, CCC-SLP  Speech-Language Pathology  Pager: [phone]      06/30/2019

## 2019-06-30 NOTE — ASSESSMENT & PLAN NOTE
Findings consistent with primary lung cancer with brain and liver metastasis.    He will need a biopsy of the lung lesion or a liver lesion (IR preference) to determine histology and obtain tissue for special studies to guide therapy.  Rad Onc consult should be obtained.    I discussed the likely diagnosis and the incurable nature of that with the patient. He voiced understanding.  Palliative care consult would likely be helpful as well.

## 2019-06-30 NOTE — CONSULTS
Inpatient consult to Physical Medicine Rehab  Consult performed by: Charlette Lino NP  Consult ordered by: Krystyna Campo NP  Reason for consult: assess rehab needs        Reviewed patient history and current admission.  Rehab team following.  Full consult to follow.    AMY Ocampo, FNP-C  Physical Medicine & Rehabilitation   06/30/2019

## 2019-06-30 NOTE — PLAN OF CARE
Problem: SLP Goal  Goal: SLP Goal  Speech Language Pathology Goals  Goals expected to be met by 7/7/2019  1. Pt will tolerate regular diet with thin liquids w/o overt S/S aspiration, MOD I  2. Pt will participate in speech, language and cognitive assessment  3. Educate Pt and family on S/S aspiration and safe swallow strategies     Outcome: Ongoing (interventions implemented as appropriate)  SLP Bedside Swallow Assessment initiated. Patient with minimal PO acceptance. No overt S/S aspiration on trials of thin liquids or puree accepted. Recommend continue current diet and ST to continue to follow for ongoing monitoring. Please continue to monitor for signs and symptoms of aspiration and discontinue oral feeding should you notice any of the following: watery eyes, reddened facial area, wet vocal quality, increased work of breathing, change in respiratory status, increased congestion, coughing, fever, etc.      JULIO Yung., [facility]SLP  Speech-Language Pathology  Pager: [phone]  6/30/2019

## 2019-06-30 NOTE — CONSULTS
"[facility]  Pulmonology  Consult Note    Patient Name: Jeffery Reyna  MRN: [id number]  Admission Date: 6/29/2019  Hospital Length of Stay: 1 days  Code Status: Full Code  Attending Physician: Ksenia Patten MD  Primary Care Provider: Flori Harmon MD   Principal Problem: Brain metastasis    Inpatient consult to Pulmonology  Consult performed by: Melissa Mckenna MD  Consult ordered by: Khris Ballesteros MD        Subjective:     HPI:  Mr. Reyna is a 72 yo M with a PMH of HTN, DM2, alcohol use disorder, tobacco use disorder, and DVT who was transferred to [facility] from [facility] on 6/29 for headaches. Patient was found to have multiple hyperdense/hemorrhagic masses present within the bilateral cerebral hemispheres with surrounding vasogenic edema.    Patient was admitted to [facility] - NSGY stated that there were no acute interventions necessary. A CXR was done on admission which showed RUL opacity. Follow up CT chest/abd/pel showed large RUL spiculated mass with multiple enhancing liver lesions.    Patient states that he has been having chills at home for some time, but otherwise denies fevers, chest pain, SOB, cough, sputum production, abdominal pain, N/V/D. He has a 100 pack year smoking history (2 PPD for 50 years). He has slight weight loss, but no appetite change.    Past Medical History:   Diagnosis Date    DVT (deep venous thrombosis)     Hypertension      History reviewed. No pertinent surgical history.    Review of patient's allergies indicates:  No Known Allergies    Family History     Problem Relation (Age of Onset)    Cancer Mother    Diabetes Mother    Hypertension Sister    Thyroid disease Mother        Tobacco Use    Smoking status: Current Every Day Smoker     Packs/day: 1.00     Types: Cigarettes    Smokeless tobacco: Never Used   Substance and Sexual Activity    Alcohol use: Yes     Comment: daily "6 beers"    Drug use: No    Sexual activity: Not on file       Review of " Systems   Constitutional: Positive for chills and unexpected weight change. Negative for fever.   HENT: Negative for congestion, postnasal drip, rhinorrhea and sinus pain.    Eyes: Negative for pain and itching.   Respiratory: Negative for cough, shortness of breath and wheezing.    Cardiovascular: Negative for chest pain and leg swelling.   Gastrointestinal: Negative for abdominal pain, diarrhea and nausea.   Genitourinary: Negative for difficulty urinating and frequency.   Musculoskeletal: Negative for arthralgias and myalgias.   Neurological: Negative for dizziness and light-headedness.     Objective:     Vital Signs (Most Recent):  Temp: 98 °F (36.7 °C) (06/30/19 1502)  Pulse: 60 (06/30/19 1502)  Resp: 20 (06/30/19 1502)  BP: 135/74 (06/30/19 1502)  SpO2: (!) 94 % (06/30/19 1502) Vital Signs (24h Range):  Temp:  [98 °F (36.7 °C)-98.4 °F (36.9 °C)] 98 °F (36.7 °C)  Pulse:  [53-86] 60  Resp:  [10-21] 20  SpO2:  [92 %-98 %] 94 %  BP: (123-158)/(65-86) 135/74     Weight: 87.5 kg (193 lb)  Body mass index is 27.69 kg/m².    Intake/Output Summary (Last 24 hours) at 6/30/2019 1553  Last data filed at 6/30/2019 1402  Gross per 24 hour   Intake 2057.5 ml   Output 1850 ml   Net 207.5 ml     Physical Exam   Constitutional: He is oriented to person, place, and time. He appears well-developed and well-nourished. No distress.   HENT:   Head: Normocephalic and atraumatic.   Nose: Nose normal.   Mouth/Throat: Oropharynx is clear and moist. No oropharyngeal exudate.   Eyes: Pupils are equal, round, and reactive to light. Conjunctivae are normal.   Neck: Normal range of motion. Neck supple.   Cardiovascular: Normal rate, regular rhythm and normal heart sounds.   Pulmonary/Chest: Effort normal and breath sounds normal. He has no wheezes. He has no rales.   +crackles anteriorly on R side   Abdominal: Soft. He exhibits distension. There is no tenderness.   Musculoskeletal: Normal range of motion. He exhibits no edema.    Neurological: He is alert and oriented to person, place, and time.   Skin: Skin is warm and dry. He is not diaphoretic.   Psychiatric: He has a normal mood and affect. His behavior is normal.   Nursing note and vitals reviewed.    Lines/Drains/Airways     Peripheral Intravenous Line                 Peripheral IV - Single Lumen 06/29/19 1032 20 G Left Hand 1 day         Peripheral IV - Single Lumen 06/29/19 1402 20 G Left Wrist 1 day              Significant Labs:    CBC/Anemia Profile:  Recent Labs   Lab 06/29/19  1135 06/30/19  0139   WBC 10.65 10.86   HGB 14.8 15.2   HCT 44.3 45.0   * 460*   MCV 87 85   RDW 15.1* 15.5*     Chemistries:  Recent Labs   Lab 06/29/19  1135 06/29/19  1757 06/30/19  0229     --  136   K 4.4  --  4.2     --  105   CO2 24  --  18*   BUN 10  --  10   CREATININE 1.0  --  0.8   CALCIUM 10.5  --  9.8   ALBUMIN 4.4 4.1 3.9   PROT 8.4 8.1 7.7   BILITOT 1.1* 1.2* 1.1*   ALKPHOS 100 102 97   ALT 12 14 13   AST 18 17 19   MG 2.0  --  1.7   PHOS  --   --  3.0     All pertinent labs within the past 24 hours have been reviewed.    Significant Imaging:   I have reviewed and interpreted all pertinent imaging results/findings within the past 24 hours.    Assessment/Plan:     Mass of upper lobe of right lung  This is a case of RUL mass (5.8 x 4.2 x 3.6 cm) in the setting of 100 pack year smoking history with metastases to the liver and brain. Pulmonology was consulted for biopsy. Given patient's clinical condition and vasogenic brain edema, bronchoscopy with conscious sedation would not be optimal.    -Consult interventional radiology for biopsy of liver met vs lung mass.  -Heme/onc already on board.    Thank you for involving us in the care of this patient. We will sign off. Please call with any questions.    Melissa Mckenna MD  [facility] Fellow, PGY 5  [facility]  Pager: [phone]

## 2019-06-30 NOTE — RESIDENT HANDOFF
Handoff     Primary Team: Networked reference to record [facility]  Room Number: 9090/9090 A     Patient Name: Jeffery Reyna MRN: [id number]     Date of Birth: [id number] Allergies: Patient has no known allergies.     Age: 73 y.o. Admit Date: 6/29/2019     Sex: male  BMI: Body mass index is 27.69 kg/m².     Code Status: Full Code        Illness Level (current clinical status): Watcher - No    Reason for Admission: Brain metastasis    Brief HPI (pertinent PMH and diagnosis or differential diagnosis):     Patient is a 73 yr old male with a PMH of HTN and DVT who was transferred from an OSH after being found to have multiple hemorrhagic lesions on CTH. Per patient he has been having a HA x 3 days with associated nausea and vomiting. MRI Brain w and wo shows several nonenhancing hemorrhagic lesions in the right occipital lobe, right frontal and parietal lobes, and left frontal lobe with surrounding vasogenic edema. Patient denies history of CA, positive for a history of smoking and ETOH use (6 beers/day). Neurosurgery consulted. Patient started on Dexamethasone. CT Chest/abdomen/pelvis w/wo pending. Will admit to [facility] for close monitoring.    Procedure Date: None    Hospital Course (updated, brief assessment by system or problem, significant events):   Patient was admitted to [facility] for close monitoring. CT of the chest/abd and pelvis showed a right lung mass concerning for malignancy and multiple enhancing hepatic lesions. Hem/Onc consulted. Spoke with pulmonology about a biopsy and they recommended an IR consult for liver biopsy.    Neurologically he is intact. Stepped down to hospital medicine.    Tasks (specific, using if-then statements):   -- Spoke with IR. F/u recs.  -- Continue Dex. F/u NSGY recs.  -- F/u Hem/Onc recs. May need palliative consult.  -- Has a history of DVT, not on AC. US showed decreased clot burden in the LLE compared to prior in 2014. Holding off on AC given his hemorrhagic brain lesions.    Contingency Plan  (special circumstances anticipated and plan):   -- If any decline in mental status, reconsult Neuro ICU.    Estimated Discharge Date: Per hospital medicine team.    Discharge Disposition: Per PT/OT    Mentored By: Ksenia Patten MD

## 2019-06-30 NOTE — PLAN OF CARE
Problem: Physical Therapy Goal  Goal: Physical Therapy Goal  Outcome: Outcome(s) achieved Date Met: 06/30/19  No goals set, pt does not require additional acute PT services at this time.     Pt educated on asking medical staff for PT consult if changes in functional status occurs.     - Jose Luis Rico, PT, DPT  6/30/2019

## 2019-06-30 NOTE — PLAN OF CARE
Problem: Occupational Therapy Goal  Goal: Occupational Therapy Goal  Pt is not currently displaying a need for acute OT services. D/C acute OT services and recommend pt D/C home.  Outcome: Outcome(s) achieved Date Met: 06/30/19  D/C acute OT services     Comments: Keagan Mccormack OTR/L  6/30/2019

## 2019-06-30 NOTE — CONSULTS
"[facility]  Hematology/Oncology  Consult Note    Patient Name: Jeffery Reyna  MRN: [id number]  Admission Date: 6/29/2019  Hospital Length of Stay: 1 days  Code Status: Full Code   Attending Provider: Ksenia Patten MD  Consulting Provider: Hosea Schmid MD  Primary Care Physician: Flori Harmon MD  Principal Problem:Brain metastasis    Consults  Subjective:     HPI:  74 Y/O AA man - long time smoker admitted with new onset neuro SX and multiple brain lesions.    He reports recent onset of gait disturbance and non-focal weakness as well as headache and nausea with vomiting.    MRI demonstrates multiple brain lesions, the largest in the right frontal area with some evidence of hemmorrhage.    CT Chest/abd shows a RUL mass measuring 5.8 cm and multiple hepatic lesions.    Oncology Treatment Plan:   [No treatment plan]    Medications:  Continuous Infusions:   lactated ringers 50 mL/hr at 06/30/19 0802     Scheduled Meds:   amLODIPine  10 mg Oral Daily    dexamethasone  4 mg Intravenous Q6H    folic acid  1 mg Oral Daily    senna-docusate 8.6-50 mg  1 tablet Oral BID    thiamine  100 mg Oral Daily     PRN Meds:acetaminophen, hydrALAZINE, ondansetron, oxyCODONE, sodium chloride 0.9%     Review of patient's allergies indicates:  No Known Allergies     Past Medical History:   Diagnosis Date    DVT (deep venous thrombosis)     Hypertension      History reviewed. No pertinent surgical history.  Family History     Problem Relation (Age of Onset)    Cancer Mother    Diabetes Mother    Hypertension Sister    Thyroid disease Mother        Tobacco Use    Smoking status: Current Every Day Smoker     Packs/day: 1.00     Types: Cigarettes    Smokeless tobacco: Never Used   Substance and Sexual Activity    Alcohol use: Yes     Comment: daily "6 beers"    Drug use: No    Sexual activity: Not on file       Review of Systems   Constitutional: Positive for activity change. Negative for appetite change, fever " and unexpected weight change.   HENT: Negative for trouble swallowing.    Respiratory: Negative for cough and shortness of breath.    Cardiovascular: Negative for chest pain.   Gastrointestinal: Positive for nausea and vomiting. Negative for abdominal pain.   Musculoskeletal: Negative for back pain.   Neurological: Positive for weakness and headaches.   Psychiatric/Behavioral: Negative for dysphoric mood. The patient is not nervous/anxious.      Objective:     Vital Signs (Most Recent):  Temp: 98.1 °F (36.7 °C) (06/30/19 0702)  Pulse: 61 (06/30/19 0802)  Resp: 14 (06/30/19 0802)  BP: (!) 158/81 (06/30/19 0802)  SpO2: 95 % (06/30/19 0802) Vital Signs (24h Range):  Temp:  [97.7 °F (36.5 °C)-98.5 °F (36.9 °C)] 98.1 °F (36.7 °C)  Pulse:  [55-96] 61  Resp:  [10-22] 14  SpO2:  [92 %-100 %] 95 %  BP: (115-173)/(65-96) 158/81     Weight: 87.9 kg (193 lb 12.6 oz)  Body mass index is 27.81 kg/m².  Body surface area is 2.08 meters squared.      Intake/Output Summary (Last 24 hours) at 6/30/2019 0829  Last data filed at 6/30/2019 0802  Gross per 24 hour   Intake 2597.5 ml   Output 850 ml   Net 1747.5 ml       Physical Exam   Constitutional: He is oriented to person, place, and time. He appears well-developed and well-nourished.   Eyes: No scleral icterus.   Cardiovascular: Normal rate and regular rhythm.   Pulmonary/Chest: Effort normal and breath sounds normal. He has no wheezes.   Abdominal: Soft. He exhibits no mass. There is no tenderness.   Lymphadenopathy:     He has no cervical adenopathy.     He has no axillary adenopathy.        Right: No supraclavicular adenopathy present.        Left: No supraclavicular adenopathy present.   Neurological: He is alert and oriented to person, place, and time. No cranial nerve deficit.   Psychiatric: He has a normal mood and affect. His behavior is normal. Thought content normal.   Vitals reviewed.      Significant Labs:   All pertinent labs from the last 24 hours have been  reviewed.    Diagnostic Results:  I have reviewed all pertinent imaging results/findings within the past 24 hours.    Assessment/Plan:     * Brain metastasis  Findings consistent with primary lung cancer with brain and liver metastasis.    He will need a biopsy of the lung lesion or a liver lesion (IR preference) to determine histology and obtain tissue for special studies to guide therapy.  Rad Onc consult should be obtained.    I discussed the likely diagnosis and the incurable nature of that with the patient. He voiced understanding.  Palliative care consult would likely be helpful as well.        Thank you for your consult. I will follow-up with patient. Please contact us if you have any additional questions.    Hosea Schmid MD  Hematology/Oncology  [facility]

## 2019-06-30 NOTE — SUBJECTIVE & OBJECTIVE
"Oncology Treatment Plan:   [No treatment plan]    Medications:  Continuous Infusions:   lactated ringers 50 mL/hr at 06/30/19 0802     Scheduled Meds:   amLODIPine  10 mg Oral Daily    dexamethasone  4 mg Intravenous Q6H    folic acid  1 mg Oral Daily    senna-docusate 8.6-50 mg  1 tablet Oral BID    thiamine  100 mg Oral Daily     PRN Meds:acetaminophen, hydrALAZINE, ondansetron, oxyCODONE, sodium chloride 0.9%     Review of patient's allergies indicates:  No Known Allergies     Past Medical History:   Diagnosis Date    DVT (deep venous thrombosis)     Hypertension      History reviewed. No pertinent surgical history.  Family History     Problem Relation (Age of Onset)    Cancer Mother    Diabetes Mother    Hypertension Sister    Thyroid disease Mother        Tobacco Use    Smoking status: Current Every Day Smoker     Packs/day: 1.00     Types: Cigarettes    Smokeless tobacco: Never Used   Substance and Sexual Activity    Alcohol use: Yes     Comment: daily "6 beers"    Drug use: No    Sexual activity: Not on file       Review of Systems   Constitutional: Positive for activity change. Negative for appetite change, fever and unexpected weight change.   HENT: Negative for trouble swallowing.    Respiratory: Negative for cough and shortness of breath.    Cardiovascular: Negative for chest pain.   Gastrointestinal: Positive for nausea and vomiting. Negative for abdominal pain.   Musculoskeletal: Negative for back pain.   Neurological: Positive for weakness and headaches.   Psychiatric/Behavioral: Negative for dysphoric mood. The patient is not nervous/anxious.      Objective:     Vital Signs (Most Recent):  Temp: 98.1 °F (36.7 °C) (06/30/19 0702)  Pulse: 61 (06/30/19 0802)  Resp: 14 (06/30/19 0802)  BP: (!) 158/81 (06/30/19 0802)  SpO2: 95 % (06/30/19 0802) Vital Signs (24h Range):  Temp:  [97.7 °F (36.5 °C)-98.5 °F (36.9 °C)] 98.1 °F (36.7 °C)  Pulse:  [55-96] 61  Resp:  [10-22] 14  SpO2:  [92 %-100 %] 95 " %  BP: (115-173)/(65-96) 158/81     Weight: 87.9 kg (193 lb 12.6 oz)  Body mass index is 27.81 kg/m².  Body surface area is 2.08 meters squared.      Intake/Output Summary (Last 24 hours) at 6/30/2019 0829  Last data filed at 6/30/2019 0802  Gross per 24 hour   Intake 2597.5 ml   Output 850 ml   Net 1747.5 ml       Physical Exam   Constitutional: He is oriented to person, place, and time. He appears well-developed and well-nourished.   Eyes: No scleral icterus.   Cardiovascular: Normal rate and regular rhythm.   Pulmonary/Chest: Effort normal and breath sounds normal. He has no wheezes.   Abdominal: Soft. He exhibits no mass. There is no tenderness.   Lymphadenopathy:     He has no cervical adenopathy.     He has no axillary adenopathy.        Right: No supraclavicular adenopathy present.        Left: No supraclavicular adenopathy present.   Neurological: He is alert and oriented to person, place, and time. No cranial nerve deficit.   Psychiatric: He has a normal mood and affect. His behavior is normal. Thought content normal.   Vitals reviewed.      Significant Labs:   All pertinent labs from the last 24 hours have been reviewed.    Diagnostic Results:  I have reviewed all pertinent imaging results/findings within the past 24 hours.

## 2019-06-30 NOTE — CONSULTS
Radiology Consult    Jeffery Reyna is a 73 y.o. male with a history of HTN and DVT who was transferred from an OSH after being found to have multiple hemorrhagic lesions on CTH. Per patient he has been having a HA x 3 days with associated nausea and vomiting. MRI Brain w and wo shows several nonenhancing hemorrhagic lesions in the right occipital lobe, right frontal and parietal lobes, and left frontal lobe with surrounding vasogenic edema. Multiple enhancing lesions are seen on imaging. Interventional Radiology has been consulted for liver biopsy.    Past Medical History:   Diagnosis Date    DVT (deep venous thrombosis)     Hypertension      History reviewed. No pertinent surgical history.    Imaging reviewed with Radiology staff, Dr. Shah.       Scheduled Meds:    amLODIPine  10 mg Oral Daily    dexamethasone  4 mg Intravenous Q6H    famotidine  20 mg Oral Daily    folic acid  1 mg Oral Daily    senna-docusate 8.6-50 mg  1 tablet Oral BID    thiamine  100 mg Oral Daily     Continuous Infusions:   PRN Meds:acetaminophen, Dextrose 10% Bolus, Dextrose 10% Bolus, glucagon (human recombinant), glucose, glucose, hydrALAZINE, insulin aspart U-100, magnesium oxide, magnesium oxide, ondansetron, oxyCODONE, potassium chloride 10%, potassium chloride 10%, potassium chloride 10%, potassium, sodium phosphates, potassium, sodium phosphates, potassium, sodium phosphates, sodium chloride 0.9%    Allergies: Review of patient's allergies indicates:  No Known Allergies    Labs:  Recent Labs   Lab 06/29/19  1135   INR 1.1       Recent Labs   Lab 06/30/19  0139   WBC 10.86   HGB 15.2   HCT 45.0   MCV 85   *      Recent Labs   Lab 06/29/19  1757 06/30/19  0229   GLU  --  138*   NA  --  136   K  --  4.2   CL  --  105   CO2  --  18*   BUN  --  10   CREATININE  --  0.8   CALCIUM  --  9.8   MG  --  1.7   ALT 14 13   AST 17 19   ALBUMIN 4.1 3.9   BILITOT 1.2* 1.1*   BILIDIR 0.5*  --          Vitals (Most Recent):  Temp: 98  °F (36.7 °C) (06/30/19 1502)  Pulse: 60 (06/30/19 1502)  Resp: 20 (06/30/19 1502)  BP: 135/74 (06/30/19 1502)  SpO2: (!) 94 % (06/30/19 1502)    Plan:   1. Chart and imaging reviewed and discussed with attending.  Recommend biopsy as an outpatient in the upcoming week. Thank you for your consult. Please call with any questions or concerns.     Scar Trinidad M.D.   PGY-2  Radiology

## 2019-06-30 NOTE — HPI
Mr. Reyna is a 72 yo M with a PMH of HTN, DM2, alcohol use disorder, tobacco use disorder, and DVT who was transferred to [facility] from [facility] on 6/29 for headaches. Patient was found to have multiple hyperdense/hemorrhagic masses present within the bilateral cerebral hemispheres with surrounding vasogenic edema.    Patient was admitted to [facility] - NSGY stated that there were no acute interventions necessary. A CXR was done on admission which showed RUL opacity. Follow up CT chest/abd/pel showed large RUL spiculated mass with multiple enhancing liver lesions.    Patient states that he has been having chills at home for some time, but otherwise denies fevers, chest pain, SOB, cough, sputum production, abdominal pain, N/V/D. He has a 100 pack year smoking history (2 PPD for 50 years). He has slight weight loss, but no appetite change.

## 2019-06-30 NOTE — ASSESSMENT & PLAN NOTE
This is a case of RUL mass (5.8 x 4.2 x 3.6 cm) in the setting of 100 pack year smoking history with metastases to the liver and brain. Pulmonology was consulted for biopsy. Given patient's clinical condition and vasogenic brain edema, bronchoscopy with conscious sedation would not be optimal.    -Consult interventional radiology for biopsy of liver met vs lung mass.  -Heme/onc already on board.

## 2019-06-30 NOTE — PLAN OF CARE
Problem: Adult Inpatient Plan of Care  Goal: Plan of Care Review  Outcome: Revised  POC reviewed with Mr. Gil and wife at 1500. Pt and pt's wife verbalized understanding. Questions and concerns addressed. No acute events today. Heme-Onc and pulmonology consulted today. Plan is to obtain liver biopsy in IR either tomorrow or Tuesday. TTF to room 1109 when available. Pt progressing toward goals. Will continue to monitor. See flowsheets for full assessment and VS info.

## 2019-06-30 NOTE — PLAN OF CARE
Problem: Adult Inpatient Plan of Care  Goal: Plan of Care Review  Outcome: Ongoing (interventions implemented as appropriate)  Nutrition assessment completed. Please see RD note for details.    Recommendation/Intervention:   Continue Regular diet. Pt declines Boost ONS at this time.     RD to monitor.    Goals: Pt to tolerate >85% EEN and EPN by RD follow up  Nutrition Goal Status: new  Communication of RD Recs: reviewed with RN

## 2019-06-30 NOTE — PROGRESS NOTES
"[facility]  Neurosurgery  Progress Note    Subjective:     History of Present Illness: Jeffery Reyna is a 74 yo male with a PMH of HTN, DM who presents to [facility] after transfer from [facility] for 3 days of headaches that prevented him from sleeping, as well as nausea with one episode of vomiting, dizziness, and mild diarrhea with "chills" in the same time period.  He states that he had new onset of generalized headaches retroorbital, frontal and occipital to about 7-8/10 pain which are constant which began about 3 days ago. He has had no recent falls or head trauma.  He had an episode of vomiting after eating 2 days ago, and since has not had solid food and has had persistent nausea.  He also endorses dizziness which is minor and still allows him to walk during the same time period as well as Right hand numbness on review of systems. He notes no weakness and denies vision change.  He has a history of 1 pack per year smoking for 55 years, daily 6 beer per day etoh use as well as a family history of colon cancer in his brother and mother with no colonoscopy and prostate cancer to his cousin.  He has not had recent travel or other illness. He takes ASA 81 for prevention. History of a DVT in the leg many years ago to which he hs no anticoagulation or other antiplatelet medicine. Presents for work-up after several hemorrhagic brain lesions found at OSH.     Post-Op Info:  * No surgery found *         Interval History: naeon    Medications:  Continuous Infusions:   lactated ringers 50 mL/hr at 06/30/19 0605     Scheduled Meds:   amLODIPine  10 mg Oral Daily    dexamethasone  4 mg Intravenous Q6H    folic acid  1 mg Oral Daily    senna-docusate 8.6-50 mg  1 tablet Oral BID    thiamine  100 mg Oral Daily     PRN Meds:acetaminophen, hydrALAZINE, ondansetron, oxyCODONE, sodium chloride 0.9%     Review of Systems  Objective:     Weight: 87.9 kg (193 lb 12.6 oz)  Body mass index is 27.81 " kg/m².  Vital Signs (Most Recent):  Temp: 98.4 °F (36.9 °C) (06/30/19 0305)  Pulse: 64 (06/30/19 0605)  Resp: 12 (06/30/19 0605)  BP: (!) 158/85 (06/30/19 0605)  SpO2: (!) 94 % (06/30/19 0605) Vital Signs (24h Range):  Temp:  [97.7 °F (36.5 °C)-98.5 °F (36.9 °C)] 98.4 °F (36.9 °C)  Pulse:  [55-96] 64  Resp:  [10-22] 12  SpO2:  [92 %-100 %] 94 %  BP: (115-173)/(65-96) 158/85                          Neurosurgery Physical Exam   AOX3, speech fluent  PERRL, face symm, tongue midline  GIL symm  SILT  No drift    Significant Labs:  Recent Labs   Lab 06/29/19  1135 06/30/19  0229   * 138*    136   K 4.4 4.2    105   CO2 24 18*   BUN 10 10   CREATININE 1.0 0.8   CALCIUM 10.5 9.8   MG 2.0 1.7     Recent Labs   Lab 06/29/19  1135 06/30/19  0139   WBC 10.65 10.86   HGB 14.8 15.2   HCT 44.3 45.0   * 460*     Recent Labs   Lab 06/29/19  1135 06/29/19  1445   INR 1.1  --    APTT  --  24.6     Microbiology Results (last 7 days)     ** No results found for the last 168 hours. **            Significant Diagnostics:  MRI brain: multiple hemorrhagic lesions concerning for metastatic process  CT c/a/p: right lung lesion, multiple liver lesions, thyroid nodule.    Assessment/Plan:     Brain lesion  72 yo male with a PMH of HTN, DM, daily ASA, 55 pack year smoking, daily etoh use who presents with severe headache, nausea/vomiting found to have brain lesions with hemorrhagic component suspicious for metastasis.  Awake, alert and oriented x 3 with L visual field cut, R hand numbness.     --Admit to neuro ICU for q1 hour neurochecks  --MRI Brain stealth obtained with at least 4 distinct enhancing lesions R frontal 2.5x3cm lesion, 2.3x1.4 R occipital lesion most prominent. No hydrocephalus.   --CT C/A/P with contrast for search of primary lesion  --Consult hem/onc for staging  --SBP < 160 in setting of acute hemorrhage (labetalol / hydralazine prn or cardene gtt)  --Dex 10, 4q6 for edema  --Please call nsgy with  any change in exam  --Full pre-op labs  --Will follow closely        Nontraumatic multiple localized intracerebral hemorrhages  73 M admitted for progressive HA found to have multiple hemorrhagic intracerebral lesions concerning for mets.  CT c/a/p with right lung lesion, multiple liver lesions.  -q 1 hr neuro checks  -SBP less than 150  -Keppra, dex  -Goal eunatremia  -Consider lung biopsy for tissue diagnosis  -Fu hemonc recs  -Further care per NCC        Cornelio Edmonds,   Neurosurgery  [facility]

## 2019-06-30 NOTE — PROGRESS NOTES
2215 Pt transported to CT on continuous portable monitoring by RNx1 and PCTx1. Ambu bag accompanied travel. VSS throughout travel period.     2240 Pt returned to room 9090. Bed locked in lowest possible position. Call light within reach of pt. Will continue to monitor.

## 2019-06-30 NOTE — NURSING
Patient arrived to [facility] from [facility] via [facility] > ED > [facility] via stretcher    Type of stroke/diagnosis: Brain mass with hemorrhage    Current symptoms: neuro intact, no deficits, slight HA    Skin assessment done: Y  Wounds noted: none    NCC notified: ZHANG Greenwood

## 2019-06-30 NOTE — ASSESSMENT & PLAN NOTE
73 M admitted for progressive HA found to have multiple hemorrhagic intracerebral lesions concerning for mets.  CT c/a/p with right lung lesion, multiple liver lesions.  -q 1 hr neuro checks  -SBP less than 150  -Keppra, dex  -Goal eunatremia  -Consider lung biopsy for tissue diagnosis  -Fu hemonc recs  -Further care per NCC

## 2019-06-30 NOTE — ASSESSMENT & PLAN NOTE
-- History of smoking 1PPD  -- May need Nicotine patch  -- Smoking cessation counseling when appropriate

## 2019-07-01 PROBLEM — F10.10 ALCOHOL ABUSE, DAILY USE: Status: RESOLVED | Noted: 2019-06-29 | Resolved: 2019-07-01

## 2019-07-01 PROBLEM — G93.9 BRAIN LESION: Status: RESOLVED | Noted: 2019-06-29 | Resolved: 2019-07-01

## 2019-07-01 PROBLEM — Z51.5 PALLIATIVE CARE ENCOUNTER: Status: ACTIVE | Noted: 2019-07-01

## 2019-07-01 PROBLEM — Z86.718 HISTORY OF DVT (DEEP VEIN THROMBOSIS): Status: RESOLVED | Noted: 2019-06-29 | Resolved: 2019-07-01

## 2019-07-01 LAB
ALBUMIN SERPL BCP-MCNC: 3.5 G/DL (ref 3.5–5.2)
ALP SERPL-CCNC: 95 U/L (ref 55–135)
ALT SERPL W/O P-5'-P-CCNC: 17 U/L (ref 10–44)
ANION GAP SERPL CALC-SCNC: 10 MMOL/L (ref 8–16)
AST SERPL-CCNC: 21 U/L (ref 10–40)
BASOPHILS # BLD AUTO: 0.02 K/UL (ref 0–0.2)
BASOPHILS NFR BLD: 0.2 % (ref 0–1.9)
BILIRUB SERPL-MCNC: 0.7 MG/DL (ref 0.1–1)
BUN SERPL-MCNC: 18 MG/DL (ref 8–23)
CALCIUM SERPL-MCNC: 9.8 MG/DL (ref 8.7–10.5)
CHLORIDE SERPL-SCNC: 102 MMOL/L (ref 95–110)
CO2 SERPL-SCNC: 23 MMOL/L (ref 23–29)
CREAT SERPL-MCNC: 0.9 MG/DL (ref 0.5–1.4)
DIFFERENTIAL METHOD: ABNORMAL
EOSINOPHIL # BLD AUTO: 0 K/UL (ref 0–0.5)
EOSINOPHIL NFR BLD: 0 % (ref 0–8)
ERYTHROCYTE [DISTWIDTH] IN BLOOD BY AUTOMATED COUNT: 15.1 % (ref 11.5–14.5)
EST. GFR  (AFRICAN AMERICAN): >60 ML/MIN/1.73 M^2
EST. GFR  (NON AFRICAN AMERICAN): >60 ML/MIN/1.73 M^2
GLUCOSE SERPL-MCNC: 127 MG/DL (ref 70–110)
HCT VFR BLD AUTO: 43.7 % (ref 40–54)
HGB BLD-MCNC: 14.6 G/DL (ref 14–18)
IMM GRANULOCYTES # BLD AUTO: 0.06 K/UL (ref 0–0.04)
IMM GRANULOCYTES NFR BLD AUTO: 0.5 % (ref 0–0.5)
LYMPHOCYTES # BLD AUTO: 1 K/UL (ref 1–4.8)
LYMPHOCYTES NFR BLD: 7.8 % (ref 18–48)
MCH RBC QN AUTO: 28.4 PG (ref 27–31)
MCHC RBC AUTO-ENTMCNC: 33.4 G/DL (ref 32–36)
MCV RBC AUTO: 85 FL (ref 82–98)
MONOCYTES # BLD AUTO: 0.7 K/UL (ref 0.3–1)
MONOCYTES NFR BLD: 5.8 % (ref 4–15)
NEUTROPHILS # BLD AUTO: 10.4 K/UL (ref 1.8–7.7)
NEUTROPHILS NFR BLD: 85.7 % (ref 38–73)
NRBC BLD-RTO: 0 /100 WBC
PLATELET # BLD AUTO: 483 K/UL (ref 150–350)
PMV BLD AUTO: 9.7 FL (ref 9.2–12.9)
POCT GLUCOSE: 133 MG/DL (ref 70–110)
POCT GLUCOSE: 138 MG/DL (ref 70–110)
POCT GLUCOSE: 149 MG/DL (ref 70–110)
POCT GLUCOSE: 225 MG/DL (ref 70–110)
POTASSIUM SERPL-SCNC: 4.4 MMOL/L (ref 3.5–5.1)
PROT SERPL-MCNC: 6.9 G/DL (ref 6–8.4)
RBC # BLD AUTO: 5.14 M/UL (ref 4.6–6.2)
SODIUM SERPL-SCNC: 135 MMOL/L (ref 136–145)
WBC # BLD AUTO: 12.17 K/UL (ref 3.9–12.7)

## 2019-07-01 PROCEDURE — 99222 PR INITIAL HOSPITAL CARE,LEVL II: ICD-10-PCS | Mod: ,,, | Performed by: NURSE PRACTITIONER

## 2019-07-01 PROCEDURE — 92523 SPEECH SOUND LANG COMPREHEN: CPT

## 2019-07-01 PROCEDURE — 63600175 PHARM REV CODE 636 W HCPCS: Performed by: NURSE PRACTITIONER

## 2019-07-01 PROCEDURE — 85025 COMPLETE CBC W/AUTO DIFF WBC: CPT

## 2019-07-01 PROCEDURE — 11000001 HC ACUTE MED/SURG PRIVATE ROOM

## 2019-07-01 PROCEDURE — 25000003 PHARM REV CODE 250: Performed by: NURSE PRACTITIONER

## 2019-07-01 PROCEDURE — 99233 SBSQ HOSP IP/OBS HIGH 50: CPT | Mod: GC,,, | Performed by: NEUROLOGICAL SURGERY

## 2019-07-01 PROCEDURE — 99221 1ST HOSP IP/OBS SF/LOW 40: CPT | Mod: ,,, | Performed by: RADIOLOGY

## 2019-07-01 PROCEDURE — 99232 SBSQ HOSP IP/OBS MODERATE 35: CPT | Mod: ,,, | Performed by: HOSPITALIST

## 2019-07-01 PROCEDURE — 99232 PR SUBSEQUENT HOSPITAL CARE,LEVL II: ICD-10-PCS | Mod: ,,, | Performed by: HOSPITALIST

## 2019-07-01 PROCEDURE — 80053 COMPREHEN METABOLIC PANEL: CPT

## 2019-07-01 PROCEDURE — 92526 ORAL FUNCTION THERAPY: CPT

## 2019-07-01 PROCEDURE — 99222 1ST HOSP IP/OBS MODERATE 55: CPT | Mod: ,,, | Performed by: NURSE PRACTITIONER

## 2019-07-01 PROCEDURE — 99221 PR INITIAL HOSPITAL CARE,LEVL I: ICD-10-PCS | Mod: ,,, | Performed by: RADIOLOGY

## 2019-07-01 PROCEDURE — 99233 PR SUBSEQUENT HOSPITAL CARE,LEVL III: ICD-10-PCS | Mod: GC,,, | Performed by: NEUROLOGICAL SURGERY

## 2019-07-01 PROCEDURE — 36415 COLL VENOUS BLD VENIPUNCTURE: CPT

## 2019-07-01 RX ADMIN — SENNOSIDES,DOCUSATE SODIUM 1 TABLET: 8.6; 5 TABLET, FILM COATED ORAL at 08:07

## 2019-07-01 RX ADMIN — AMLODIPINE BESYLATE 10 MG: 10 TABLET ORAL at 08:07

## 2019-07-01 RX ADMIN — FOLIC ACID 1 MG: 1 TABLET ORAL at 08:07

## 2019-07-01 RX ADMIN — FAMOTIDINE 20 MG: 20 TABLET, FILM COATED ORAL at 08:07

## 2019-07-01 RX ADMIN — Medication 100 MG: at 08:07

## 2019-07-01 RX ADMIN — INSULIN ASPART 4 UNITS: 100 INJECTION, SOLUTION INTRAVENOUS; SUBCUTANEOUS at 10:07

## 2019-07-01 RX ADMIN — DEXAMETHASONE SODIUM PHOSPHATE 4 MG: 4 INJECTION, SOLUTION INTRAMUSCULAR; INTRAVENOUS at 11:07

## 2019-07-01 RX ADMIN — DEXAMETHASONE SODIUM PHOSPHATE 4 MG: 4 INJECTION, SOLUTION INTRAMUSCULAR; INTRAVENOUS at 05:07

## 2019-07-01 RX ADMIN — OXYCODONE HYDROCHLORIDE 5 MG: 5 TABLET ORAL at 06:07

## 2019-07-01 RX ADMIN — SENNOSIDES,DOCUSATE SODIUM 1 TABLET: 8.6; 5 TABLET, FILM COATED ORAL at 09:07

## 2019-07-01 RX ADMIN — OXYCODONE HYDROCHLORIDE 5 MG: 5 TABLET ORAL at 09:07

## 2019-07-01 RX ADMIN — DEXAMETHASONE SODIUM PHOSPHATE 4 MG: 4 INJECTION, SOLUTION INTRAMUSCULAR; INTRAVENOUS at 12:07

## 2019-07-01 NOTE — PROGRESS NOTES
[facility] Hospital Medicine  Progress Note    Patient Name: Jeffery Reyna  MRN: [id number]  Patient Class: IP- Inpatient   Admission Date: 6/29/2019  Length of Stay: 2 days  Attending Physician: Jaja Weaver MD  Primary Care Provider: Flori Harmon MD    [facility] Medicine Team: [facility] HOSP MED G Jaja Weaver MD    Subjective:     Principal Problem:Mass of upper lobe of right lung      HPI:  No notes on file    Overview/Hospital Course:  No notes on file    Interval History: Stepped down to MICU. Feels well. Wife at bedside.     Review of Systems   Constitutional: Negative for chills, fatigue and fever.   HENT: Negative.    Eyes: Negative for visual disturbance.   Respiratory: Negative for cough, shortness of breath and wheezing.    Cardiovascular: Negative for chest pain and palpitations.   Gastrointestinal: Negative for abdominal pain, constipation, diarrhea, nausea and vomiting.   Genitourinary: Negative for difficulty urinating.   Musculoskeletal: Negative.    Skin: Negative for rash.   Neurological: Negative for speech difficulty, light-headedness, numbness and headaches.   Hematological: Does not bruise/bleed easily.   Psychiatric/Behavioral: Negative for hallucinations. The patient is not nervous/anxious.      Objective:     Vital Signs (Most Recent):  Temp: 98.1 °F (36.7 °C) (07/01/19 0407)  Pulse: (!) 58 (07/01/19 1122)  Resp: 16 (07/01/19 0900)  BP: 135/82 (07/01/19 0900)  SpO2: 97 % (07/01/19 0900) Vital Signs (24h Range):  Temp:  [98 °F (36.7 °C)-98.6 °F (37 °C)] 98.1 °F (36.7 °C)  Pulse:  [53-80] 58  Resp:  [11-24] 16  SpO2:  [92 %-97 %] 97 %  BP: (117-147)/(72-86) 135/82     Weight: 87.5 kg (193 lb)  Body mass index is 27.69 kg/m².    Intake/Output Summary (Last 24 hours) at 7/1/2019 1301  Last data filed at 7/1/2019 1100  Gross per 24 hour   Intake 720 ml   Output 900 ml   Net -180 ml      Physical Exam   Constitutional: He is oriented to person, place, and time. He appears  well-developed and well-nourished. No distress.   HENT:   Head: Normocephalic and atraumatic.   Eyes: No scleral icterus.   Cardiovascular: Normal rate and regular rhythm.   Pulmonary/Chest: Effort normal and breath sounds normal. No respiratory distress.   Abdominal: Soft. Bowel sounds are normal. He exhibits no distension and no mass. There is no tenderness. There is no guarding.   Musculoskeletal: He exhibits no edema.   Neurological: He is alert and oriented to person, place, and time.   Skin: No erythema.   Psychiatric: He has a normal mood and affect.   Vitals reviewed.      Significant Labs: All pertinent labs within the past 24 hours have been reviewed.    Significant Imaging: I have reviewed all pertinent imaging results/findings within the past 24 hours.      Assessment/Plan:      * Mass of upper lobe of right lung  Suspicious for metastatic lung CA given brain and liver mets  Continue dexamethasone  Not on keppra per NCC on stepdown  Oncologyconsulted and patient made aware of findings and possible need for palliative approach  Oncology advised for rad onc consult; consult placed  Palliative care consulted as well   Will discuss with IR; may need to have biopsy of liver done outpatient  Pulmonary consult was done but high risk given emphysema therefore pulm would prefer IR liver biopsy   Continue dexamethasone        Liver metastasis  As above       Nontraumatic multiple localized intracerebral hemorrhages  Secondary to brain mets  Not on anticoagulation       Vasogenic brain edema  As above       Brain metastasis  As above       Essential hypertension  Controlled on amlodipine        VTE Risk Mitigation (From admission, onward)        Ordered     Reason for No Pharmacological VTE Prophylaxis  Once      06/29/19 1739     IP VTE HIGH RISK PATIENT  Once      06/29/19 1739     Place sequential compression device  Until discontinued      06/29/19 1739                Jaja Weaver MD  Department of Hospital  Medicine   [facility]

## 2019-07-01 NOTE — SUBJECTIVE & OBJECTIVE
"Oncology Treatment Plan:   [No treatment plan]    Current Facility-Administered Medications   Medication    acetaminophen tablet 650 mg    amLODIPine tablet 10 mg    dexamethasone injection 4 mg    dextrose 10% (D10W) Bolus    dextrose 10% (D10W) Bolus    famotidine tablet 20 mg    folic acid tablet 1 mg    glucagon (human recombinant) injection 1 mg    glucose chewable tablet 16 g    glucose chewable tablet 24 g    hydrALAZINE injection 10 mg    insulin aspart U-100 pen 1-10 Units    ondansetron injection 4 mg    oxyCODONE immediate release tablet 5 mg    senna-docusate 8.6-50 mg per tablet 1 tablet    sodium chloride 0.9% flush 10 mL    thiamine tablet 100 mg       Review of patient's allergies indicates:  No Known Allergies     Past Medical History:   Diagnosis Date    DVT (deep venous thrombosis)     Hypertension      History reviewed. No pertinent surgical history.  Family History     Problem Relation (Age of Onset)    Cancer Mother    Diabetes Mother    Hypertension Sister    Thyroid disease Mother        Tobacco Use    Smoking status: Current Every Day Smoker     Packs/day: 1.00     Types: Cigarettes    Smokeless tobacco: Never Used   Substance and Sexual Activity    Alcohol use: Yes     Comment: daily "6 beers"    Drug use: No    Sexual activity: Not on file       Review of Systems   Neurological: Positive for headaches. Negative for dizziness, seizures, syncope, speech difficulty and weakness.     Objective:     Vital Signs (Most Recent):  Temp: 98.1 °F (36.7 °C) (07/01/19 0407)  Pulse: (!) 58 (07/01/19 1122)  Resp: 16 (07/01/19 0900)  BP: 135/82 (07/01/19 0900)  SpO2: 97 % (07/01/19 0900) Vital Signs (24h Range):  Temp:  [98 °F (36.7 °C)-98.6 °F (37 °C)] 98.1 °F (36.7 °C)  Pulse:  [53-80] 58  Resp:  [11-24] 16  SpO2:  [92 %-97 %] 97 %  BP: (117-147)/(72-82) 135/82     Weight: 87.5 kg (193 lb)  Body mass index is 27.69 kg/m².  Body surface area is 2.08 meters squared.    Physical " Exam   Constitutional: He is oriented to person, place, and time. He appears well-developed and well-nourished.   HENT:   Head: Normocephalic.   Mouth/Throat: Oropharynx is clear and moist.   Eyes: Pupils are equal, round, and reactive to light. EOM are normal.   Neck: Normal range of motion. Neck supple.   Cardiovascular: Intact distal pulses.   Pulmonary/Chest: Effort normal. No accessory muscle usage. No respiratory distress.   Abdominal: Soft. There is no tenderness.   Musculoskeletal: Normal range of motion. He exhibits no edema.   Lymphadenopathy:     He has no cervical adenopathy.   Neurological: He is alert and oriented to person, place, and time. He has normal strength. No cranial nerve deficit.   Skin: Skin is warm and dry.   Psychiatric: He has a normal mood and affect. Judgment and thought content normal.   Vitals reviewed.      Diagnostic Results:  I have personally reviewed the patient's available images and reports and summarized pertinent findings above in HPI.

## 2019-07-01 NOTE — ASSESSMENT & PLAN NOTE
-MRI brain revealed several nonenhancing hemorrhagic lesions in the right occipital lobe, right frontal and parietal lobes, and left frontal lobe with surrounding vasogenic edema  - NSGY consulted and started on steroids.  -Hem/Onc, Rad Onc and Palliative consulted    Recommendations  -  Encourage mobility, OOB in chair at least 3 hours per day, and early ambulation as appropriate  -  PT/OT evaluate and treat  -  Pain management  -  Monitor for and prevent skin breakdown and pressure ulcers  · Early mobility, repositioning/weight shifting every 20-30 minutes when sitting, turn patient every 2 hours, proper mattress/overlay and chair cushioning, pressure relief/heel protector boots  -  DVT prophylaxis    -  Reviewed discharge options (IP rehab, SNF, HH therapy, and OP therapy)

## 2019-07-01 NOTE — SUBJECTIVE & OBJECTIVE
"Past Medical History:   Diagnosis Date    DVT (deep venous thrombosis)     Hypertension      History reviewed. No pertinent surgical history.  Review of patient's allergies indicates:  No Known Allergies    Scheduled Medications:    amLODIPine  10 mg Oral Daily    dexamethasone  4 mg Intravenous Q6H    famotidine  20 mg Oral Daily    folic acid  1 mg Oral Daily    senna-docusate 8.6-50 mg  1 tablet Oral BID    thiamine  100 mg Oral Daily       PRN Medications: acetaminophen, Dextrose 10% Bolus, Dextrose 10% Bolus, glucagon (human recombinant), glucose, glucose, hydrALAZINE, insulin aspart U-100, magnesium oxide, magnesium oxide, ondansetron, oxyCODONE, potassium chloride 10%, potassium chloride 10%, potassium chloride 10%, potassium, sodium phosphates, potassium, sodium phosphates, potassium, sodium phosphates, sodium chloride 0.9%    Family History     Problem Relation (Age of Onset)    Cancer Mother    Diabetes Mother    Hypertension Sister    Thyroid disease Mother        Tobacco Use    Smoking status: Current Every Day Smoker     Packs/day: 1.00     Types: Cigarettes    Smokeless tobacco: Never Used   Substance and Sexual Activity    Alcohol use: Yes     Comment: daily "6 beers"    Drug use: No    Sexual activity: Not on file     Review of Systems   Constitutional: Positive for activity change. Negative for fatigue.   HENT: Negative for trouble swallowing and voice change.    Eyes: Negative for photophobia and visual disturbance.   Respiratory: Negative for cough and shortness of breath.    Cardiovascular: Negative for chest pain and palpitations.   Gastrointestinal: Negative for nausea and vomiting.   Genitourinary: Negative for difficulty urinating and flank pain.   Musculoskeletal: Positive for gait problem. Negative for arthralgias.   Skin: Negative for color change and rash.   Neurological: Negative for speech difficulty, weakness and numbness.   Psychiatric/Behavioral: Positive for confusion. " Negative for agitation.     Objective:     Vital Signs (Most Recent):  Temp: 98.1 °F (36.7 °C) (07/01/19 0407)  Pulse: (!) 58 (07/01/19 1122)  Resp: 19 (07/01/19 0700)  BP: 117/72 (07/01/19 0700)  SpO2: (!) 94 % (07/01/19 0700)    Vital Signs (24h Range):  Temp:  [98 °F (36.7 °C)-98.6 °F (37 °C)] 98.1 °F (36.7 °C)  Pulse:  [53-80] 58  Resp:  [11-24] 19  SpO2:  [92 %-97 %] 94 %  BP: (117-147)/(72-86) 117/72     Body mass index is 27.69 kg/m².    Physical Exam   Constitutional: He appears well-developed and well-nourished.   HENT:   Head: Normocephalic and atraumatic.   Eyes: Right eye exhibits no discharge. Left eye exhibits no discharge.   Neck: Neck supple.   Cardiovascular: Normal rate and intact distal pulses.   Pulmonary/Chest: Effort normal. No respiratory distress.   Abdominal: Soft. He exhibits no distension.   Musculoskeletal: He exhibits no edema or deformity.   No focal weakness    Neurological: He is alert.   Oriented to self, but not place or year    Skin: Skin is warm and dry.   Psychiatric: He has a normal mood and affect. His behavior is normal. Cognition and memory are impaired.          Diagnostic Results:   Labs: Reviewed  ECG: Reviewed  X-Ray: Reviewed  US: Reviewed  CT: Reviewed  MRI: Reviewed

## 2019-07-01 NOTE — PLAN OF CARE
"CM met with patient to complete the discharge planning assessment. Patient was given the [facility] " My Health Packet" and CM placed name and phone number on the whiteboard. After reviewing packet, patient expressed understanding of the discharge process.      Flori Harmon MD   [address] [facility] [facility] [place] [address]       [facility] [employer] [facility] [address] - [place], [place] - [address] AT [facility] & [address]  [address]  [place] [address]  Phone: [phone] Fax: [phone]    Payor: [employer] MEDICARE / Plan: [employer] HEALTH / Product Type: Medicare Advantage /             07/01/19 1416   Discharge Assessment   Assessment Type Discharge Planning Assessment   Confirmed/corrected address and phone number on facesheet? Yes   Assessment information obtained from? Patient;Medical Record   Expected Length of Stay (days) 3   Communicated expected length of stay with patient/caregiver yes   Prior to hospitilization cognitive status: Alert/Oriented   Prior to hospitalization functional status: Independent   Current cognitive status: Alert/Oriented   Current Functional Status: Independent   Lives With significant other   Able to Return to Prior Arrangements yes   Is patient able to care for self after discharge? Unable to determine at this time (comments)   Patient's perception of discharge disposition home or selfcare   Readmission Within the Last 30 Days no previous admission in last 30 days   Patient currently being followed by outpatient case management? No   Patient currently receives any other outside agency services? No   Equipment Currently Used at Home none   Do you have any problems affording any of your prescribed medications? No   Is the patient taking medications as prescribed? yes   Does the patient have transportation home? Yes   Transportation Anticipated family or friend will provide   Does the patient receive services at the [facility]mad[facility] Clinic? No   Discharge Plan A Home with " family   DME Needed Upon Discharge  other (see comments)  (TBD)   Patient/Family in Agreement with Plan yes

## 2019-07-01 NOTE — HPI
Mr. Reyna is a  72 yo gentleman who presents to [facility] [place] from OSH with PMH of HTN and DVT. C/o having headache for three days and associated with nausea and vomiting.   OSH completed  CT of head indicating multiple hemorrhagic lesions on CTH.  MRI Brain w and wo shows several nonenhancing hemorrhagic lesions in the right occipital lobe, right frontal and parietal lobes, and left frontal lobe with surrounding vasogenic edema.    No history of cancer and patient has long term history of smoking and alcohol use. Neurosurgery and oncology consulted.  Neuro sx recommends no biopsy of brain lesions secondary to  Hemorrhagic nature of the lesions.  Oncology has discussed imaging is consistent with metastatic lung cancer to liver and brain.  Has discussed the incurable nature of the disease and recommended cx for radiation oncology and palliative care.     Palliative medicine consulted for goals of care and advanced care planning

## 2019-07-01 NOTE — SUBJECTIVE & OBJECTIVE
Interval History: Stepped down to MICU. Feels well. Wife at bedside.     Review of Systems   Constitutional: Negative for chills, fatigue and fever.   HENT: Negative.    Eyes: Negative for visual disturbance.   Respiratory: Negative for cough, shortness of breath and wheezing.    Cardiovascular: Negative for chest pain and palpitations.   Gastrointestinal: Negative for abdominal pain, constipation, diarrhea, nausea and vomiting.   Genitourinary: Negative for difficulty urinating.   Musculoskeletal: Negative.    Skin: Negative for rash.   Neurological: Negative for speech difficulty, light-headedness, numbness and headaches.   Hematological: Does not bruise/bleed easily.   Psychiatric/Behavioral: Negative for hallucinations. The patient is not nervous/anxious.      Objective:     Vital Signs (Most Recent):  Temp: 98.1 °F (36.7 °C) (07/01/19 0407)  Pulse: (!) 58 (07/01/19 1122)  Resp: 16 (07/01/19 0900)  BP: 135/82 (07/01/19 0900)  SpO2: 97 % (07/01/19 0900) Vital Signs (24h Range):  Temp:  [98 °F (36.7 °C)-98.6 °F (37 °C)] 98.1 °F (36.7 °C)  Pulse:  [53-80] 58  Resp:  [11-24] 16  SpO2:  [92 %-97 %] 97 %  BP: (117-147)/(72-86) 135/82     Weight: 87.5 kg (193 lb)  Body mass index is 27.69 kg/m².    Intake/Output Summary (Last 24 hours) at 7/1/2019 1301  Last data filed at 7/1/2019 1100  Gross per 24 hour   Intake 720 ml   Output 900 ml   Net -180 ml      Physical Exam   Constitutional: He is oriented to person, place, and time. He appears well-developed and well-nourished. No distress.   HENT:   Head: Normocephalic and atraumatic.   Eyes: No scleral icterus.   Cardiovascular: Normal rate and regular rhythm.   Pulmonary/Chest: Effort normal and breath sounds normal. No respiratory distress.   Abdominal: Soft. Bowel sounds are normal. He exhibits no distension and no mass. There is no tenderness. There is no guarding.   Musculoskeletal: He exhibits no edema.   Neurological: He is alert and oriented to person, place, and  time.   Skin: No erythema.   Psychiatric: He has a normal mood and affect.   Vitals reviewed.      Significant Labs: All pertinent labs within the past 24 hours have been reviewed.    Significant Imaging: I have reviewed all pertinent imaging results/findings within the past 24 hours.

## 2019-07-01 NOTE — CONSULTS
[facility]  Palliative Medicine  Consult Note    Patient Name: Jeffery Reyna  MRN: [id number]  Admission Date: 6/29/2019  Hospital Length of Stay: 2 days  Code Status: Full Code   Attending Provider: Jaja Weaver MD  Consulting Provider: OSCAR Lyons  Primary Care Physician: Flori Harmon MD  Principal Problem:Brain metastasis    Inpatient consult to Palliative Care  Consult performed by: OSCAR Mariscal  Consult ordered by: Jaja Weaver MD  Reason for consult: goals of care   Assessment/Recommendations: Palliative medicine consult received.  Chart reviewed and patient discussed with Dr. Weaver.    Full consult to follow.   Thank you for consult and opportunity to participate in Mr. Reyna's care.   AMY Bernardo, ACNS-BC, OCN

## 2019-07-01 NOTE — ASSESSMENT & PLAN NOTE
This is a 72 y/o male smoker with likely primary lung cancer, who presented 6/29/19 with several day h/o HA, N/V, and gait imbalance. MRI Brain demonstrates 4-5 hemorrhage lesions c/w metastases. CT C/A/P demonstrates Right lung mass, adenopathy, and likely liver metastases. Biopsy of liver is pending. Symptoms have mostly resolved with decadron. Radiation Oncology is consulted for consideration of treatment options.     I discussed treatment options for brain metastases including surgery and radiation. Neurosurgery has evaluated the patient and did not recommend surgical intervention. Given the size of his metastases, I recommend treatment with whole brain RT to 30 Gy in 10 fractions. Potential short- and long-term side effects of WBRT were reviewed with the patient. At the end of our conversation, he was in agreement with proceeding with the recommended treatment.     Will plan for CT Simulation either tomorrow or Wednesday, and anticipate starting treatment Friday 7/5/19 or Monday 7/8/19.

## 2019-07-01 NOTE — CONSULTS
[facility]  Radiation Oncology  Consult Note    Patient Name: Jeffery Reyna  MRN: [id number]  Admission Date: 6/29/2019  Hospital Length of Stay: 2 days  Code Status: Full Code   Attending Provider: Jaja Weaver MD  Consulting Provider: Leobardo Bonner MD  Primary Care Physician: Flori Harmon MD  Principal Problem:Mass of upper lobe of right lung    Inpatient consult to Radiation Oncology  Consult performed by: Leobardo Bonner MD  Consult ordered by: Jaja Weaver MD        Subjective:     HPI:  Mr. Reyna is a 74 y/o male with significant smoking history who was transferred from OSH 6/29/19 for HA, N/V, gait imbalance and found to have multiple hemorrhagic brain lesions on CT Head. MRI Brain demonstrates 4-5 hemorrhagic lesions with the largest in Right frontal lobe and Right occipital lobe. CT C/A/P 6/30/19 demonstrated Right lung mass and multiple enhancing lesions in the liver c/w possible primary lung cancer. Biopsy is pending. His symptoms have improved with decadron. Radiation Oncology is consulted for consideration of treatment options.     I met with the patient at bedside. He reports no HA for the past several hours, no N/V, no focal weakness or imbalance, no speech difficulty, and no seizures.     Oncology Treatment Plan:   [No treatment plan]    Current Facility-Administered Medications   Medication    acetaminophen tablet 650 mg    amLODIPine tablet 10 mg    dexamethasone injection 4 mg    dextrose 10% (D10W) Bolus    dextrose 10% (D10W) Bolus    famotidine tablet 20 mg    folic acid tablet 1 mg    glucagon (human recombinant) injection 1 mg    glucose chewable tablet 16 g    glucose chewable tablet 24 g    hydrALAZINE injection 10 mg    insulin aspart U-100 pen 1-10 Units    ondansetron injection 4 mg    oxyCODONE immediate release tablet 5 mg    senna-docusate 8.6-50 mg per tablet 1 tablet    sodium chloride 0.9% flush 10 mL    thiamine tablet 100 mg  "      Review of patient's allergies indicates:  No Known Allergies     Past Medical History:   Diagnosis Date    DVT (deep venous thrombosis)     Hypertension      History reviewed. No pertinent surgical history.  Family History     Problem Relation (Age of Onset)    Cancer Mother    Diabetes Mother    Hypertension Sister    Thyroid disease Mother        Tobacco Use    Smoking status: Current Every Day Smoker     Packs/day: 1.00     Types: Cigarettes    Smokeless tobacco: Never Used   Substance and Sexual Activity    Alcohol use: Yes     Comment: daily "6 beers"    Drug use: No    Sexual activity: Not on file       Review of Systems   Neurological: Positive for headaches. Negative for dizziness, seizures, syncope, speech difficulty and weakness.     Objective:     Vital Signs (Most Recent):  Temp: 98.1 °F (36.7 °C) (07/01/19 0407)  Pulse: (!) 58 (07/01/19 1122)  Resp: 16 (07/01/19 0900)  BP: 135/82 (07/01/19 0900)  SpO2: 97 % (07/01/19 0900) Vital Signs (24h Range):  Temp:  [98 °F (36.7 °C)-98.6 °F (37 °C)] 98.1 °F (36.7 °C)  Pulse:  [53-80] 58  Resp:  [11-24] 16  SpO2:  [92 %-97 %] 97 %  BP: (117-147)/(72-82) 135/82     Weight: 87.5 kg (193 lb)  Body mass index is 27.69 kg/m².  Body surface area is 2.08 meters squared.    Physical Exam   Constitutional: He is oriented to person, place, and time. He appears well-developed and well-nourished.   HENT:   Head: Normocephalic.   Mouth/Throat: Oropharynx is clear and moist.   Eyes: Pupils are equal, round, and reactive to light. EOM are normal.   Neck: Normal range of motion. Neck supple.   Cardiovascular: Intact distal pulses.   Pulmonary/Chest: Effort normal. No accessory muscle usage. No respiratory distress.   Abdominal: Soft. There is no tenderness.   Musculoskeletal: Normal range of motion. He exhibits no edema.   Lymphadenopathy:     He has no cervical adenopathy.   Neurological: He is alert and oriented to person, place, and time. He has normal strength. No " cranial nerve deficit.   Skin: Skin is warm and dry.   Psychiatric: He has a normal mood and affect. Judgment and thought content normal.   Vitals reviewed.      Diagnostic Results:  I have personally reviewed the patient's available images and reports and summarized pertinent findings above in HPI.     Assessment/Plan:     Brain metastasis  This is a 72 y/o male smoker with likely primary lung cancer, who presented 6/29/19 with several day h/o HA, N/V, and gait imbalance. MRI Brain demonstrates 4-5 hemorrhage lesions c/w metastases. CT C/A/P demonstrates Right lung mass, adenopathy, and likely liver metastases. Biopsy of liver is pending. Symptoms have mostly resolved with decadron. Radiation Oncology is consulted for consideration of treatment options.     I discussed treatment options for brain metastases including surgery and radiation. Neurosurgery has evaluated the patient and did not recommend surgical intervention. Given the size of his metastases, I recommend treatment with whole brain RT to 30 Gy in 10 fractions. Potential short- and long-term side effects of WBRT were reviewed with the patient. At the end of our conversation, he was in agreement with proceeding with the recommended treatment.     Will plan for CT Simulation either tomorrow or Wednesday, and anticipate starting treatment Friday 7/5/19 or Monday 7/8/19.         Thank you for your consult. I will follow-up with patient. Please contact us if you have any additional questions.    Leobardo Bonner MD  Radiation Oncology  [facility]

## 2019-07-01 NOTE — HPI
Jeffery Reyna is a 73-year-old male with PMHx of HTN and DVT.  Patient presented to OSH with HA, N/V x 3 days.  OSH imaging revealed multiple hemorrhagic lesions. Transferred to [facility] on 6/29 for further evaluation and management.  Upon admission, MRI brain revealed several nonenhancing hemorrhagic lesions in the right occipital lobe, right frontal and parietal lobes, and left frontal lobe with surrounding vasogenic edema. NSGY consulted and started on steroids.  CT a/p revealed mass in the right upper lung lobe and multiple hepatic lesions. Hem/Onc, Rad Onc and Palliative consulted.     Functional History: Patient lives with significant other in a single story home with a threshold to enter.  Prior to admission, (I) with ADLs and mobility.  DME: none.

## 2019-07-01 NOTE — PLAN OF CARE
Problem: SLP Goal  Goal: SLP Goal  Speech Language Pathology Goals  Goals expected to be met by 7/7/2019  1. Pt will tolerate regular diet with thin liquids w/o overt S/S aspiration, MOD I  2. Pt will participate in speech, language and cognitive assessment  3. Educate Pt and family on S/S aspiration and safe swallow strategies      Outcome: Ongoing (interventions implemented as appropriate)  Tolerating regular diet with thin liquids with no s/s of aspiration  Sylvia Magallanes MA/AVA-SLP  Speech Language Pathologist  Pager [phone]  7/1/2019

## 2019-07-01 NOTE — HOSPITAL COURSE
6/30/19: Passed bedside swallow evaluation.  SLP recommending regular diet and thin liquids. SLP risk of aspiration remains 2/2 lethargy and attention. PT/OT Bed mobility, sit to stand, & transfers Mod (I)-SV. Ambulated 12 ft Mod (I).  LBD SV.

## 2019-07-01 NOTE — ASSESSMENT & PLAN NOTE
73 M admitted for progressive HA found to have multiple hemorrhagic intracerebral lesions concerning for mets.  CT c/a/p with right lung lesion, multiple liver lesions.  -Continue work up per primary  - Rec against brain biopsy given hemorrhagic mets  - Biopsy of lung/liver per IR - planning for outpatient procedure.    Neurosurgery will sign off, please call with questions.

## 2019-07-01 NOTE — PLAN OF CARE
Problem: Fall Injury Risk  Goal: Absence of Fall and Fall-Related Injury  Outcome: Ongoing (interventions implemented as appropriate)  Patient denies having any dizziness. During night became forgetful as to where he was. He was easily re-oriented. Ayas[employer] monitor placed to bedside for safety.     Problem: Seizure, Active Management  Goal: Absence of Seizure/Seizure-Related Injury  Outcome: Ongoing (interventions implemented as appropriate)  Seizure precautions maintained. No seizure activity noted.

## 2019-07-01 NOTE — SUBJECTIVE & OBJECTIVE
Interval History: NAEON. IR recommending outpatient biopsy. Neuro exam stable, continues c/o paresthesias in R hand.     Medications:  Continuous Infusions:  Scheduled Meds:   amLODIPine  10 mg Oral Daily    dexamethasone  4 mg Intravenous Q6H    famotidine  20 mg Oral Daily    folic acid  1 mg Oral Daily    senna-docusate 8.6-50 mg  1 tablet Oral BID    thiamine  100 mg Oral Daily     PRN Meds:acetaminophen, Dextrose 10% Bolus, Dextrose 10% Bolus, glucagon (human recombinant), glucose, glucose, hydrALAZINE, insulin aspart U-100, magnesium oxide, magnesium oxide, ondansetron, oxyCODONE, potassium chloride 10%, potassium chloride 10%, potassium chloride 10%, potassium, sodium phosphates, potassium, sodium phosphates, potassium, sodium phosphates, sodium chloride 0.9%     Review of Systems  Objective:     Weight: 87.5 kg (193 lb)  Body mass index is 27.69 kg/m².  Vital Signs (Most Recent):  Temp: 98.1 °F (36.7 °C) (07/01/19 0407)  Pulse: 63 (07/01/19 0702)  Resp: 13 (07/01/19 0407)  BP: 128/78 (07/01/19 0407)  SpO2: 95 % (07/01/19 0407) Vital Signs (24h Range):  Temp:  [98 °F (36.7 °C)-98.6 °F (37 °C)] 98.1 °F (36.7 °C)  Pulse:  [53-86] 63  Resp:  [10-24] 13  SpO2:  [92 %-98 %] 95 %  BP: (122-154)/(74-86) 128/78                          Neurosurgery Physical Exam  AOX3, speech fluent  PERRL, face symm, tongue midline  Peripheral visual fields blurred bilaterally  GIL symm  SILT  No drift       Significant Labs:  Recent Labs   Lab 06/29/19  1135 06/30/19  0229 07/01/19  0533   * 138* 127*    136 135*   K 4.4 4.2 4.4    105 102   CO2 24 18* 23   BUN 10 10 18   CREATININE 1.0 0.8 0.9   CALCIUM 10.5 9.8 9.8   MG 2.0 1.7  --      Recent Labs   Lab 06/29/19  1135 06/30/19  0139 07/01/19  0533   WBC 10.65 10.86 12.17   HGB 14.8 15.2 14.6   HCT 44.3 45.0 43.7   * 460* 483*     Recent Labs   Lab 06/29/19  1135 06/29/19  1445   INR 1.1  --    APTT  --  24.6     Microbiology Results (last 7 days)      ** No results found for the last 168 hours. **          Significant Diagnostics:  No new imaging

## 2019-07-01 NOTE — PT/OT/SLP EVAL
"Speech Language Pathology Evaluation  Cognitive Communication    Patient Name:  Jeffery Reyna   MRN:  [id number]  Admitting Diagnosis: Brain metastasis    Recommendations:     Recommendations:                General Recommendations:  Cognitive-linguistic therapy  Diet recommendations:  Regular, Thin   Aspiration Precautions: Standard aspiration precautions   General Precautions: Standard, aspiration, fall  Communication strategies:  none    History:     Past Medical History:   Diagnosis Date    DVT (deep venous thrombosis)     Hypertension        History reviewed. No pertinent surgical history.    Social History: Patient lives with spouse.      Prior diet: regular with thin.        Subjective     "I knew I was confused the other day"  Patient goals: home    Pain/Comfort:  · Pain Rating 1: 0/10  · Pain Rating Post-Intervention 1: 0/10    Objective:   Cognitive Status:    Pt. was oriented x3 with good recall of recent and remote temporal and general information.  Immediate/delayed verbal recall was impaired with pt. Repeating 4 digits and 4words without difficulty.    Responses to hypothetical verbal problem solving tasks were accurate and complete.  Pt. Compared and contrasted objects and generated multiple solutions to problems.  Thought organization and categorization skills were wfl with pt. Naming 15 animals given one minute when 15-20 are wnl.     Receptive Language:   Comprehension:   Pt. Responded to complex yes/no questions and multi step commands with 100% accuracy and no delays in responding.        Pragmatics:    wfl    Expressive Language:  Verbal:    Verbal language skills were wfl with no evidence of aphasia.  Pt. Expressed their thoughts coherently in conversation with no evidence of confusion or word finding deficits          Motor Speech:  wfl    Voice:   wfl    Visual-Spatial:  tba    Reading:   tba     Written Expression:   tba    Treatment: Pt. Was observed swallowing one cup of water and eating 1 " cracker.  Oral and pharyngeal phases of swallow appeared wfl with no s/s of aspiration.      Assessment:   Jeffery Reyna is a 73 y.o. male with no s/s of aspiration    Goals:   Multidisciplinary Problems     SLP Goals        Problem: SLP Goal    Goal Priority Disciplines Outcome   SLP Goal     SLP Ongoing (interventions implemented as appropriate)   Description:  Speech Language Pathology Goals  Goals expected to be met by 7/7/2019  1. Pt will tolerate regular diet with thin liquids w/o overt S/S aspiration, MOD I/met  2. Pt will participate in speech, language and cognitive assessment/met  3. Educate Pt and family on S/S aspiration and safe swallow strategies /met  4.  Assess functional reading and writing skills  5.  Participate in further assessment of cognition to determine if at baseline                       Plan:   · Patient to be seen:  3 x/week   · Plan of Care expires:  07/30/19  · Plan of Care reviewed with:  patient   · SLP Follow-Up:  Yes       Discharge recommendations:  Discharge Facility/Level of Care Needs: home health speech therapy     Time Tracking:   SLP Treatment Date:   07/01/19  Speech Start Time:  0730  Speech Stop Time:  0750     Speech Total Time (min):  20 min    Billable Minutes: eval 12; treatment swallowing function 8    Sylvia Magallanes MA, CCC-SLP  07/01/2019

## 2019-07-01 NOTE — HPI
Mr. Reyna is a 74 y/o male with significant smoking history who was transferred from OSH 6/29/19 for HA, N/V, gait imbalance and found to have multiple hemorrhagic brain lesions on CT Head. MRI Brain demonstrates 4-5 hemorrhagic lesions with the largest in Right frontal lobe and Right occipital lobe. CT C/A/P 6/30/19 demonstrated Right lung mass and multiple enhancing lesions in the liver c/w possible primary lung cancer. Biopsy is pending. His symptoms have improved with decadron. Radiation Oncology is consulted for consideration of treatment options.     I met with the patient at bedside. He reports no HA for the past several hours, no N/V, no focal weakness or imbalance, no speech difficulty, and no seizures.

## 2019-07-01 NOTE — PROGRESS NOTES
"[facility]  Neurosurgery  Progress Note    Subjective:     History of Present Illness: Jeffery Reyna is a 74 yo male with a PMH of HTN, DM who [place] [facility] [place] transfer from [facility] for 3 days of headaches that prevented him from sleeping, as well as nausea with one episode of vomiting, dizziness, and mild diarrhea with "chills" in the same time period.  He states that he had new onset of generalized headaches retroorbital, frontal and occipital to about 7-8/10 pain which are constant which began about 3 days ago. He has had no recent falls or head trauma.  He had an episode of vomiting after eating 2 days ago, and since has not had solid food and has had persistent nausea.  He also endorses dizziness which is minor and still allows him to walk during the same time period as well as Right hand numbness on review of systems. He notes no weakness and denies vision change.  He has a history of 1 pack per year smoking for 55 years, daily 6 beer per day etoh use as well as a family history of colon cancer in his brother and mother with no colonoscopy and prostate cancer to his cousin.  He has not had recent travel or other illness. He takes ASA 81 for prevention. History of a DVT in the leg many years ago to which he hs no anticoagulation or other antiplatelet medicine. Presents for work-up after several hemorrhagic brain lesions found at OSH.     Post-Op Info:  * No surgery found *         Interval History: NAEON. IR recommending outpatient biopsy. Neuro exam stable, continues c/o paresthesias in R hand.     Medications:  Continuous Infusions:  Scheduled Meds:   amLODIPine  10 mg Oral Daily    dexamethasone  4 mg Intravenous Q6H    famotidine  20 mg Oral Daily    folic acid  1 mg Oral Daily    senna-docusate 8.6-50 mg  1 tablet Oral BID    thiamine  100 mg Oral Daily     PRN Meds:acetaminophen, Dextrose 10% Bolus, Dextrose 10% Bolus, glucagon (human recombinant), glucose, " glucose, hydrALAZINE, insulin aspart U-100, magnesium oxide, magnesium oxide, ondansetron, oxyCODONE, potassium chloride 10%, potassium chloride 10%, potassium chloride 10%, potassium, sodium phosphates, potassium, sodium phosphates, potassium, sodium phosphates, sodium chloride 0.9%     Review of Systems  Objective:     Weight: 87.5 kg (193 lb)  Body mass index is 27.69 kg/m².  Vital Signs (Most Recent):  Temp: 98.1 °F (36.7 °C) (07/01/19 0407)  Pulse: 63 (07/01/19 0702)  Resp: 13 (07/01/19 0407)  BP: 128/78 (07/01/19 0407)  SpO2: 95 % (07/01/19 0407) Vital Signs (24h Range):  Temp:  [98 °F (36.7 °C)-98.6 °F (37 °C)] 98.1 °F (36.7 °C)  Pulse:  [53-86] 63  Resp:  [10-24] 13  SpO2:  [92 %-98 %] 95 %  BP: (122-154)/(74-86) 128/78                          Neurosurgery Physical Exam  AOX3, speech fluent  PERRL, face symm, tongue midline  Peripheral visual fields blurred bilaterally  GIL symm  SILT  No drift       Significant Labs:  Recent Labs   Lab 06/29/19 1135 06/30/19  0229 07/01/19  0533   * 138* 127*    136 135*   K 4.4 4.2 4.4    105 102   CO2 24 18* 23   BUN 10 10 18   CREATININE 1.0 0.8 0.9   CALCIUM 10.5 9.8 9.8   MG 2.0 1.7  --      Recent Labs   Lab 06/29/19  1135 06/30/19  0139 07/01/19  0533   WBC 10.65 10.86 12.17   HGB 14.8 15.2 14.6   HCT 44.3 45.0 43.7   * 460* 483*     Recent Labs   Lab 06/29/19  1135 06/29/19  1445   INR 1.1  --    APTT  --  24.6     Microbiology Results (last 7 days)     ** No results found for the last 168 hours. **          Significant Diagnostics:  No new imaging      Assessment/Plan:     Brain lesion  74 yo male with a PMH of HTN, DM, daily ASA, 55 pack year smoking, daily etoh use who presents with severe headache, nausea/vomiting found to have brain lesions with hemorrhagic component suspicious for metastasis.  Awake, alert and oriented x 3 with L visual field cut, R hand numbness.     --Neurochecks per protocol  --MRI Brain stealth obtained with at  least 4 distinct enhancing lesions R frontal 2.5x3cm lesion, 2.3x1.4 R occipital lesion most prominent. No hydrocephalus.   --Consult hem/onc for staging  --SBP < 160 in setting of acute hemorrhage (labetalol / hydralazine prn or cardene gtt)  --Dex 10, 4q6 for edema - recommend 2 week taper at DC (see below)   Take 4mg q 6h for 3 days, then 4mg q 8h for 3 days, then 4mg q 12h for 3 days, then 2mg q 8h for 3 days, then 2mg q 12h for 3 days, then 2mg daily for 6 days, then OFF.     Nontraumatic multiple localized intracerebral hemorrhages  73 M admitted for progressive HA found to have multiple hemorrhagic intracerebral lesions concerning for mets.  CT c/a/p with right lung lesion, multiple liver lesions.  -Continue work up per primary  - Rec against brain biopsy given hemorrhagic mets  - Biopsy of lung/liver per IR - planning for outpatient procedure.    Neurosurgery will sign off, please call with questions.         María Elena Mandujano MD  Neurosurgery  [facility]

## 2019-07-01 NOTE — ASSESSMENT & PLAN NOTE
Suspicious for metastatic lung CA given brain and liver mets  Continue dexamethasone  Not on keppra per NCC on stepdown  Oncologyconsulted and patient made aware of findings and possible need for palliative approach  Oncology advised for rad onc consult; consult placed  Palliative care consulted as well   Will discuss with IR; may need to have biopsy of liver done outpatient  Pulmonary consult was done but high risk given emphysema therefore pulm would prefer IR liver biopsy   Continue dexamethasone

## 2019-07-01 NOTE — PLAN OF CARE
07/01/19 1420   Post-Acute Status   Post-Acute Authorization Other   Other Status No Post-Acute Service Needs   Discharge Delays None known at this time

## 2019-07-01 NOTE — CONSULTS
[facility]  Physical Medicine & Rehab  Consult Note    Patient Name: Jeffery Reyna  MRN: [id number]  Admission Date: 6/29/2019  Hospital Length of Stay: 2 days  Attending Physician: Jaja Weaver MD     Inpatient consult to Physical Medicine & Rehabilitation  Consult performed by: Charlette Lino NP  Consult requested by:  Jaja Weaver MD    Reason for Consult:  assess rehabilitation needs  Consults  Subjective:     Principal Problem: Brain metastasis    HPI: Jeffery Reyna is a 73-year-old male with PMHx of HTN and DVT.  Patient presented to OSH with HA, N/V x 3 days.  OSH imaging revealed multiple hemorrhagic lesions. Transferred to [facility] on 6/29 for further evaluation and management.  Upon admission, MRI brain revealed several nonenhancing hemorrhagic lesions in the right occipital lobe, right frontal and parietal lobes, and left frontal lobe with surrounding vasogenic edema. NSGY consulted and started on steroids.  CT a/p revealed mass in the right upper lung lobe and multiple hepatic lesions. Hem/Onc, Rad Onc and Palliative consulted.     Functional History: Patient lives with significant other in a single story home with a threshold to enter.  Prior to admission, (I) with ADLs and mobility.  DME: none.    Hospital Course: 6/30/19: Passed bedside swallow evaluation.  SLP recommending regular diet and thin liquids. SLP risk of aspiration remains 2/2 lethargy and attention. PT/OT Bed mobility, sit to stand, & transfers Mod (I)-SV. Ambulated 12 ft Mod (I).  LBD SV.    Past Medical History:   Diagnosis Date    DVT (deep venous thrombosis)     Hypertension      History reviewed. No pertinent surgical history.  Review of patient's allergies indicates:  No Known Allergies    Scheduled Medications:    amLODIPine  10 mg Oral Daily    dexamethasone  4 mg Intravenous Q6H    famotidine  20 mg Oral Daily    folic acid  1 mg Oral Daily    senna-docusate 8.6-50 mg  1 tablet Oral BID    thiamine  100  "mg Oral Daily       PRN Medications: acetaminophen, Dextrose 10% Bolus, Dextrose 10% Bolus, glucagon (human recombinant), glucose, glucose, hydrALAZINE, insulin aspart U-100, magnesium oxide, magnesium oxide, ondansetron, oxyCODONE, potassium chloride 10%, potassium chloride 10%, potassium chloride 10%, potassium, sodium phosphates, potassium, sodium phosphates, potassium, sodium phosphates, sodium chloride 0.9%    Family History     Problem Relation (Age of Onset)    Cancer Mother    Diabetes Mother    Hypertension Sister    Thyroid disease Mother        Tobacco Use    Smoking status: Current Every Day Smoker     Packs/day: 1.00     Types: Cigarettes    Smokeless tobacco: Never Used   Substance and Sexual Activity    Alcohol use: Yes     Comment: daily "6 beers"    Drug use: No    Sexual activity: Not on file     Review of Systems   Constitutional: Positive for activity change. Negative for fatigue.   HENT: Negative for trouble swallowing and voice change.    Eyes: Negative for photophobia and visual disturbance.   Respiratory: Negative for cough and shortness of breath.    Cardiovascular: Negative for chest pain and palpitations.   Gastrointestinal: Negative for nausea and vomiting.   Genitourinary: Negative for difficulty urinating and flank pain.   Musculoskeletal: Positive for gait problem. Negative for arthralgias.   Skin: Negative for color change and rash.   Neurological: Negative for speech difficulty, weakness and numbness.   Psychiatric/Behavioral: Positive for confusion. Negative for agitation.     Objective:     Vital Signs (Most Recent):  Temp: 98.1 °F (36.7 °C) (07/01/19 0407)  Pulse: (!) 58 (07/01/19 1122)  Resp: 19 (07/01/19 0700)  BP: 117/72 (07/01/19 0700)  SpO2: (!) 94 % (07/01/19 0700)    Vital Signs (24h Range):  Temp:  [98 °F (36.7 °C)-98.6 °F (37 °C)] 98.1 °F (36.7 °C)  Pulse:  [53-80] 58  Resp:  [11-24] 19  SpO2:  [92 %-97 %] 94 %  BP: (117-147)/(72-86) 117/72     Body mass index is " 27.69 kg/m².    Physical Exam   Constitutional: He appears well-developed and well-nourished.   HENT:   Head: Normocephalic and atraumatic.   Eyes: Right eye exhibits no discharge. Left eye exhibits no discharge.   Neck: Neck supple.   Cardiovascular: Normal rate and intact distal pulses.   Pulmonary/Chest: Effort normal. No respiratory distress.   Abdominal: Soft. He exhibits no distension.   Musculoskeletal: He exhibits no edema or deformity.   No focal weakness    Neurological: He is alert.   Oriented to self, but not place or year    Skin: Skin is warm and dry.   Psychiatric: He has a normal mood and affect. His behavior is normal. Cognition and memory are impaired.          Diagnostic Results:   Labs: Reviewed  ECG: Reviewed  X-Ray: Reviewed  US: Reviewed  CT: Reviewed  MRI: Reviewed    Assessment/Plan:     * Brain metastasis  -MRI brain revealed several nonenhancing hemorrhagic lesions in the right occipital lobe, right frontal and parietal lobes, and left frontal lobe with surrounding vasogenic edema  - NSGY consulted and started on steroids.  -Hem/Onc, Rad Onc and Palliative consulted    Recommendations  -  Encourage mobility, OOB in chair at least 3 hours per day, and early ambulation as appropriate  -  PT/OT evaluate and treat  -  Pain management  -  Monitor for and prevent skin breakdown and pressure ulcers  · Early mobility, repositioning/weight shifting every 20-30 minutes when sitting, turn patient every 2 hours, proper mattress/overlay and chair cushioning, pressure relief/heel protector boots  -  DVT prophylaxis    -  Reviewed discharge options (IP rehab, SNF, HH therapy, and OP therapy)    Liver metastasis  -see Mass of upper lobe of right lung    Mass of upper lobe of right lung  -CT a/p revealed mass in the right upper lung lobe and multiple hepatic lesions    Recommend outpatient PT/OT per Dr. Metcalf. SLP cog eval pending. If has SLP cognition needs, may require 24/7 SV upon d/c.      Thank you  for your consult.     Charlette Lino NP  Department of Physical Medicine & Rehab  [facility]

## 2019-07-02 ENCOUNTER — APPOINTMENT (OUTPATIENT)
Dept: RADIATION THERAPY | Facility: HOSPITAL | Age: 74
End: 2019-07-02
Attending: RADIOLOGY
Payer: MEDICARE

## 2019-07-02 VITALS
HEIGHT: 70 IN | SYSTOLIC BLOOD PRESSURE: 114 MMHG | OXYGEN SATURATION: 97 % | DIASTOLIC BLOOD PRESSURE: 80 MMHG | WEIGHT: 193 LBS | BODY MASS INDEX: 27.63 KG/M2 | RESPIRATION RATE: 17 BRPM | TEMPERATURE: 98 F | HEART RATE: 61 BPM

## 2019-07-02 LAB
ALBUMIN SERPL BCP-MCNC: 3.4 G/DL (ref 3.5–5.2)
ALP SERPL-CCNC: 83 U/L (ref 55–135)
ALT SERPL W/O P-5'-P-CCNC: 39 U/L (ref 10–44)
ANION GAP SERPL CALC-SCNC: 10 MMOL/L (ref 8–16)
AST SERPL-CCNC: 24 U/L (ref 10–40)
BASOPHILS # BLD AUTO: 0.01 K/UL (ref 0–0.2)
BASOPHILS NFR BLD: 0.1 % (ref 0–1.9)
BILIRUB SERPL-MCNC: 0.7 MG/DL (ref 0.1–1)
BUN SERPL-MCNC: 22 MG/DL (ref 8–23)
CALCIUM SERPL-MCNC: 9.5 MG/DL (ref 8.7–10.5)
CHLORIDE SERPL-SCNC: 99 MMOL/L (ref 95–110)
CO2 SERPL-SCNC: 23 MMOL/L (ref 23–29)
CREAT SERPL-MCNC: 1.1 MG/DL (ref 0.5–1.4)
DIFFERENTIAL METHOD: ABNORMAL
EOSINOPHIL # BLD AUTO: 0 K/UL (ref 0–0.5)
EOSINOPHIL NFR BLD: 0 % (ref 0–8)
ERYTHROCYTE [DISTWIDTH] IN BLOOD BY AUTOMATED COUNT: 15.1 % (ref 11.5–14.5)
EST. GFR  (AFRICAN AMERICAN): >60 ML/MIN/1.73 M^2
EST. GFR  (NON AFRICAN AMERICAN): >60 ML/MIN/1.73 M^2
GLUCOSE SERPL-MCNC: 163 MG/DL (ref 70–110)
HCT VFR BLD AUTO: 42.8 % (ref 40–54)
HGB BLD-MCNC: 14.2 G/DL (ref 14–18)
IMM GRANULOCYTES # BLD AUTO: 0.08 K/UL (ref 0–0.04)
IMM GRANULOCYTES NFR BLD AUTO: 0.7 % (ref 0–0.5)
LYMPHOCYTES # BLD AUTO: 0.7 K/UL (ref 1–4.8)
LYMPHOCYTES NFR BLD: 6 % (ref 18–48)
MAGNESIUM SERPL-MCNC: 2.2 MG/DL (ref 1.6–2.6)
MCH RBC QN AUTO: 28.3 PG (ref 27–31)
MCHC RBC AUTO-ENTMCNC: 33.2 G/DL (ref 32–36)
MCV RBC AUTO: 85 FL (ref 82–98)
MONOCYTES # BLD AUTO: 0.8 K/UL (ref 0.3–1)
MONOCYTES NFR BLD: 6.5 % (ref 4–15)
NEUTROPHILS # BLD AUTO: 10.5 K/UL (ref 1.8–7.7)
NEUTROPHILS NFR BLD: 86.7 % (ref 38–73)
NRBC BLD-RTO: 0 /100 WBC
PHOSPHATE SERPL-MCNC: 3.3 MG/DL (ref 2.7–4.5)
PLATELET # BLD AUTO: 497 K/UL (ref 150–350)
PMV BLD AUTO: 9.7 FL (ref 9.2–12.9)
POCT GLUCOSE: 208 MG/DL (ref 70–110)
POTASSIUM SERPL-SCNC: 4.3 MMOL/L (ref 3.5–5.1)
PROT SERPL-MCNC: 6.7 G/DL (ref 6–8.4)
RBC # BLD AUTO: 5.01 M/UL (ref 4.6–6.2)
SODIUM SERPL-SCNC: 132 MMOL/L (ref 136–145)
WBC # BLD AUTO: 12.13 K/UL (ref 3.9–12.7)

## 2019-07-02 PROCEDURE — 85025 COMPLETE CBC W/AUTO DIFF WBC: CPT

## 2019-07-02 PROCEDURE — 77290 THER RAD SIMULAJ FIELD CPLX: CPT | Mod: TC | Performed by: RADIOLOGY

## 2019-07-02 PROCEDURE — 77263 PR  RADIATION THERAPY PLAN COMPLEX: ICD-10-PCS | Mod: ,,, | Performed by: RADIOLOGY

## 2019-07-02 PROCEDURE — 25000003 PHARM REV CODE 250: Performed by: NURSE PRACTITIONER

## 2019-07-02 PROCEDURE — 77263 THER RADIOLOGY TX PLNG CPLX: CPT | Mod: ,,, | Performed by: RADIOLOGY

## 2019-07-02 PROCEDURE — 77334 RADIATION TREATMENT AID(S): CPT | Mod: TC | Performed by: RADIOLOGY

## 2019-07-02 PROCEDURE — 83735 ASSAY OF MAGNESIUM: CPT

## 2019-07-02 PROCEDURE — 84100 ASSAY OF PHOSPHORUS: CPT

## 2019-07-02 PROCEDURE — 63600175 PHARM REV CODE 636 W HCPCS: Performed by: NURSE PRACTITIONER

## 2019-07-02 PROCEDURE — 36415 COLL VENOUS BLD VENIPUNCTURE: CPT

## 2019-07-02 PROCEDURE — 77290 THER RAD SIMULAJ FIELD CPLX: CPT | Mod: 26,,, | Performed by: RADIOLOGY

## 2019-07-02 PROCEDURE — 92507 TX SP LANG VOICE COMM INDIV: CPT

## 2019-07-02 PROCEDURE — 99238 PR HOSPITAL DISCHARGE DAY,<30 MIN: ICD-10-PCS | Mod: ,,, | Performed by: HOSPITALIST

## 2019-07-02 PROCEDURE — 77014 HC CT GUIDANCE RADIATION THERAPY FLDS PLACEMENT: CPT | Mod: TC | Performed by: RADIOLOGY

## 2019-07-02 PROCEDURE — 80053 COMPREHEN METABOLIC PANEL: CPT

## 2019-07-02 PROCEDURE — 77334 PR  RADN TREATMENT AID(S) COMPLX: ICD-10-PCS | Mod: 26,,, | Performed by: RADIOLOGY

## 2019-07-02 PROCEDURE — 77290 PR  SET RADN THERAPY FIELD COMPLEX: ICD-10-PCS | Mod: 26,,, | Performed by: RADIOLOGY

## 2019-07-02 PROCEDURE — 99238 HOSP IP/OBS DSCHRG MGMT 30/<: CPT | Mod: ,,, | Performed by: HOSPITALIST

## 2019-07-02 PROCEDURE — 77334 RADIATION TREATMENT AID(S): CPT | Mod: 26,,, | Performed by: RADIOLOGY

## 2019-07-02 RX ORDER — ACETAMINOPHEN 325 MG/1
650 TABLET ORAL EVERY 6 HOURS PRN
Refills: 0 | COMMUNITY
Start: 2019-07-02

## 2019-07-02 RX ORDER — FAMOTIDINE 20 MG/1
20 TABLET, FILM COATED ORAL DAILY
Qty: 30 TABLET | Refills: 11 | Status: SHIPPED | OUTPATIENT
Start: 2019-07-03 | End: 2020-07-02

## 2019-07-02 RX ORDER — DEXAMETHASONE 2 MG/1
2 TABLET ORAL 2 TIMES DAILY WITH MEALS
Qty: 66 TABLET | Refills: 0 | Status: ON HOLD | OUTPATIENT
Start: 2019-07-02 | End: 2019-07-10 | Stop reason: HOSPADM

## 2019-07-02 RX ORDER — OXYCODONE HYDROCHLORIDE 5 MG/1
5 TABLET ORAL EVERY 6 HOURS PRN
Qty: 40 TABLET | Refills: 0 | Status: SHIPPED | OUTPATIENT
Start: 2019-07-02

## 2019-07-02 RX ORDER — ONDANSETRON 4 MG/1
4 TABLET, ORALLY DISINTEGRATING ORAL EVERY 6 HOURS PRN
Qty: 30 TABLET | Refills: 3 | Status: SHIPPED | OUTPATIENT
Start: 2019-07-02

## 2019-07-02 RX ADMIN — DEXAMETHASONE SODIUM PHOSPHATE 4 MG: 4 INJECTION, SOLUTION INTRAMUSCULAR; INTRAVENOUS at 05:07

## 2019-07-02 RX ADMIN — AMLODIPINE BESYLATE 10 MG: 10 TABLET ORAL at 08:07

## 2019-07-02 RX ADMIN — SENNOSIDES,DOCUSATE SODIUM 1 TABLET: 8.6; 5 TABLET, FILM COATED ORAL at 08:07

## 2019-07-02 RX ADMIN — Medication 100 MG: at 08:07

## 2019-07-02 RX ADMIN — DEXAMETHASONE SODIUM PHOSPHATE 4 MG: 4 INJECTION, SOLUTION INTRAMUSCULAR; INTRAVENOUS at 11:07

## 2019-07-02 RX ADMIN — FAMOTIDINE 20 MG: 20 TABLET, FILM COATED ORAL at 08:07

## 2019-07-02 RX ADMIN — FOLIC ACID 1 MG: 1 TABLET ORAL at 08:07

## 2019-07-02 NOTE — SUBJECTIVE & OBJECTIVE
"Interval History:   Past Medical History:   Diagnosis Date    DVT (deep venous thrombosis)     Hypertension        History reviewed. No pertinent surgical history.    Review of patient's allergies indicates:  No Known Allergies    Medications:  Continuous Infusions:  Scheduled Meds:   amLODIPine  10 mg Oral Daily    dexamethasone  4 mg Intravenous Q6H    famotidine  20 mg Oral Daily    folic acid  1 mg Oral Daily    senna-docusate 8.6-50 mg  1 tablet Oral BID    thiamine  100 mg Oral Daily     PRN Meds:acetaminophen, Dextrose 10% Bolus, Dextrose 10% Bolus, glucagon (human recombinant), glucose, glucose, hydrALAZINE, insulin aspart U-100, ondansetron, oxyCODONE, sodium chloride 0.9%    Family History     Problem Relation (Age of Onset)    Cancer Mother    Diabetes Mother    Hypertension Sister    Thyroid disease Mother        Tobacco Use    Smoking status: Current Every Day Smoker     Packs/day: 1.00     Types: Cigarettes    Smokeless tobacco: Never Used   Substance and Sexual Activity    Alcohol use: Yes     Comment: daily "6 beers"    Drug use: No    Sexual activity: Not on file       Review of Systems   Gastrointestinal: Positive for nausea and vomiting.   Neurological: Positive for weakness and headaches.     Objective:     Vital Signs (Most Recent):  Temp: 98.2 °F (36.8 °C) (07/01/19 1558)  Pulse: 71 (07/01/19 1700)  Resp: 16 (07/01/19 0900)  BP: 135/82 (07/01/19 0900)  SpO2: (!) 94 % (07/01/19 1700) Vital Signs (24h Range):  Temp:  [98.1 °F (36.7 °C)-98.6 °F (37 °C)] 98.2 °F (36.8 °C)  Pulse:  [53-71] 71  Resp:  [11-19] 16  SpO2:  [94 %-97 %] 94 %  BP: (117-135)/(72-82) 135/82     Weight: 87.5 kg (193 lb)  Body mass index is 27.69 kg/m².    Review of Symptoms  Symptom Assessment (ESAS 0-10 scale)   ESAS 0 1 2 3 4 5 6 7 8 9 10   Pain X             Dyspnea X             Anxiety              Nausea   X           Depression               Anorexia              Fatigue              Insomnia            "   Restlessness               Agitation              CAM / Delirium __ --  ___+   Constipation     __ --  ___+   Diarrhea           __ --  ___+  Bowel Management Plan (BMP): No    Comments: complains of pain - mild headache 2/10     Pain Assessment:    OME in 24 hours: 0    Performance Status: 50    ECOG Performance Status Grade: 1 - Ambulates, capable of light work    Physical Exam   Constitutional: He is oriented to person, place, and time.   Cardiovascular: Normal rate, regular rhythm and normal heart sounds.   Pulmonary/Chest: Effort normal and breath sounds normal. No respiratory distress.   Abdominal: Soft. Bowel sounds are normal.   Musculoskeletal: Normal range of motion.   Neurological: He is alert and oriented to person, place, and time.   Skin: Skin is warm and dry.   Psychiatric: He has a normal mood and affect. His behavior is normal. Judgment and thought content normal.   Nursing note and vitals reviewed.      Significant Labs: All pertinent labs within the past 24 hours have been reviewed.  CBC:   Recent Labs   Lab 07/01/19  0533   WBC 12.17   HGB 14.6   HCT 43.7   MCV 85   *     BMP:  Recent Labs   Lab 07/01/19  0533   *   *   K 4.4      CO2 23   BUN 18   CREATININE 0.9   CALCIUM 9.8     LFT:  Lab Results   Component Value Date    AST 21 07/01/2019    ALKPHOS 95 07/01/2019    BILITOT 0.7 07/01/2019     Albumin:   Albumin   Date Value Ref Range Status   07/01/2019 3.5 3.5 - 5.2 g/dL Final     Protein:   Total Protein   Date Value Ref Range Status   07/01/2019 6.9 6.0 - 8.4 g/dL Final     Lactic acid:   No results found for: LACTATE    Significant Imaging: I have reviewed all pertinent imaging results/findings within the past 24 hours.    Advance Care Planning   Advanced Directives::  Living Will: Yes. Copy on chart: No  LaPOST: No  Do Not Resuscitate Status: Yes  Medical Power of : yes, spouse Davis Castro   [phone]  Decision-Making Capacity: Patient answered  questions, Family answered questions       Living Arrangements: Lives with spouse    Psychosocial/Cultural: , lives with wife of ten years, blended family 4 adult children, several grandchildren.  Worked as  A  prior to senior living.  Enjoys time with family     Spiritual:     F- Dorcas and Belief: [employer], raised Latter-day   I - Importance: does not attend services regularly   .  C - Community:     A - Address in Care: not interested in  visits.

## 2019-07-02 NOTE — ASSESSMENT & PLAN NOTE
Palliative medicine consulted for goals of care and discussed with Dr. Weaver.  Palliative Medicine APRN met with patient and wife Davis at bedside.  Palliative medicine introduced to patient and family.     Impression Mr. Reyna is a 72 yo gentleman admitted with nausea, vomiting and headaches.  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  Unable to have biopsy of brain lesions secondary to  Nature of the lesionsand liver biopsy pending. Oncology consulted with opinion that this is incurable.  Recommendations for radiation oncology and palliative medicine. He is awake, alert and oriented to person, place, time and situation.  No complaints of pain or discomfort at this time.  No shortness of breath or acute distress noted.      Advanced Care Planning  - no advanced directives received.    - Advanced care planning education provided. -  [facility] how to start the conversation given.  - Following this discussion able to complete advanced directive documents:  General living will and hpoa completed.  Wife Davis Castro and sister Rowena Bonner identified as surrogate decision makers  - Mr. Reyna able to make own decisions at this time.  Appears to have limited recall of conversations about clinical condition.  Able to state understanding after prompts given per wife.   - Resuscitation status: full code per primary team.  Mr. Reyna states not wanting to change this order until he learns more from the radiation oncology team.     Goals of Care:   - Mr. Reyna and wife are still processing the information they have received.  Wife appears to have better understanding at this time.  - Although he has completed general living will he is reluctant to consent to DNR order at this time.  He wishes to hear what the radiation oncology team can offer.   - Wife understands the cancer cannnot be cured and both are interested in learning what therapies if any will provide them with relief from headache symptoms and offer  them more time. Appears patient and family may be amenable to radiation therapy   - both patient and wife state knowing the diagnosis is important.   - Wife states they are interested in knowing the prognosis time line in order to properly plan  -  Palliative medicine explained amount of time cannot be determined with any certainty and could be described as weeks to months.    - Wife introduced hospice to conversation, asking if it would be appropriate to consider hospice.  Palliative medicine explained current condition is appropriate for hospice.   - Reinforced previous knowledge of hospice and general hospice resources provided.    - Patient and wife would like more time to consider their options. Assured patient and family a decision for hospice does not have to be made today.     Plan/ Recommendations  - advanced directives received.  Original given to patient and copy obtained from EMR  -  Remains full code - Reluctant to have DNR order - would benefit from additional information in relationship to diagnosis and prognosis  - If able Mr. Reyna and wife would like to have liver biopsy while in hospital   - If patient proceeds with radiation therapy, recommend follow up with Dr. Bang in the palliative medicine/oncology clinic.  Mimi Beck is the  for scheduling.   - In addition patient may benefit from home health with [facility] and or outpatient palliative medicine.  - Palliative medicine will continue to follow for further development of goals of   - emotional support.

## 2019-07-02 NOTE — PLAN OF CARE
Problem: SLP Goal  Goal: SLP Goal  Speech Language Pathology Goals  Goals expected to be met by 7/7/2019  1. Pt will tolerate regular diet with thin liquids w/o overt S/S aspiration, MOD I/met  2. Pt will participate in speech, language and cognitive assessment/met  3. Educate Pt and family on S/S aspiration and safe swallow strategies /met  4.  Assess functional reading and writing skills. Goal met 7/2. Pt is unable to read premorbidly.  Pt able to write his name with fair accuracy.  5.  Participate in further assessment of cognition to determine if at baseline      Outcome: Ongoing (interventions implemented as appropriate)  Pt to be discharged today.  Would benefit from a  SLP eval to assist with determining level of supervision needed for safety given cognitive deficits and if pt is at/near baseline.   HERMAN Shaikh, CCC-SLP  Speech Language Pathologist  [phone]  7/2/2019

## 2019-07-02 NOTE — PLAN OF CARE
Problem: Adult Inpatient Plan of Care  Goal: Plan of Care Review  Pt OAX4, medication education reinforced. Remained free from acute incident this shift. Seizure precautions maintained. Verbalized C/O headache unrelieved by tylenol. PRN Hydrocodone effective. Will continue to monitor and continue POC

## 2019-07-02 NOTE — PT/OT/SLP PROGRESS
"Speech Language Pathology Treatment    Patient Name:  Jeffery Reyna   MRN:  [id number]  Admitting Diagnosis: Mass of upper lobe of right lung    Recommendations:                 General Recommendations:  Cognitive-linguistic therapy  Diet recommendations:  Regular, Liquid Diet Level: Thin   Aspiration Precautions: HOB to 90 degrees, Monitor for s/s of aspiration and Standard aspiration precautions   General Precautions: Standard, aspiration, fall  Communication strategies:  go to room if call light pushed    Subjective     "He may get a little confused when he first wakes up." pt's wife stated of recent changes in pt's cognition.     Pain/Comfort:  · Pain Rating 1: 0/10    Objective:     Has the patient been evaluated by SLP for swallowing?   Yes  Keep patient NPO? No   Current Respiratory Status: room air      Pt seen for ongoing assessment of reading, writing, and cognition.  Pt's wife present and indicated pt is discharging at some point today. She states that she is retired and will be home with the pt upon discharge.  Pt's wife indicated pt has recently demonstrated changes in cognition, including confusion when he wakes up.  Assessment of reading abilities was deferred as wife indicated pt was unable to read PTA.  Pt was able to use his left/dominant hand to write his first and last name, but his last name was not spelled correctly. Pt also richelle the face of a clock for assessment of visual spatial abilities.  Pt only wrote the numbers 1-10, which were not placed correctly, and with limited awareness of mistakes.  Pt was oriented to place and year ind'ly and to month given supervision. Education provided to pt and wife regarding role of SLP, ongoing cognitive assessment, and recs for  SLP evaluation to assist with ensuring pt is safe in his home environment and determine if pt is at/near cognitive baseline.     Assessment:     Jeffery Reyna is a 73 y.o. male with an SLP diagnosis of Cognitive-Linguistic " Impairment.      Goals:   Multidisciplinary Problems     SLP Goals        Problem: SLP Goal    Goal Priority Disciplines Outcome   SLP Goal     SLP Ongoing (interventions implemented as appropriate)   Description:  Speech Language Pathology Goals  Goals expected to be met by 7/7/2019  1. Pt will tolerate regular diet with thin liquids w/o overt S/S aspiration, MOD I/met  2. Pt will participate in speech, language and cognitive assessment/met  3. Educate Pt and family on S/S aspiration and safe swallow strategies /met  4.  Assess functional reading and writing skills. Goal met 7/2. Pt is unable to read premorbidly.  Pt able to write his name with fair accuracy.  5.  Participate in further assessment of cognition to determine if at baseline                        Plan:     · Patient to be seen:  3 x/week   · Plan of Care expires:  07/30/19  · Plan of Care reviewed with:  patient, spouse   · SLP Follow-Up:  Yes       Discharge recommendations:  home health speech therapy     Time Tracking:     SLP Treatment Date:   07/02/19  Speech Start Time:  1133  Speech Stop Time:  1143     Speech Total Time (min):  10 min    Billable Minutes: Speech Therapy Individual 10    HERMAN Shaikh, CCC-SLP  07/02/2019     HERMAN Shaikh, CCC-SLP  Speech Language Pathologist  [phone]  7/2/2019

## 2019-07-02 NOTE — PLAN OF CARE
07/02/19 1421   Final Note   Assessment Type Final Discharge Note   Anticipated Discharge Disposition Home   What phone number can be called within the next 1-3 days to see how you are doing after discharge? [phone]   Hospital Follow Up  Appt(s) scheduled?   (Ambulatory referrals sent to Hem/Onc, IR, and Palliative Care.)   Right Care Referral Info   Post Acute Recommendation No Care

## 2019-07-02 NOTE — CONSULTS
[facility]  Palliative Medicine  Consult Note    Patient Name: Jeffery Reyna  MRN: [id number]  Admission Date: 6/29/2019  Hospital Length of Stay: 2 days  Code Status: Full Code   Attending Provider: Jaja Weaver MD  Consulting Provider: OSCAR Lyons  Primary Care Physician: Flori Harmon MD  Principal Problem:Mass of upper lobe of right lung    Patient information was obtained from patient, spouse/SO, past medical records and ER records.      Consults  Assessment/Plan:     Palliative care encounter  Palliative medicine consulted for goals of care and discussed with Dr. Weaver.  Palliative Medicine APRN met with patient and wife Davis at bedside.  Palliative medicine introduced to patient and family.     Impression Mr. Reyna is a 72 yo gentleman stepped down from critical care,  admitted with nausea, vomiting and headaches.  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  Unable to have biopsy of brain lesions secondary to  Nature of the lesionsand liver biopsy pending. Oncology consulted with opinion that this is incurable.  Recommendations for radiation oncology and palliative medicine. He is awake, alert and oriented to person, place, time and situation.  No complaints of pain or discomfort at this time.  No shortness of breath or acute distress noted.      Advanced Care Planning Advance Care Planning     - no advanced directives received.    - Advanced care planning education provided. -  [facility] how to start the conversation given.  - Following this discussion able to complete advanced directive documents:  General living will and hpoa completed.  Wife Davis Castro and sister Rowena Bonner identified as surrogate decision makers  - Mr. Reyna able to make own decisions at this time.  Appears to have limited recall of conversations about clinical condition.  Able to state understanding after prompts given per wife.   - Resuscitation status: full code per primary team.   Mr. Reyna states not wanting to change this order until he learns more from the radiation oncology team.            Goals of Care:   - Mr. Reyna and wife are still processing the information they have received.  Wife appears to have better understanding at this time.  - Although he has completed general living will he is reluctant to consent to DNR order at this time.  He wishes to hear what the radiation oncology team can offer.   - Wife understands the cancer cannnot be cured and both are interested in learning what therapies if any will provide them with relief from headache symptoms and offer them more time. Appears patient and family may be amenable to radiation therapy   - both patient and wife state knowing the diagnosis is important.   - Wife states they are interested in knowing the prognosis time line in order to properly plan  -  Palliative medicine explained amount of time cannot be determined with any certainty and could be described as weeks to months.    - Wife introduced hospice to conversation, asking if it would be appropriate to consider hospice.  Palliative medicine explained current condition is appropriate for hospice.   - Reinforced previous knowledge of hospice and general hospice resources provided.    - Patient and wife would like more time to consider their options. Assured patient and family a decision for hospice does not have to be made today.     Plan/ Recommendations  - advanced directives received.  Original given to patient and copy obtained from EMR  -  Remains full code - Reluctant to have DNR order - would benefit from additional information in relationship to diagnosis and prognosis  - If able Mr. Reyna and wife would like to have liver biopsy while in hospital   - If patient proceeds with radiation therapy, recommend follow up with Dr. Bang in the palliative medicine/oncology clinic.  Mimi Beck is the  for scheduling.   - In addition patient may benefit  from home health with [facility] and or outpatient palliative medicine.  - Palliative medicine will continue to follow for further development of goals of   - emotional support.               Thank you for your consult. I will follow-up with patient. Please contact us if you have any additional questions.    Subjective:     HPI:   Mr. Reyna is a  72 yo gentleman who presents to [facility] from OSH with PMH of HTN and DVT. C/o having headache for three days and associated with nausea and vomiting.   OSH completed  CT of head indicating multiple hemorrhagic lesions on CTH.  MRI Brain w and wo shows several nonenhancing hemorrhagic lesions in the right occipital lobe, right frontal and parietal lobes, and left frontal lobe with surrounding vasogenic edema.    No history of cancer and patient has long term history of smoking and alcohol use. Neurosurgery and oncology consulted.  Neuro sx recommends no biopsy of brain lesions secondary to  Hemorrhagic nature of the lesions.  Oncology has discussed imaging is consistent with metastatic lung cancer to liver and brain.  Has discussed the incurable nature of the disease and recommended cx for radiation oncology and palliative care.     Palliative medicine consulted for goals of care and advanced care planning       Hospital Course:  No notes on file    Interval History:   Past Medical History:   Diagnosis Date    DVT (deep venous thrombosis)     Hypertension        History reviewed. No pertinent surgical history.    Review of patient's allergies indicates:  No Known Allergies    Medications:  Continuous Infusions:  Scheduled Meds:   amLODIPine  10 mg Oral Daily    dexamethasone  4 mg Intravenous Q6H    famotidine  20 mg Oral Daily    folic acid  1 mg Oral Daily    senna-docusate 8.6-50 mg  1 tablet Oral BID    thiamine  100 mg Oral Daily     PRN Meds:acetaminophen, Dextrose 10% Bolus, Dextrose 10% Bolus, glucagon (human recombinant), glucose, glucose, hydrALAZINE, insulin aspart  "U-100, ondansetron, oxyCODONE, sodium chloride 0.9%    Family History     Problem Relation (Age of Onset)    Cancer Mother    Diabetes Mother    Hypertension Sister    Thyroid disease Mother        Tobacco Use    Smoking status: Current Every Day Smoker     Packs/day: 1.00     Types: Cigarettes    Smokeless tobacco: Never Used   Substance and Sexual Activity    Alcohol use: Yes     Comment: daily "6 beers"    Drug use: No    Sexual activity: Not on file       Review of Systems   Gastrointestinal: Positive for nausea and vomiting.   Neurological: Positive for weakness and headaches.     Objective:     Vital Signs (Most Recent):  Temp: 98.2 °F (36.8 °C) (07/01/19 1558)  Pulse: 71 (07/01/19 1700)  Resp: 16 (07/01/19 0900)  BP: 135/82 (07/01/19 0900)  SpO2: (!) 94 % (07/01/19 1700) Vital Signs (24h Range):  Temp:  [98.1 °F (36.7 °C)-98.6 °F (37 °C)] 98.2 °F (36.8 °C)  Pulse:  [53-71] 71  Resp:  [11-19] 16  SpO2:  [94 %-97 %] 94 %  BP: (117-135)/(72-82) 135/82     Weight: 87.5 kg (193 lb)  Body mass index is 27.69 kg/m².    Review of Symptoms  Symptom Assessment (ESAS 0-10 scale)   ESAS 0 1 2 3 4 5 6 7 8 9 10   Pain X             Dyspnea X             Anxiety              Nausea   X           Depression               Anorexia              Fatigue              Insomnia              Restlessness               Agitation              CAM / Delirium __ --  ___+   Constipation     __ --  ___+   Diarrhea           __ --  ___+  Bowel Management Plan (BMP): No    Comments: complains of pain - mild headache 2/10     Pain Assessment:    OME in 24 hours: 0    Performance Status: 50    ECOG Performance Status Grade: 1 - Ambulates, capable of light work    Physical Exam   Constitutional: He is oriented to person, place, and time.   Cardiovascular: Normal rate, regular rhythm and normal heart sounds.   Pulmonary/Chest: Effort normal and breath sounds normal. No respiratory distress.   Abdominal: Soft. Bowel sounds are normal. "   Musculoskeletal: Normal range of motion.   Neurological: He is alert and oriented to person, place, and time.   Skin: Skin is warm and dry.   Psychiatric: He has a normal mood and affect. His behavior is normal. Judgment and thought content normal.   Nursing note and vitals reviewed.      Significant Labs: All pertinent labs within the past 24 hours have been reviewed.  CBC:   Recent Labs   Lab 07/01/19  0533   WBC 12.17   HGB 14.6   HCT 43.7   MCV 85   *     BMP:  Recent Labs   Lab 07/01/19  0533   *   *   K 4.4      CO2 23   BUN 18   CREATININE 0.9   CALCIUM 9.8     LFT:  Lab Results   Component Value Date    AST 21 07/01/2019    ALKPHOS 95 07/01/2019    BILITOT 0.7 07/01/2019     Albumin:   Albumin   Date Value Ref Range Status   07/01/2019 3.5 3.5 - 5.2 g/dL Final     Protein:   Total Protein   Date Value Ref Range Status   07/01/2019 6.9 6.0 - 8.4 g/dL Final     Lactic acid:   No results found for: LACTATE    Significant Imaging: I have reviewed all pertinent imaging results/findings within the past 24 hours.    Advance Care Planning   Advanced Directives::  Living Will: Yes. Copy on chart: No  LaPOST: No  Do Not Resuscitate Status: Yes  Medical Power of : yes, spouse Davis Castro   [phone]  Decision-Making Capacity: Patient answered questions, Family answered questions       Living Arrangements: Lives with spouse    Psychosocial/Cultural: , lives with wife of ten years, blended family 4 adult children, several grandchildren.  Worked as  A  prior to residential.  Enjoys time with family     Spiritual:     F- Dorcas and Belief: [employer]ism, raised Orthodox   I - Importance: does not attend services regularly   .  C - Community:     A - Address in Care: not interested in  visits.       > 50% of 70  min visit spent in chart review, face to face discussion of goals of care,  symptom assessment, coordination of care and emotional  support.    AMY Bernardo, ACNS-BC, OCN   Palliative Medicine  [facility]

## 2019-07-03 ENCOUNTER — TELEPHONE (OUTPATIENT)
Dept: HEMATOLOGY/ONCOLOGY | Facility: CLINIC | Age: 74
End: 2019-07-03

## 2019-07-03 DIAGNOSIS — C78.7 LIVER METASTASIS: Primary | ICD-10-CM

## 2019-07-03 DIAGNOSIS — R91.8 MASS OF UPPER LOBE OF RIGHT LUNG: Primary | ICD-10-CM

## 2019-07-03 DIAGNOSIS — C79.31 BRAIN METASTASIS: Primary | ICD-10-CM

## 2019-07-03 PROCEDURE — 77334 RADIATION TREATMENT AID(S): CPT | Mod: 26,,, | Performed by: RADIOLOGY

## 2019-07-03 PROCEDURE — 77300 PR RADIATION THERAPY,DOSIMETRY PLAN: ICD-10-PCS | Mod: 26,,, | Performed by: RADIOLOGY

## 2019-07-03 PROCEDURE — 77334 RADIATION TREATMENT AID(S): CPT | Mod: TC | Performed by: RADIOLOGY

## 2019-07-03 PROCEDURE — 77295 PR 3D RADIOTHERAPY PLAN: ICD-10-PCS | Mod: 26,,, | Performed by: RADIOLOGY

## 2019-07-03 PROCEDURE — 77300 RADIATION THERAPY DOSE PLAN: CPT | Mod: TC | Performed by: RADIOLOGY

## 2019-07-03 PROCEDURE — 77300 RADIATION THERAPY DOSE PLAN: CPT | Mod: 26,,, | Performed by: RADIOLOGY

## 2019-07-03 PROCEDURE — 77295 3-D RADIOTHERAPY PLAN: CPT | Mod: TC | Performed by: RADIOLOGY

## 2019-07-03 PROCEDURE — 77334 PR  RADN TREATMENT AID(S) COMPLX: ICD-10-PCS | Mod: 26,,, | Performed by: RADIOLOGY

## 2019-07-03 PROCEDURE — 77295 3-D RADIOTHERAPY PLAN: CPT | Mod: 26,,, | Performed by: RADIOLOGY

## 2019-07-03 NOTE — DISCHARGE SUMMARY
[facility] Hospital Medicine  Discharge Summary      Patient Name: Jeffery Reyna  MRN: [id number]  Admission Date: 6/29/2019  Hospital Length of Stay: 3 days  Discharge Date and Time:  07/03/2019 9:28 AM  Attending Physician: No att. providers found   Discharging Provider: Jaja Weaver MD  Primary Care Provider: Flori Harmon MD  Hospital Medicine Team: [facility]  Jaja Weaver MD    HPI:   No notes on file    * No surgery found *      Hospital Course:   The patient was transferred to [facility] from an outside facility after being found to have numerous hemorrhagic brain lesions. He presented with headaches and N/V as his presenting symptoms. He was found to have a right lung lobe lesion suspicious for lung CA with brain and liver mets. He was seen by NS but no surgical intervention. He was started on dexamethasone but not no keppra. He will continue with a dexamethasone taper upon dc. He will get IR lung biopsy as an outpatient. Pulmonology was consulted but they felt that the biopsy would be safely done as a liver biopsy given emphysematous changes to the lung. Oncology was consulted as was rad onc and he will start radiation therapy on 7/5. He will follow up with oncology after his lung biopsy. The patient and his wife were aware of all of the above. He was discharged in stable condition.       Consults:   Consults (From admission, onward)        Status Ordering Provider     Inpatient consult to Hematology/Oncology  Once     Provider:  (Not yet assigned)    Completed GRISELDA OTNEY     Inpatient consult to Interventional Radiology  Once     Provider:  (Not yet assigned)    Completed ROBERT VELAZQUEZ     Inpatient consult to Palliative Care  Once     Provider:  (Not yet assigned)    JAJA Freitas     Inpatient consult to Physical Medicine Rehab  Once     Provider:  (Not yet assigned)    Completed YAHIR MCBRIDE     Inpatient consult to Pulmonology  Once      Provider:  (Not yet assigned)    Completed ROBERT VELAZQUEZ     Inpatient consult to Radiation Oncology  Once     Provider:  (Not yet assigned)    Completed OLINDA SMITH     Inpatient consult to Registered Dietitian/Nutritionist  Once     Provider:  (Not yet assigned)    Completed YAHIR MCBRIDE          * Mass of upper lobe of right lung  Suspicious for metastatic lung CA given brain and liver mets  Continue dexamethasone  Not on keppra per NCC on stepdown  Oncologyconsulted and patient made aware of findings and possible need for palliative approach  Oncology advised for rad onc consult; consult placed  Palliative care consulted as well   Will discuss with IR; may need to have biopsy of liver done outpatient  Pulmonary consult was done but high risk given emphysema therefore pulm would prefer IR liver biopsy   Continue dexamethasone        Liver metastasis  As above       Nontraumatic multiple localized intracerebral hemorrhages  Secondary to brain mets  Not on anticoagulation       Vasogenic brain edema  As above       Brain metastasis  As above       Essential hypertension  Controlled on amlodipine      Tobacco use disorder-resolved as of 7/3/2019          Final Active Diagnoses:    Diagnosis Date Noted POA    PRINCIPAL PROBLEM:  Mass of upper lobe of right lung [R91.8] 06/30/2019 Yes    Liver metastasis [C78.7] 06/30/2019 Yes    Brain metastasis [C79.31] 06/29/2019 Yes    Vasogenic brain edema [G93.6] 06/29/2019 Yes    Nontraumatic multiple localized intracerebral hemorrhages [I61.6] 06/29/2019 Yes    Essential hypertension [I10] 11/04/2013 Yes      Problems Resolved During this Admission:    Diagnosis Date Noted Date Resolved POA    Alcohol abuse, daily use [F10.10] 06/29/2019 07/01/2019 Yes    Brain lesion [G93.9] 06/29/2019 07/01/2019 Unknown    History of DVT (deep vein thrombosis) [Z86.718] 06/29/2019 07/01/2019 Not Applicable    Tobacco use disorder [F17.200] 11/04/2013  07/03/2019 Yes       Discharged Condition: good    Disposition: Home or Self Care    Follow Up:    Patient Instructions:      Ambulatory Referral to Hematology / Oncology   Referral Priority: Routine Referral Type: Consultation   Referral Reason: Specialty Services Required   Requested Specialty: Hematology and Oncology   Number of Visits Requested: 1     Ambulatory Referral to Interventional Radiology   Referral Priority: Routine Referral Type: Consultation   Referral Reason: Specialty Services Required   Requested Specialty: Interventional Radiology   Number of Visits Requested: 1     Ambulatory Referral to Palliative Care   Referral Priority: Routine Referral Type: Consultation   Requested Specialty: Hospice and Palliative Medicine   Number of Visits Requested: 1       Significant Diagnostic Studies: see imaging    Pending Diagnostic Studies:     None         Medications:  Reconciled Home Medications:      Medication List      START taking these medications    acetaminophen 325 MG tablet  Commonly known as:  TYLENOL  Take 2 tablets (650 mg total) by mouth every 6 (six) hours as needed (temp greater than 99.5 F).     dexAMETHasone 2 MG tablet  Commonly known as:  DECADRON  Take 1 tablet (2 mg total) by mouth 2 (two) times daily with meals. Take 4mg q 6h x 3 days, then 4mg q 8h x 3 days, then 4mg q 12h x 3 days, then 2mg q 8h x 3 days, then 2mg q 12h x 3 days, then 2mg daily x 6 days, then stop. for 10 days     famotidine 20 MG tablet  Commonly known as:  PEPCID  Take 1 tablet (20 mg total) by mouth once daily.     ondansetron 4 MG Tbdl  Commonly known as:  ZOFRAN-ODT  Take 1 tablet (4 mg total) by mouth every 6 (six) hours as needed (nausea).     oxyCODONE 5 MG immediate release tablet  Commonly known as:  ROXICODONE  Take 1 tablet (5 mg total) by mouth every 6 (six) hours as needed.        CONTINUE taking these medications    amLODIPine 10 MG tablet  Commonly known as:  NORVASC  Take 1 tablet (10 mg total) by  mouth once daily.        STOP taking these medications    aspirin 81 MG EC tablet  Commonly known as:  ECOTRIN            Indwelling Lines/Drains at time of discharge:   Lines/Drains/Airways          None           Jaja Weaver MD  Department of Hospital Medicine  [facility]

## 2019-07-03 NOTE — HOSPITAL COURSE
The patient was transferred to [facility] from an outside facility after being found to have numerous hemorrhagic brain lesions. He presented with headaches and N/V as his presenting symptoms. He was found to have a right lung lobe lesion suspicious for lung CA with brain and liver mets. He was seen by NS but no surgical intervention. He was started on dexamethasone but not no keppra. He will continue with a dexamethasone taper upon dc. He will get IR lung biopsy as an outpatient. Pulmonology was consulted but they felt that the biopsy would be safely done as a liver biopsy given emphysematous changes to the lung. Oncology was consulted as was rad onc and he will start radiation therapy on 7/5. He will follow up with oncology after his lung biopsy. The patient and his wife were aware of all of the above. He was discharged in stable condition.

## 2019-07-05 ENCOUNTER — DOCUMENTATION ONLY (OUTPATIENT)
Dept: RADIATION ONCOLOGY | Facility: CLINIC | Age: 74
End: 2019-07-05

## 2019-07-05 ENCOUNTER — PATIENT OUTREACH (OUTPATIENT)
Dept: ADMINISTRATIVE | Facility: CLINIC | Age: 74
End: 2019-07-05

## 2019-07-05 PROCEDURE — 77417 THER RADIOLOGY PORT IMAGE(S): CPT | Performed by: RADIOLOGY

## 2019-07-05 PROCEDURE — 77412 RADIATION TX DELIVERY LVL 3: CPT | Performed by: RADIOLOGY

## 2019-07-05 NOTE — PROGRESS NOTES
Attempted to contact patient with the number ion file and was notified the number ([phone]) is no longer the patient number.        Please advise.      Thank you,  Rosendo

## 2019-07-08 ENCOUNTER — DOCUMENTATION ONLY (OUTPATIENT)
Dept: RADIATION ONCOLOGY | Facility: CLINIC | Age: 74
End: 2019-07-08

## 2019-07-08 PROCEDURE — 77412 RADIATION TX DELIVERY LVL 3: CPT | Performed by: RADIOLOGY

## 2019-07-08 NOTE — PLAN OF CARE
Problem: Adult Inpatient Plan of Care  Goal: Plan of Care Review  Outcome: Ongoing (interventions implemented as appropriate)  Day 1 of XRT to the brain. Nursing education done. Wife and sister at pt side. Handout given and discussed. Pt and family verbalized understanding.

## 2019-07-09 ENCOUNTER — HOSPITAL ENCOUNTER (INPATIENT)
Facility: HOSPITAL | Age: 74
LOS: 1 days | Discharge: HOSPICE | DRG: 175 | End: 2019-07-10
Attending: EMERGENCY MEDICINE | Admitting: HOSPITALIST
Payer: MEDICARE

## 2019-07-09 DIAGNOSIS — I26.99 PULMONARY EMBOLUS: ICD-10-CM

## 2019-07-09 DIAGNOSIS — R07.9 CHEST PAIN: ICD-10-CM

## 2019-07-09 PROBLEM — J18.9 PNEUMONIA: Status: ACTIVE | Noted: 2019-07-09

## 2019-07-09 PROBLEM — Z75.8 DISCHARGE PLANNING ISSUES: Status: ACTIVE | Noted: 2019-07-09

## 2019-07-09 PROBLEM — R73.9 HYPERGLYCEMIA: Status: ACTIVE | Noted: 2019-07-09

## 2019-07-09 PROBLEM — C34.90 METASTATIC LUNG CANCER (METASTASIS FROM LUNG TO OTHER SITE): Status: ACTIVE | Noted: 2019-07-09

## 2019-07-09 LAB
ALBUMIN SERPL BCP-MCNC: 3.1 G/DL (ref 3.5–5.2)
ALP SERPL-CCNC: 72 U/L (ref 55–135)
ALT SERPL W/O P-5'-P-CCNC: 59 U/L (ref 10–44)
ANION GAP SERPL CALC-SCNC: 9 MMOL/L (ref 8–16)
AST SERPL-CCNC: 17 U/L (ref 10–40)
BASOPHILS # BLD AUTO: 0.05 K/UL (ref 0–0.2)
BASOPHILS NFR BLD: 0.2 % (ref 0–1.9)
BILIRUB SERPL-MCNC: 0.5 MG/DL (ref 0.1–1)
BUN SERPL-MCNC: 21 MG/DL (ref 8–23)
BUN SERPL-MCNC: 25 MG/DL (ref 6–30)
CALCIUM SERPL-MCNC: 8.6 MG/DL (ref 8.7–10.5)
CHLORIDE SERPL-SCNC: 95 MMOL/L (ref 95–110)
CHLORIDE SERPL-SCNC: 97 MMOL/L (ref 95–110)
CO2 SERPL-SCNC: 24 MMOL/L (ref 23–29)
CREAT SERPL-MCNC: 1 MG/DL (ref 0.5–1.4)
CREAT SERPL-MCNC: 1 MG/DL (ref 0.5–1.4)
DIFFERENTIAL METHOD: ABNORMAL
EOSINOPHIL # BLD AUTO: 0 K/UL (ref 0–0.5)
EOSINOPHIL NFR BLD: 0 % (ref 0–8)
ERYTHROCYTE [DISTWIDTH] IN BLOOD BY AUTOMATED COUNT: 14.6 % (ref 11.5–14.5)
EST. GFR  (AFRICAN AMERICAN): >60 ML/MIN/1.73 M^2
EST. GFR  (NON AFRICAN AMERICAN): >60 ML/MIN/1.73 M^2
GLUCOSE SERPL-MCNC: 246 MG/DL (ref 70–110)
GLUCOSE SERPL-MCNC: 246 MG/DL (ref 70–110)
HCT VFR BLD AUTO: 42.4 % (ref 40–54)
HCT VFR BLD CALC: 43 %PCV (ref 36–54)
HGB BLD-MCNC: 13.7 G/DL (ref 14–18)
IMM GRANULOCYTES # BLD AUTO: 0.26 K/UL (ref 0–0.04)
IMM GRANULOCYTES NFR BLD AUTO: 1 % (ref 0–0.5)
INR PPP: 1 (ref 0.8–1.2)
LACTATE SERPL-SCNC: 1.9 MMOL/L (ref 0.5–2.2)
LYMPHOCYTES # BLD AUTO: 0.4 K/UL (ref 1–4.8)
LYMPHOCYTES NFR BLD: 1.6 % (ref 18–48)
MCH RBC QN AUTO: 28.6 PG (ref 27–31)
MCHC RBC AUTO-ENTMCNC: 32.3 G/DL (ref 32–36)
MCV RBC AUTO: 89 FL (ref 82–98)
MONOCYTES # BLD AUTO: 2.2 K/UL (ref 0.3–1)
MONOCYTES NFR BLD: 8.6 % (ref 4–15)
NEUTROPHILS # BLD AUTO: 22.8 K/UL (ref 1.8–7.7)
NEUTROPHILS NFR BLD: 88.6 % (ref 38–73)
NRBC BLD-RTO: 0 /100 WBC
PLATELET # BLD AUTO: 412 K/UL (ref 150–350)
PLATELET BLD QL SMEAR: ABNORMAL
PMV BLD AUTO: 9.3 FL (ref 9.2–12.9)
POC IONIZED CALCIUM: 1.11 MMOL/L (ref 1.06–1.42)
POC TCO2 (MEASURED): 26 MMOL/L (ref 23–29)
POCT GLUCOSE: 151 MG/DL (ref 70–110)
POCT GLUCOSE: 262 MG/DL (ref 70–110)
POTASSIUM BLD-SCNC: 4.1 MMOL/L (ref 3.5–5.1)
POTASSIUM SERPL-SCNC: 4.2 MMOL/L (ref 3.5–5.1)
PROT SERPL-MCNC: 6 G/DL (ref 6–8.4)
PROTHROMBIN TIME: 10.4 SEC (ref 9–12.5)
RBC # BLD AUTO: 4.79 M/UL (ref 4.6–6.2)
SAMPLE: ABNORMAL
SODIUM BLD-SCNC: 131 MMOL/L (ref 136–145)
SODIUM SERPL-SCNC: 130 MMOL/L (ref 136–145)
TROPONIN I SERPL DL<=0.01 NG/ML-MCNC: 0.01 NG/ML (ref 0–0.03)
WBC # BLD AUTO: 25.77 K/UL (ref 3.9–12.7)

## 2019-07-09 PROCEDURE — 11000001 HC ACUTE MED/SURG PRIVATE ROOM

## 2019-07-09 PROCEDURE — 85610 PROTHROMBIN TIME: CPT

## 2019-07-09 PROCEDURE — 83605 ASSAY OF LACTIC ACID: CPT

## 2019-07-09 PROCEDURE — 80047 BASIC METABLC PNL IONIZED CA: CPT

## 2019-07-09 PROCEDURE — 94640 AIRWAY INHALATION TREATMENT: CPT

## 2019-07-09 PROCEDURE — 99291 CRITICAL CARE FIRST HOUR: CPT | Mod: 25

## 2019-07-09 PROCEDURE — 96366 THER/PROPH/DIAG IV INF ADDON: CPT

## 2019-07-09 PROCEDURE — 93005 ELECTROCARDIOGRAM TRACING: CPT

## 2019-07-09 PROCEDURE — 63600175 PHARM REV CODE 636 W HCPCS: Performed by: STUDENT IN AN ORGANIZED HEALTH CARE EDUCATION/TRAINING PROGRAM

## 2019-07-09 PROCEDURE — 84484 ASSAY OF TROPONIN QUANT: CPT

## 2019-07-09 PROCEDURE — 99223 PR INITIAL HOSPITAL CARE,LEVL III: ICD-10-PCS | Mod: AI,GC,, | Performed by: HOSPITALIST

## 2019-07-09 PROCEDURE — 85025 COMPLETE CBC W/AUTO DIFF WBC: CPT

## 2019-07-09 PROCEDURE — 99284 EMERGENCY DEPT VISIT MOD MDM: CPT | Mod: GC,,, | Performed by: INTERNAL MEDICINE

## 2019-07-09 PROCEDURE — 99291 PR CRITICAL CARE, E/M 30-74 MINUTES: ICD-10-PCS | Mod: ,,, | Performed by: EMERGENCY MEDICINE

## 2019-07-09 PROCEDURE — 25000003 PHARM REV CODE 250: Performed by: STUDENT IN AN ORGANIZED HEALTH CARE EDUCATION/TRAINING PROGRAM

## 2019-07-09 PROCEDURE — 25000003 PHARM REV CODE 250: Performed by: EMERGENCY MEDICINE

## 2019-07-09 PROCEDURE — 25000242 PHARM REV CODE 250 ALT 637 W/ HCPCS: Performed by: EMERGENCY MEDICINE

## 2019-07-09 PROCEDURE — 82962 GLUCOSE BLOOD TEST: CPT

## 2019-07-09 PROCEDURE — 87040 BLOOD CULTURE FOR BACTERIA: CPT | Mod: 59

## 2019-07-09 PROCEDURE — 96367 TX/PROPH/DG ADDL SEQ IV INF: CPT

## 2019-07-09 PROCEDURE — 27000221 HC OXYGEN, UP TO 24 HOURS

## 2019-07-09 PROCEDURE — 93010 ELECTROCARDIOGRAM REPORT: CPT | Mod: ,,, | Performed by: INTERNAL MEDICINE

## 2019-07-09 PROCEDURE — 25500020 PHARM REV CODE 255: Performed by: EMERGENCY MEDICINE

## 2019-07-09 PROCEDURE — 99291 CRITICAL CARE FIRST HOUR: CPT | Mod: ,,, | Performed by: EMERGENCY MEDICINE

## 2019-07-09 PROCEDURE — 99223 1ST HOSP IP/OBS HIGH 75: CPT | Mod: AI,GC,, | Performed by: HOSPITALIST

## 2019-07-09 PROCEDURE — 96365 THER/PROPH/DIAG IV INF INIT: CPT

## 2019-07-09 PROCEDURE — 93010 EKG 12-LEAD: ICD-10-PCS | Mod: ,,, | Performed by: INTERNAL MEDICINE

## 2019-07-09 PROCEDURE — 80053 COMPREHEN METABOLIC PANEL: CPT

## 2019-07-09 PROCEDURE — 96375 TX/PRO/DX INJ NEW DRUG ADDON: CPT

## 2019-07-09 PROCEDURE — 99284 PR EMERGENCY DEPT VISIT,LEVEL IV: ICD-10-PCS | Mod: GC,,, | Performed by: INTERNAL MEDICINE

## 2019-07-09 PROCEDURE — 63600175 PHARM REV CODE 636 W HCPCS: Performed by: EMERGENCY MEDICINE

## 2019-07-09 RX ORDER — MORPHINE SULFATE 2 MG/ML
3 INJECTION, SOLUTION INTRAMUSCULAR; INTRAVENOUS EVERY 6 HOURS PRN
Status: DISCONTINUED | OUTPATIENT
Start: 2019-07-09 | End: 2019-07-09

## 2019-07-09 RX ORDER — IBUPROFEN 200 MG
24 TABLET ORAL
Status: DISCONTINUED | OUTPATIENT
Start: 2019-07-09 | End: 2019-07-11 | Stop reason: HOSPADM

## 2019-07-09 RX ORDER — MORPHINE SULFATE 2 MG/ML
3 INJECTION, SOLUTION INTRAMUSCULAR; INTRAVENOUS EVERY 4 HOURS PRN
Status: DISCONTINUED | OUTPATIENT
Start: 2019-07-09 | End: 2019-07-10

## 2019-07-09 RX ORDER — INSULIN ASPART 100 [IU]/ML
0-5 INJECTION, SOLUTION INTRAVENOUS; SUBCUTANEOUS
Status: DISCONTINUED | OUTPATIENT
Start: 2019-07-09 | End: 2019-07-11 | Stop reason: HOSPADM

## 2019-07-09 RX ORDER — ENOXAPARIN SODIUM 100 MG/ML
40 INJECTION SUBCUTANEOUS EVERY 24 HOURS
Status: DISCONTINUED | OUTPATIENT
Start: 2019-07-09 | End: 2019-07-09

## 2019-07-09 RX ORDER — ONDANSETRON 4 MG/1
4 TABLET, ORALLY DISINTEGRATING ORAL EVERY 6 HOURS PRN
Status: DISCONTINUED | OUTPATIENT
Start: 2019-07-09 | End: 2019-07-11 | Stop reason: HOSPADM

## 2019-07-09 RX ORDER — IBUPROFEN 200 MG
24 TABLET ORAL
Status: DISCONTINUED | OUTPATIENT
Start: 2019-07-09 | End: 2019-07-09

## 2019-07-09 RX ORDER — IPRATROPIUM BROMIDE AND ALBUTEROL SULFATE 2.5; .5 MG/3ML; MG/3ML
3 SOLUTION RESPIRATORY (INHALATION)
Status: COMPLETED | OUTPATIENT
Start: 2019-07-09 | End: 2019-07-09

## 2019-07-09 RX ORDER — ACETAMINOPHEN 500 MG
1000 TABLET ORAL
Status: COMPLETED | OUTPATIENT
Start: 2019-07-09 | End: 2019-07-09

## 2019-07-09 RX ORDER — MORPHINE SULFATE 2 MG/ML
3 INJECTION, SOLUTION INTRAMUSCULAR; INTRAVENOUS EVERY 4 HOURS PRN
Status: DISCONTINUED | OUTPATIENT
Start: 2019-07-09 | End: 2019-07-09

## 2019-07-09 RX ORDER — GLUCAGON 1 MG
1 KIT INJECTION
Status: DISCONTINUED | OUTPATIENT
Start: 2019-07-09 | End: 2019-07-09

## 2019-07-09 RX ORDER — INSULIN ASPART 100 [IU]/ML
5 INJECTION, SOLUTION INTRAVENOUS; SUBCUTANEOUS
Status: DISCONTINUED | OUTPATIENT
Start: 2019-07-10 | End: 2019-07-11 | Stop reason: HOSPADM

## 2019-07-09 RX ORDER — MORPHINE SULFATE 2 MG/ML
3 INJECTION, SOLUTION INTRAMUSCULAR; INTRAVENOUS
Status: COMPLETED | OUTPATIENT
Start: 2019-07-09 | End: 2019-07-09

## 2019-07-09 RX ORDER — IBUPROFEN 200 MG
16 TABLET ORAL
Status: DISCONTINUED | OUTPATIENT
Start: 2019-07-09 | End: 2019-07-09

## 2019-07-09 RX ORDER — DEXAMETHASONE 1 MG/1
2 TABLET ORAL 2 TIMES DAILY WITH MEALS
Status: DISCONTINUED | OUTPATIENT
Start: 2019-07-09 | End: 2019-07-11 | Stop reason: HOSPADM

## 2019-07-09 RX ORDER — FAMOTIDINE 20 MG/1
20 TABLET, FILM COATED ORAL DAILY
Status: DISCONTINUED | OUTPATIENT
Start: 2019-07-10 | End: 2019-07-11 | Stop reason: HOSPADM

## 2019-07-09 RX ORDER — METOCLOPRAMIDE HYDROCHLORIDE 5 MG/ML
10 INJECTION INTRAMUSCULAR; INTRAVENOUS
Status: COMPLETED | OUTPATIENT
Start: 2019-07-09 | End: 2019-07-09

## 2019-07-09 RX ORDER — CEFTRIAXONE 1 G/1
1 INJECTION, POWDER, FOR SOLUTION INTRAMUSCULAR; INTRAVENOUS
Status: DISCONTINUED | OUTPATIENT
Start: 2019-07-09 | End: 2019-07-10

## 2019-07-09 RX ORDER — ACETAMINOPHEN 325 MG/1
650 TABLET ORAL EVERY 6 HOURS PRN
Status: DISCONTINUED | OUTPATIENT
Start: 2019-07-09 | End: 2019-07-11 | Stop reason: HOSPADM

## 2019-07-09 RX ORDER — GLUCAGON 1 MG
1 KIT INJECTION
Status: DISCONTINUED | OUTPATIENT
Start: 2019-07-09 | End: 2019-07-11 | Stop reason: HOSPADM

## 2019-07-09 RX ORDER — VANCOMYCIN 2 GRAM/500 ML IN 0.9 % SODIUM CHLORIDE INTRAVENOUS
2000
Status: COMPLETED | OUTPATIENT
Start: 2019-07-09 | End: 2019-07-09

## 2019-07-09 RX ORDER — SODIUM CHLORIDE 0.9 % (FLUSH) 0.9 %
10 SYRINGE (ML) INJECTION
Status: DISCONTINUED | OUTPATIENT
Start: 2019-07-09 | End: 2019-07-11 | Stop reason: HOSPADM

## 2019-07-09 RX ORDER — IBUPROFEN 200 MG
16 TABLET ORAL
Status: DISCONTINUED | OUTPATIENT
Start: 2019-07-09 | End: 2019-07-11 | Stop reason: HOSPADM

## 2019-07-09 RX ORDER — HYDROMORPHONE HYDROCHLORIDE 1 MG/ML
1 INJECTION, SOLUTION INTRAMUSCULAR; INTRAVENOUS; SUBCUTANEOUS ONCE
Status: COMPLETED | OUTPATIENT
Start: 2019-07-09 | End: 2019-07-09

## 2019-07-09 RX ORDER — SODIUM CHLORIDE 0.9 % (FLUSH) 0.9 %
10 SYRINGE (ML) INJECTION
Status: CANCELLED | OUTPATIENT
Start: 2019-07-09

## 2019-07-09 RX ADMIN — METOCLOPRAMIDE 10 MG: 5 INJECTION, SOLUTION INTRAMUSCULAR; INTRAVENOUS at 08:07

## 2019-07-09 RX ADMIN — MORPHINE SULFATE 3 MG: 2 INJECTION, SOLUTION INTRAMUSCULAR; INTRAVENOUS at 07:07

## 2019-07-09 RX ADMIN — IOHEXOL 75 ML: 350 INJECTION, SOLUTION INTRAVENOUS at 12:07

## 2019-07-09 RX ADMIN — ACETAMINOPHEN 1000 MG: 500 TABLET ORAL at 08:07

## 2019-07-09 RX ADMIN — IPRATROPIUM BROMIDE AND ALBUTEROL SULFATE 3 ML: .5; 3 SOLUTION RESPIRATORY (INHALATION) at 01:07

## 2019-07-09 RX ADMIN — SODIUM CHLORIDE 1000 ML: 0.9 INJECTION, SOLUTION INTRAVENOUS at 03:07

## 2019-07-09 RX ADMIN — SODIUM CHLORIDE 1000 ML: 0.9 INJECTION, SOLUTION INTRAVENOUS at 10:07

## 2019-07-09 RX ADMIN — VANCOMYCIN HYDROCHLORIDE 2000 MG: 100 INJECTION, POWDER, LYOPHILIZED, FOR SOLUTION INTRAVENOUS at 11:07

## 2019-07-09 RX ADMIN — PIPERACILLIN AND TAZOBACTAM 4.5 G: 4; .5 INJECTION, POWDER, LYOPHILIZED, FOR SOLUTION INTRAVENOUS; PARENTERAL at 10:07

## 2019-07-09 RX ADMIN — ACETAMINOPHEN 1000 MG: 500 TABLET ORAL at 01:07

## 2019-07-09 RX ADMIN — CEFTRIAXONE SODIUM 1 G: 1 INJECTION, POWDER, FOR SOLUTION INTRAMUSCULAR; INTRAVENOUS at 06:07

## 2019-07-09 RX ADMIN — HYDROMORPHONE HYDROCHLORIDE 1 MG: 1 INJECTION, SOLUTION INTRAMUSCULAR; INTRAVENOUS; SUBCUTANEOUS at 08:07

## 2019-07-09 RX ADMIN — ONDANSETRON 4 MG: 4 TABLET, ORALLY DISINTEGRATING ORAL at 07:07

## 2019-07-09 RX ADMIN — MORPHINE SULFATE 3 MG: 2 INJECTION, SOLUTION INTRAMUSCULAR; INTRAVENOUS at 03:07

## 2019-07-09 NOTE — ASSESSMENT & PLAN NOTE
on admission  - not on any home DM meds  - will start 5u aspart with meals and 10u detemir qD + SSI  - continue to monitor

## 2019-07-09 NOTE — ASSESSMENT & PLAN NOTE
CTA suspicious for post obstructive pneumonia  - will start ceftriaxone 1g q12h  - BC collected

## 2019-07-09 NOTE — ED PROVIDER NOTES
SCRIBE #1 NOTE: I, Rain Carney , am scribing for, and in the presence of,  Dr. Spencer . I have scribed the entire note.       CC: Chest Pain (hiccups since 3pm yesterday and Chest pain started at 9pm last night. NSR on EKG per EMS.  recently diagnosed with brain Cancer. on chemo)      History provided by: Patient and a relative.    HPI: Jeffery Reyna is a 73 y.o. year old male with PMHx HTN, DVT, and lung cancer with metastasis to the brain and liver presents to the ED complaining of hiccups and chest/RUQ abdominal pain.   Pt states that he has been having hiccups since yesterday. His relative reports that he was recently started on steriods during a hospital admission 2 weeks ago and the hiccups originally stared after he began taking the steroids. The relative also explains that usually the hiccups only last a few minutes but they have been constant since yesterday.  He describes his pain today to be like a muscle cramp that radiates to his back and side that reminds him of indigestion. He feels okay as long as he doesn't burp or hiccup. He has been passing gas okay with normal bowel movements. He has some baseline constipation and gas. He endorses intermittent headaches. He denies SOB, new numbness/tingling, trouble swallowing, cough, and visual changes.         Past Medical History:   Diagnosis Date    Cancer     DVT (deep venous thrombosis)     Hypertension      History reviewed. No pertinent surgical history.  Family History   Problem Relation Age of Onset    Diabetes Mother     Thyroid disease Mother     Cancer Mother     Hypertension Sister      No current facility-administered medications on file prior to encounter.      Current Outpatient Medications on File Prior to Encounter   Medication Sig Dispense Refill    amLODIPine (NORVASC) 10 MG tablet Take 1 tablet (10 mg total) by mouth once daily. 90 tablet 3    dexAMETHasone (DECADRON) 2 MG tablet Take 1 tablet (2 mg total) by mouth 2 (two) times daily  "with meals. Take 4mg q 6h x 3 days, then 4mg q 8h x 3 days, then 4mg q 12h x 3 days, then 2mg q 8h x 3 days, then 2mg q 12h x 3 days, then 2mg daily x 6 days, then stop. for 10 days 66 tablet 0    famotidine (PEPCID) 20 MG tablet Take 1 tablet (20 mg total) by mouth once daily. 30 tablet 11    ondansetron (ZOFRAN-ODT) 4 MG TbDL Take 1 tablet (4 mg total) by mouth every 6 (six) hours as needed (nausea). 30 tablet 3    oxyCODONE (ROXICODONE) 5 MG immediate release tablet Take 1 tablet (5 mg total) by mouth every 6 (six) hours as needed. 40 tablet 0    acetaminophen (TYLENOL) 325 MG tablet Take 2 tablets (650 mg total) by mouth every 6 (six) hours as needed (temp greater than 99.5 F).  0     Patient has no known allergies.  Social History     Socioeconomic History    Marital status: Single     Spouse name: Not on file    Number of children: Not on file    Years of education: Not on file    Highest education level: Not on file   Occupational History    Not on file   Social Needs    Financial resource strain: Not on file    Food insecurity:     Worry: Not on file     Inability: Not on file    Transportation needs:     Medical: Not on file     Non-medical: Not on file   Tobacco Use    Smoking status: Former Smoker     Packs/day: 1.00     Years: 55.00     Pack years: 55.00     Types: Cigarettes     Last attempt to quit: 2019     Years since quittin.0    Smokeless tobacco: Never Used   Substance and Sexual Activity    Alcohol use: Yes     Comment: daily "6 beers"    Drug use: No    Sexual activity: Not on file   Lifestyle    Physical activity:     Days per week: Not on file     Minutes per session: Not on file    Stress: Not on file   Relationships    Social connections:     Talks on phone: Not on file     Gets together: Not on file     Attends Taoist service: Not on file     Active member of club or organization: Not on file     Attends meetings of clubs or organizations: Not on file     " Relationship status: Not on file   Other Topics Concern    Not on file   Social History Narrative    Not on file       ROS:     Constitutional : neg  HEENT neg  Resp neg  Cardiac Positive for chest pain.   GI Positive for RUQ abdominal Pain   neg  Neuro Positive for headaches.   Heme/Immune: neg  Endo neg  Skin neg    PHYSICAL EXAM:  Vitals:    07/09/19 1153   BP:    Pulse: 72   Resp:    Temp:          PHYSICAL EXAM:   general: comfortable, in no acute distress  VS: triage VS reviewed  HEENT: NC/AT, PERRL, EOMI  Neck: trachea midline  CV: RRR, no m/r/g, no LE pitting edema, ttp over the ant-lat R6-8  Resp: CTAB  ABD:  ND, + normal BS, NT  Renal: No CVAT  Neuro: AAO x 3, 5/5 muscle strength in upper and lower extremities, sensation grossly intact, face symmetric, speech normal  Ext: no deformity, +2 symmetrical peripheral pulses          DATA & INTERVENTIONS:    LABS reviewed:  Labs Reviewed   COMPREHENSIVE METABOLIC PANEL - Abnormal; Notable for the following components:       Result Value    Sodium 130 (*)     Glucose 246 (*)     Calcium 8.6 (*)     Albumin 3.1 (*)     ALT 59 (*)     All other components within normal limits   CBC W/ AUTO DIFFERENTIAL - Abnormal; Notable for the following components:    WBC 25.77 (*)     Hemoglobin 13.7 (*)     RDW 14.6 (*)     Platelets 412 (*)     Immature Granulocytes 1.0 (*)     Gran # (ANC) 22.8 (*)     Immature Grans (Abs) 0.26 (*)     Lymph # 0.4 (*)     Mono # 2.2 (*)     Gran% 88.6 (*)     Lymph% 1.6 (*)     Platelet Estimate Increased (*)     All other components within normal limits   ISTAT PROCEDURE - Abnormal; Notable for the following components:    POC Glucose 246 (*)     POC Sodium 131 (*)     All other components within normal limits   CULTURE, BLOOD   CULTURE, BLOOD   PROTIME-INR   LACTIC ACID, PLASMA   TROPONIN I   ISTAT CHEM8       RADIOLOGY reviewed:  Imaging Results           CTA Chest Non-Coronary - PE Study (Final result)  Result time 07/09/19 12:38:48     Final result by Slick Rodriguez MD (07/09/19 12:38:48)                 Impression:      This report was flagged in Epic as abnormal.    1. Pulmonary thromboembolism involving the left upper and left lower lobes, with questionable distal embolus involving the right lower lobe.  Correlation is advised.  2. In this patient with known right upper lobe mass, there has been interval increase in patchy ground-glass/consolidative opacity about the mass, to involve the right upper, right middle, and right lower lobes.  Differential would include post obstructive effect, with infection or spread of malignancy not excluded.  Correlation is advised.  3. Peribronchial thickening, primarily involving the bronchial airways to the lower lobes, could reflect superimposed bronchial airway infection or inflammation.  There is some internal debris within bronchial airways to the left lower lobe, this places patient at risk for aspiration.  Correlation is needed.  4. Patulous distal esophagus with debris within the esophagus, may reflect sequela of dysphagia.  5. Several additional findings above.  Also, please see CT 06/29/2019.      Electronically signed by: Slick Rodriguez MD  Date:    07/09/2019  Time:    12:38             Narrative:    EXAMINATION:  CTA CHEST NON CORONARY    CLINICAL HISTORY:  Chest pain, acute, PE suspected, high pretest prob;    TECHNIQUE:  Low dose axial images, sagittal and coronal reformations were obtained from the thoracic inlet to the lung bases following the IV administration of 75 mL of Omnipaque 350.  Contrast timing was optimized to evaluate the pulmonary arteries.  MIP images were performed.    COMPARISON:  06/29/2019    FINDINGS:  The structures at the base of the neck are grossly unremarkable.  There are prominent lymph nodes in the right hilar region, largest measures up to 1.5 cm, similar to the previous examination.  There is calcification in the distribution of the coronary arteries.  The  thoracic aorta tapers normally.  There is debris within the distal esophagus noting distal esophagus is patulous.  The heart is not enlarged.  No pericardial effusion.  The visualized portions of the right kidney, spleen, liver and pancreas are grossly unremarkable.  Please note, multiple enhancing lesions identified within the hepatic parenchyma on CT 06/29/2019 are not visualized on current exam secondary to bolus timing, please see that exam.  The adrenal glands are grossly unremarkable.  There is a low attenuating lesion arising from the upper pole of the left kidney, better characterized on previous exam, partially imaged on current exam.    The left airways are grossly patent.  There is some peribronchial thickening involving the airways to the bilateral lower lobes.  There is intraluminal debris within the right lower lobe bronchus extending distally, placing patient at risk for aspiration.  There is bilateral emphysematous change.  There is a spiculated mass within the left upper lobe, measurement is difficult given its somewhat amorphous appearance, and measures approximately 5.0 x 4.0 cm, grossly similar to the previous examination allowing for differences in slice selection.  There is an increase in the adjacent patchy ground-glass and consolidative opacity, now involving the majority of the right upper and right middle lobes, suggesting worsening either postobstructive phenomenon, infection, with worsening metastatic involvement not exclude.  There is patchy consolidation involving the right lower lobe, in a dependent fashion, also new since the previous examination.  No large focal consolidation involving the left hemithorax.  There is a 0.2 cm pulmonary nodule within the periphery of the left upper lobe, unchanged.  There is a ground-glass nodule within the left upper lobe measuring 0.3 cm versus atelectasis.  No pleural effusion.  No pneumothorax.    Bolus timing is adequate for the evaluation of  pulmonary thromboembolism.  Allowing for motion artifact, there are filling defects within a lobar artery to the left upper lobe, extending to involve distal segmental and subsegmental branches.  Additional questionable filling defects versus artifact are noted within a segmental branch to the left lower lobe.  There is strand-like filling defect within a distal segmental artery to the right lower lobe versus motion artifact.                               CT Head Without Contrast (Final result)  Result time 07/09/19 09:56:20    Final result by Roly Moreno III, MD (07/09/19 09:56:20)                 Impression:      Resolving intraparenchymal hemorrhages as above with decreasing size mass mass effect and surrounding vasogenic edema.      Electronically signed by: Roly Moreno MD  Date:    07/09/2019  Time:    09:56             Narrative:    EXAMINATION:  CT HEAD WITHOUT CONTRAST    CLINICAL HISTORY:  Syncope/fainting;known brain mets, worsenibng hiccups;    FINDINGS:  Comparison is CT head dated 06/29/2019 and MRI brain 06/29/2019.    Resolving intraparenchymal hemorrhages are seen in the following locations: Right frontal, right occipital, left posterior frontal, left thalamic, and right paramidline pontomedullary junction.  These are less intense smaller with less surrounding edema.  No definite herniation or midline shift is seen.  These could conceivably be hemorrhagic metastases.  No skull lesion or skull fracture seen.                               X-Ray Chest AP Portable (Final result)  Result time 07/09/19 09:01:25    Final result by Hosea Riggs MD (07/09/19 09:01:25)                 Impression:      Abnormal chest radiograph as discussed above.  The pulmonary parenchymal opacity in the right mid lung zone is substantially more pronounced on the current exam than on 06/29/2019.  In correlation with the prior thoracic CT examination, this interval change in such a short time frame likely represents  worsening postobstructive pneumonia in the right lower lobe in association with the patient's known lung carcinoma.      Electronically signed by: Hosea Riggs MD  Date:    07/09/2019  Time:    09:01             Narrative:    EXAMINATION:  XR CHEST AP PORTABLE    CLINICAL HISTORY:  worsenin hiccups, right side chest pain;    COMPARISON:  Comparison is made to the most recent prior chest radiograph of 06/29/2019, with reference also made to a thoracic CT examination of that same date.  Information obtained from the electronic medical record indicates a known lung carcinoma    FINDINGS:  A large abnormal pulmonary parenchymal opacity in the right mid lung zone/perihilar region is observed, significantly larger than was the case on 06/29/2019.  The thoracic CT examination of that date demonstrated a spiculated mass lesion in the right lower lobe and some associated airspace consolidation felt to represent obstructive pneumonitis.  The rapid interval increase in the size of the opacity since the recent 06/29/2019 examination would suggest worsening obstructive pneumonia in association with what is evidently a known lung carcinoma.    Heart size and the appearance of the cardiomediastinal silhouette have not changed appreciably since 06/29/2019, allowing for magnification related to projection and a poorer inspiratory depth than previously.  The left lung remains clear, and free of superimposed airspace consolidation or volume loss.  No pleural fluid of any substantial volume is seen on either side.  No pneumothorax.                                MEDICATIONS/FLUIDS:  Medications   vancomycin 2 g in 0.9% sodium chloride 500 mL IVPB (2,000 mg Intravenous New Bag 7/9/19 1129)   albuterol-ipratropium 2.5 mg-0.5 mg/3 mL nebulizer solution 3 mL (has no administration in time range)   acetaminophen tablet 1,000 mg (1,000 mg Oral Given 7/9/19 0839)   metoclopramide HCl injection 10 mg (10 mg Intravenous Given 7/9/19 0839)    piperacillin-tazobactam 4.5 g in sodium chloride 0.9% 100 mL IVPB (ready to mix system) (0 g Intravenous Stopped 7/9/19 1127)   sodium chloride 0.9% bolus 1,000 mL (0 mLs Intravenous Stopped 7/9/19 1127)   iohexol (OMNIPAQUE 350) injection 75 mL (75 mLs Intravenous Given 7/9/19 1224)         MDM:  Jeffery Reyna is a 73 y.o. year old male who presents to the ED complaining of right anterior lateral chest wall pain worse with touch feels like indigestion.  Denies fevers cough shortness of breath denies nausea or vomiting, for+ flatus    DDX includes but not limited to:  Pleural effusion versus pneumonia versus PE versus pain secondary to lung mass versus rib lesion versus liver metastasis    Labs ordered and interpreted:   WBC elevated at 25, patient on steroids, suspicion also for infection  Troponin negative  INR within normal limits  Normal GFR   Lactate within normal limits    CXR (ordered and independently reviewed):  Right middle lobe lung mass much larger than chest x-ray and the end of June    EKG (independantly reviewed):  HR 63 bpm. Normal Sinus Rhythm. No acute ischemic changes. No RV strain.        10:19 AM  patient noted to be hypoxic on RA at 85%, placed on 4 L nasal cannula  Wbc 25  Chest x-ray concerning for postobstructive pneumonia  Will start patient on vancomycin and Zosyn, IV fluids  Will obtain CT chest for better description of the right lung mass rule out pneumonia versus rule out PE    12:45 PM  Pt re-evaluated.  Cough with hemoptysis in the room, with cough his oxygen saturation drops in the mid 80s    CT head with resolve the intraparenchymal hemorrhages  CT PE with PE of the left upper and left lower lobes also interval increase of the opacity about the mass possibly postobstructive affect with infection or spread of malignancy    Patient is not a candidate for anticoagulation secondary to history of hemorrhagic intraparenchymal brain metastases.  Cardiology was consulted for  recommendations for options for treatment of pulmonary emboli.   Discussed with the patient and the family, patient is DNR DNI  Chart reviewed: radiation therapy had 2 sessions 1 on Friday and 1 on Monday    Case discussed with the consultant:  Cardialogy    IMPRESSION:  1.)  Hypoxic respiratory failure  2.)  Pes  3.  Lung cancer with postobstructive pneumonia    Dispo:  Admission    Aggregate Critical Care Time by Attending (exclusive of procedural time) = 50 minutes         During the Emergency Depment visit, there was a high probability of imminent or life threatening deterioration in the patient's condition necessitating medical decision  making of a high complexity. Organ systems that were compromised/potentially compromised                      central nervous system                      cardiovascular                     pulmonary                           Pt required constant monitoring because of the potential for them to deteriorate at any moment. Critical care was time spent personally by me on the following activities:  Development of treatment plan with patient or surrogate; discussion with consultant, evaluation of patient's response to treatment, examination of patient, obtaining history from patient or surrogate, ordering and performing treatments and interventions, ordering and reviewing of laboratory studies, ordering and review of radiographic studies, vitals, re-evaluation of patient' condition and review of old charts            The failure to initiate the interventions performed in the Emergency Department on an urgent basis would have likely resulted in sudden, clinically significant or life threatening deterioration in the patient's condition.                            Meena Spencer MD  07/09/19 1922

## 2019-07-09 NOTE — HPI
Mr. Reyna is a 74 yo man with hx of DVT 2014, HTN, smoking, recently diagnosed likely metastatic lung cancer with hemorrhagic brain mets on 6/29/19 presenting with pleuritic CP, worsening shortness of breath, hypoxia. He was admitted on 6/29 with headache/N/V, found to have several hemorrhagic brain lesions concerning for metastasis. He was started on dexamethasone for associated edema. A CT chest showed a large (5x4x4) spicularted RUL mass c/f primary lung malignancy with surrounding patchy airspace disease c/f postobstructive PNA/inflammatory process/metastatic disease. Rad/Onc was consulted and he started whole brain palliative RT which he started after discharge on 7/3. He returns with one day of pleuritic chest pain, associated with scant hemoptysis, and mildly worsened SOB. Pain is predominantly in R anterior and lateral chest.  CTA here shows bilateral PE involving left upper lobe artery, left lower lobe artery, and possible distal R lower lobe involvement. Additionally with worsened GGO/consolidation adjacent to mass c/f possible postobstructive PNA vs spread of malignancy.  Of note, DVT U/S last admission showed superficial acute or subacute RLE lesser saphenous vein thrombosis as well as chronic L DVT significantly decreased from last U/S 11/2014. Probable chronic, nonocclusive thrombosis involing prox/mid/distal femoral vein with venous collateral formation along the course of the femoral vein.    Here BP 94//64. HR 70s, sinus. SpO2 95% on 6L. EKG NSR, no evidence of RV strain. Tn 0.006.

## 2019-07-09 NOTE — ASSESSMENT & PLAN NOTE
Chest CT 6/29: Large spiculated mass within the inferior aspect of the right upper lobe worrisome for primary lung malignancy. Multiple enhancing liver lesions  CTA 7/9: interval increase in patchy ground-glass/consolidative opacity about the mass, to involve the right upper, right middle, and right lower lobes.  Differential would include post obstructive effect, with infection or spread of malignancy not excluded.  CT head: Resolving intraparenchymal hemorrhages as above with decreasing size mass mass effect and surrounding vasogenic edema.  - During last admission on 6/29: NSG but no surgical intervention. started on dexamethasone (cont till 7/12) taper but not no keppra. He is scheduled to get IR lung biopsy as an outpatient. Oncology was consulted, as was rad onc, and he started radiation therapy on 7/5.  - Goals of care discussed with family. Will have hospice rep and team speak with patient tomorrow about possible hospice care  - pain control with morphine 3mg q4hr PRN

## 2019-07-09 NOTE — ED NOTES
Family aware that patient has a room that is being cleaned at this moment, awaiting for room to be cleaned

## 2019-07-09 NOTE — ED NOTES
Patient is awake, alert and oriented x 3. Patients respirations even and unlabored. Patient has no complaints at this time. Patient updated on plan of care at this time. Cardiac/pulse ox/bp monitor on patient with alarms set. Side rails x 2. Will continue to monitor at this time.     Patient/family member aware that they are being admitted to hospital. Awaiting bed assignment at this time. Informed patient/family that nurse will keep updating patient status. Will continue to monitor at this time

## 2019-07-09 NOTE — ED NOTES
Patient moved to ED room 16 via stretcher, patient assisted onto stretcher and changed into a gown. Patient placed on cardiac monitor, continuous pulse oximetry and automatic blood pressure cuff. Bed placed in low locked position, side rails up x 2, call light is within reach of patient or family, orientation to room and explanation of wait provided to family and patient, alarms set and turned on for monitor and pulse ox, awaiting MD evaluation and orders, will continue to monitor.

## 2019-07-09 NOTE — PLAN OF CARE
CM, Dr. Patel and Dr. Quintana from  5 spoke with patient's POA/Girlfriend Davis and sister, Rowena regarding disease process and current prognosis. Patient expressed to Physicians a desire to have better pain management and shift goals of care to comfort. Girlfriend and sister concerned about diminished life expectancy if radiation treatments discontinued. Discussed potential that treatments were palliative in nature but could be continued through Hospice company. Family would like to meet with Liaison form [facility]"" on 7/10/19 @ 1PM to further discuss Hospice care. Call placed to Hany from Hospice to arrange meeting. Referral placed via [facility]. Will continue to monitor.     07/09/19 1706   Discharge Assessment   Assessment Type Discharge Planning Assessment   Confirmed/corrected address and phone number on facesheet? Yes   Assessment information obtained from? Patient   Communicated expected length of stay with patient/caregiver no   Prior to hospitilization cognitive status: Alert/Oriented   Prior to hospitalization functional status: Assistive Equipment;Independent   Current cognitive status: Alert/Oriented   Current Functional Status: Independent   Facility Arrived From: Home   Lives With significant other   Able to Return to Prior Arrangements yes   Is patient able to care for self after discharge? Yes   Who are your caregiver(s) and their phone number(s)?   (Davis Castro (Girlfriend/POA) [phone])   Patient's perception of discharge disposition hospice/home   Readmission Within the Last 30 Days no previous admission in last 30 days   Patient currently being followed by outpatient case management? No   Patient currently receives any other outside agency services? No   Equipment Currently Used at Home none   Do you have any problems affording any of your prescribed medications? No   Is the patient taking medications as prescribed? yes   Does the patient have transportation home? Yes    Transportation Anticipated family or friend will provide   Does the patient receive services at the [facility]? No   Discharge Plan A Home with family;Hospice/home   Discharge Plan B Home;Home Health   DME Needed Upon Discharge  none   Patient/Family in Agreement with Plan yes

## 2019-07-09 NOTE — HPI
73 year old with history of HTN, metastatic lung cancer (brain and liver) who presented due to chest pain. Patient reports he has had hiccups for the past week and feels as if this is causing his pain. The pain is right and mid axillary. Worse with inspiration. However, this morning patient had an episode of hemoptysis so he came to the ED. CTA in the ED: Pulmonary thromboembolism involving the left upper and left lower lobes, with questionable distal embolus involving the right lower lobe. interval increase of RUL consolidation (compared to 6/29 CT) to include the RUL, RML, RLL; could represent post obstructive effect vs infection vs spread of malignancy. CT head showed resolving intraparenchymal hemorrhages.He denies fever, n/v, cough, SOB, diarrhea or constipation.     He was recently admitted on 6/29 with headaches and N/V as his presenting symptoms. He was found to have a right lung lobe lesion suspicious for lung CA with brain and liver mets. He was seen by NSG but no surgical intervention. He was started on dexamethasone (cont till 7/12) taper but not no keppra. He is scheduled to get IR lung biopsy as an outpatient. Oncology was consulted, as was rad onc, and he started radiation therapy on 7/5.

## 2019-07-09 NOTE — SUBJECTIVE & OBJECTIVE
"Past Medical History:   Diagnosis Date    Cancer     DVT (deep venous thrombosis)     Hypertension        History reviewed. No pertinent surgical history.    Review of patient's allergies indicates:  No Known Allergies    No current facility-administered medications on file prior to encounter.      Current Outpatient Medications on File Prior to Encounter   Medication Sig    amLODIPine (NORVASC) 10 MG tablet Take 1 tablet (10 mg total) by mouth once daily.    dexAMETHasone (DECADRON) 2 MG tablet Take 1 tablet (2 mg total) by mouth 2 (two) times daily with meals. Take 4mg q 6h x 3 days, then 4mg q 8h x 3 days, then 4mg q 12h x 3 days, then 2mg q 8h x 3 days, then 2mg q 12h x 3 days, then 2mg daily x 6 days, then stop. for 10 days    famotidine (PEPCID) 20 MG tablet Take 1 tablet (20 mg total) by mouth once daily.    ondansetron (ZOFRAN-ODT) 4 MG TbDL Take 1 tablet (4 mg total) by mouth every 6 (six) hours as needed (nausea).    oxyCODONE (ROXICODONE) 5 MG immediate release tablet Take 1 tablet (5 mg total) by mouth every 6 (six) hours as needed.    acetaminophen (TYLENOL) 325 MG tablet Take 2 tablets (650 mg total) by mouth every 6 (six) hours as needed (temp greater than 99.5 F).     Family History     Problem Relation (Age of Onset)    Cancer Mother    Diabetes Mother    Hypertension Sister    Thyroid disease Mother        Tobacco Use    Smoking status: Former Smoker     Packs/day: 1.00     Years: 55.00     Pack years: 55.00     Types: Cigarettes     Last attempt to quit: 2019     Years since quittin.0    Smokeless tobacco: Never Used   Substance and Sexual Activity    Alcohol use: Yes     Comment: daily "6 beers"    Drug use: No    Sexual activity: Not on file     Review of Systems   Constitution: Positive for malaise/fatigue.   All other systems reviewed and are negative.    Objective:     Vital Signs (Most Recent):  Temp: 96.6 °F (35.9 °C) (19 0802)  Pulse: 76 (19 1320)  Resp: " (!) 26 (07/09/19 1311)  BP: 94/62 (07/09/19 1301)  SpO2: 95 % (07/09/19 1320) Vital Signs (24h Range):  Temp:  [96.6 °F (35.9 °C)] 96.6 °F (35.9 °C)  Pulse:  [62-80] 76  Resp:  [16-26] 26  SpO2:  [85 %-98 %] 95 %  BP: ()/(59-66) 94/62     Weight: 83.9 kg (185 lb)  Body mass index is 25.8 kg/m².    SpO2: 95 %  O2 Device (Oxygen Therapy): nasal cannula    No intake or output data in the 24 hours ending 07/09/19 1409    Lines/Drains/Airways     Peripheral Intravenous Line                 Peripheral IV - Single Lumen 07/09/19 20 G Right Antecubital less than 1 day                Physical Exam   Constitutional: No distress.   Ill-appearing man   HENT:   Head: Atraumatic.   Mouth/Throat: No oropharyngeal exudate.   Eyes: EOM are normal. No scleral icterus.   Neck: Neck supple. No JVD present.   Cardiovascular: Normal rate, regular rhythm and normal heart sounds. Exam reveals no gallop and no friction rub.   No murmur heard.  No RV heave.   Pulmonary/Chest: Effort normal. No respiratory distress. He has no wheezes. He has no rales. He exhibits no tenderness.   NC in place. Decreased breath sounds R middle to base   Abdominal: Soft. Bowel sounds are normal. He exhibits no distension. There is no tenderness.   Musculoskeletal: He exhibits no edema.   Skin: Skin is warm and dry. No erythema.   Psychiatric: He has a normal mood and affect.       Significant Labs:   ABG: No results for input(s): PH, PCO2, HCO3, POCSATURATED, BE in the last 48 hours., BMP:   Recent Labs   Lab 07/09/19  0829   *   *   K 4.2   CL 97   CO2 24   BUN 21   CREATININE 1.0   CALCIUM 8.6*   , CMP   Recent Labs   Lab 07/09/19  0829   *   K 4.2   CL 97   CO2 24   *   BUN 21   CREATININE 1.0   CALCIUM 8.6*   PROT 6.0   ALBUMIN 3.1*   BILITOT 0.5   ALKPHOS 72   AST 17   ALT 59*   ANIONGAP 9   ESTGFRAFRICA >60.0   EGFRNONAA >60.0   , CBC   Recent Labs   Lab 07/09/19  0829 07/09/19  0834   WBC 25.77*  --    HGB 13.7*  --    HCT  42.4 43   *  --    , INR   Recent Labs   Lab 07/09/19  0829   INR 1.0    and Troponin   Recent Labs   Lab 07/09/19  0829   TROPONINI 0.008       Significant Imaging: Echocardiogram: 2D echo with color flow doppler: No results found for this or any previous visit.

## 2019-07-09 NOTE — ED NOTES
The patient is resting quietly, eyes closed, arouses easily to stimuli. Airway is open and patent, respirations are spontaneous, normal respiratory effort and rate noted, skin warm and dry, appearance: in no acute distress and resting comfortably. Family updated that we are awaiting CT scan results, verbalized understanding.

## 2019-07-09 NOTE — CONSULTS
[facility] [address]  Cardiology  Consult Note    Patient Name: Jeffery Reyna  MRN: [id number]  Admission Date: 7/9/2019  Hospital Length of Stay: 0 days  Code Status: Prior   Attending Provider: Meena Spencer MD   Consulting Provider: Chris Watson MD  Primary Care Physician: Flori Harmon MD  Principal Problem:<principal problem not specified>    Patient information was obtained from patient, past medical records and ER records.     Consults  Subjective:     Chief Complaint:  Chest pain     HPI:   Mr. Reyna is a 74 yo man with hx of DVT 2014, HTN, smoking, recently diagnosed likely metastatic lung cancer with hemorrhagic brain mets on 6/29/19 presenting with pleuritic CP, worsening shortness of breath, hypoxia. He was admitted on 6/29 with headache/N/V, found to have several hemorrhagic brain lesions concerning for metastasis. He was started on dexamethasone for associated edema. A CT chest showed a large (5x4x4) spicularted RUL mass c/f primary lung malignancy with surrounding patchy airspace disease c/f postobstructive PNA/inflammatory process/metastatic disease. Rad/Onc was consulted and he started whole brain palliative RT which he started after discharge on 7/3. He returns with one day of pleuritic chest pain, associated with scant hemoptysis, and mildly worsened SOB. Pain is predominantly in R anterior and lateral chest.  CTA here shows bilateral PE involving left upper lobe artery, left lower lobe artery, and possible distal R lower lobe involvement. Additionally with worsened GGO/consolidation adjacent to mass c/f possible postobstructive PNA vs spread of malignancy.  Of note, DVT U/S last admission showed superficial acute or subacute RLE lesser saphenous vein thrombosis as well as chronic L DVT significantly decreased from last U/S 11/2014. Probable chronic, nonocclusive thrombosis involing prox/mid/distal femoral vein with venous collateral formation along the course of the femoral  "vein.    Here BP 94//64. HR 70s, sinus. SpO2 95% on 6L. EKG NSR, no evidence of RV strain. Tn 0.006.    Past Medical History:   Diagnosis Date    Cancer     DVT (deep venous thrombosis)     Hypertension        History reviewed. No pertinent surgical history.    Review of patient's allergies indicates:  No Known Allergies    No current facility-administered medications on file prior to encounter.      Current Outpatient Medications on File Prior to Encounter   Medication Sig    amLODIPine (NORVASC) 10 MG tablet Take 1 tablet (10 mg total) by mouth once daily.    dexAMETHasone (DECADRON) 2 MG tablet Take 1 tablet (2 mg total) by mouth 2 (two) times daily with meals. Take 4mg q 6h x 3 days, then 4mg q 8h x 3 days, then 4mg q 12h x 3 days, then 2mg q 8h x 3 days, then 2mg q 12h x 3 days, then 2mg daily x 6 days, then stop. for 10 days    famotidine (PEPCID) 20 MG tablet Take 1 tablet (20 mg total) by mouth once daily.    ondansetron (ZOFRAN-ODT) 4 MG TbDL Take 1 tablet (4 mg total) by mouth every 6 (six) hours as needed (nausea).    oxyCODONE (ROXICODONE) 5 MG immediate release tablet Take 1 tablet (5 mg total) by mouth every 6 (six) hours as needed.    acetaminophen (TYLENOL) 325 MG tablet Take 2 tablets (650 mg total) by mouth every 6 (six) hours as needed (temp greater than 99.5 F).     Family History     Problem Relation (Age of Onset)    Cancer Mother    Diabetes Mother    Hypertension Sister    Thyroid disease Mother        Tobacco Use    Smoking status: Former Smoker     Packs/day: 1.00     Years: 55.00     Pack years: 55.00     Types: Cigarettes     Last attempt to quit: 2019     Years since quittin.0    Smokeless tobacco: Never Used   Substance and Sexual Activity    Alcohol use: Yes     Comment: daily "6 beers"    Drug use: No    Sexual activity: Not on file     Review of Systems   Constitution: Positive for malaise/fatigue.   All other systems reviewed and are " negative.    Objective:     Vital Signs (Most Recent):  Temp: 96.6 °F (35.9 °C) (07/09/19 0802)  Pulse: 76 (07/09/19 1320)  Resp: (!) 26 (07/09/19 1311)  BP: 94/62 (07/09/19 1301)  SpO2: 95 % (07/09/19 1320) Vital Signs (24h Range):  Temp:  [96.6 °F (35.9 °C)] 96.6 °F (35.9 °C)  Pulse:  [62-80] 76  Resp:  [16-26] 26  SpO2:  [85 %-98 %] 95 %  BP: ()/(59-66) 94/62     Weight: 83.9 kg (185 lb)  Body mass index is 25.8 kg/m².    SpO2: 95 %  O2 Device (Oxygen Therapy): nasal cannula    No intake or output data in the 24 hours ending 07/09/19 1409    Lines/Drains/Airways     Peripheral Intravenous Line                 Peripheral IV - Single Lumen 07/09/19 20 G Right Antecubital less than 1 day                Physical Exam   Constitutional: No distress.   Ill-appearing man   HENT:   Head: Atraumatic.   Mouth/Throat: No oropharyngeal exudate.   Eyes: EOM are normal. No scleral icterus.   Neck: Neck supple. No JVD present.   Cardiovascular: Normal rate, regular rhythm and normal heart sounds. Exam reveals no gallop and no friction rub.   No murmur heard.  No RV heave.   Pulmonary/Chest: Effort normal. No respiratory distress. He has no wheezes. He has no rales. He exhibits no tenderness.   NC in place. Decreased breath sounds R middle to base   Abdominal: Soft. Bowel sounds are normal. He exhibits no distension. There is no tenderness.   Musculoskeletal: He exhibits no edema.   Skin: Skin is warm and dry. No erythema.   Psychiatric: He has a normal mood and affect.       Significant Labs:   ABG: No results for input(s): PH, PCO2, HCO3, POCSATURATED, BE in the last 48 hours., BMP:   Recent Labs   Lab 07/09/19  0829   *   *   K 4.2   CL 97   CO2 24   BUN 21   CREATININE 1.0   CALCIUM 8.6*   , CMP   Recent Labs   Lab 07/09/19  0829   *   K 4.2   CL 97   CO2 24   *   BUN 21   CREATININE 1.0   CALCIUM 8.6*   PROT 6.0   ALBUMIN 3.1*   BILITOT 0.5   ALKPHOS 72   AST 17   ALT 59*   ANIONGAP 9    ESTGFRAFRICA >60.0   EGFRNONAA >60.0   , CBC   Recent Labs   Lab 07/09/19  0829 07/09/19  0834   WBC 25.77*  --    HGB 13.7*  --    HCT 42.4 43   *  --    , INR   Recent Labs   Lab 07/09/19  0829   INR 1.0    and Troponin   Recent Labs   Lab 07/09/19  0829   TROPONINI 0.008       Significant Imaging: Echocardiogram: 2D echo with color flow doppler: No results found for this or any previous visit.    Assessment and Plan:     Acute pulmonary embolism  73M with likely newly diagnosed metastatic lung CA w/ hemorrhagic brain metastases and recent ICH, chronic LLE DVT, recent RLE superficial vein thrombosis here with pleuritic R sided chest pain. CT with bilateral PE, involving left upper and lower lobes, possibly distal RLL. Unfortunately with poor prognosis and carries absolute contraindication for anticoagulation as well as lytics if they were indicated. No evidence of RV strain (normal Tn, EKG NSR, RV normal in size on CT); normal to low BP, normal HR.  Hypoxic but also in setting of increased consolidation/GGO concerning for post-obstructive PNA. Thus unfortunately, limited to supportive care. Given R sided localization, pain may be more related to increased consolidation though certainly may be predominantly PE. Additionally, given relatively distal emboli without e/o RV strain or tachycardia, consider worsening R lung findings as possible source of hypoxia in addition to multifocal PE.   Agree with IV abx. Long-standing chronic DVT unlikely to have mobilized but could consider extension/embolization of recently seen R superficial vein thrombosis. Would consider repeat lower extremity DVT U/S. If significant DVT seen with high risk of embolization, could then consider IVC filter with IR or IC, if consistent with patient's goals of care. He and family voiced understanding of the plan and interest in pursuing IVC filter if warranted.         Seen and discussed with Dr. Andrade.    Chris Watson,  MD  Cardiology   [facility]

## 2019-07-09 NOTE — MEDICAL/APP STUDENT
"  History     Chief Complaint   Patient presents with    Chest Pain     hiccups since 3pm yesterday and Chest pain started at 9pm last night. NSR on EKG per EMS.  recently diagnosed with brain Cancer. on chemo     Mr. Jeffery Reyna is a 72yo male with HTN and cancer most likely lung in origin with mets to the liver and brain, presenting with hiccups and right chest pain for 12 hours. The hiccups have been sporadically occurring over the last week, but this is the longest they have occurred and the first time with pain. The chest pain is right mid-axillary line in around 5-6th ribs and feels like "cramps." It only occurs with the hiccups. The hiccups can be triggered by deep breathing. He has tried drinking water to relieve them with no success, but no pharmacotherapy. He was hospitalized earlier this month after the hemorrhagic brain lesions were found and started on dexamethasone. He has continued dex since discharge on 7/3 and had been told this was causing his hiccups. He also started brain radiation on . He has had infrequent mild headaches. His family also reports that he struggles with constipation and gas. No fevers, appetite change, nausea, diarrhea, cough, shortness of breath or urinary symptoms.          Past Medical History:   Diagnosis Date    Cancer     DVT (deep venous thrombosis)     Hypertension        History reviewed. No pertinent surgical history.    Family History   Problem Relation Age of Onset    Diabetes Mother     Thyroid disease Mother     Cancer Mother     Hypertension Sister        Social History     Tobacco Use    Smoking status: Former Smoker     Packs/day: 1.00     Years: 55.00     Pack years: 55.00     Types: Cigarettes     Last attempt to quit: 2019     Years since quittin.0    Smokeless tobacco: Never Used   Substance Use Topics    Alcohol use: Yes     Comment: daily "6 beers"    Drug use: No       Review of Systems   Constitutional: Negative for appetite change " "and fever.   Respiratory: Negative for cough.    Cardiovascular: Positive for chest pain. Negative for palpitations.   Gastrointestinal: Positive for constipation. Negative for abdominal pain, diarrhea, nausea and vomiting.       Physical Exam   /66 (BP Location: Right arm, Patient Position: Sitting)   Pulse 64   Temp 96.6 °F (35.9 °C) (Oral)   Resp 16   Ht 5' 11" (1.803 m)   Wt 83.9 kg (185 lb)   SpO2 96%   BMI 25.80 kg/m²     Physical Exam    ED Course         "

## 2019-07-09 NOTE — ASSESSMENT & PLAN NOTE
73M with likely newly diagnosed metastatic lung CA w/ hemorrhagic brain metastases and recent ICH, chronic LLE DVT, recent RLE superficial vein thrombosis here with pleuritic R sided chest pain. CT with bilateral PE, involving left upper and lower lobes, possibly distal RLL. Unfortunately with poor prognosis and carries absolute contraindication for anticoagulation as well as lytics if they were indicated. No evidence of RV strain (normal Tn, EKG NSR, RV normal in size on CT); normal to low BP, normal HR.  Hypoxic but also in setting of increased consolidation/GGO concerning for post-obstructive PNA. Thus unfortunately, limited to supportive care. Given R sided localization, pain may be more related to increased consolidation though certainly may be predominantly PE. Additionally, given relatively distal emboli without e/o RV strain or tachycardia, consider worsening R lung findings as possible source of hypoxia in addition to multifocal PE.   Agree with IV abx. Long-standing chronic DVT unlikely to have mobilized but could consider extension/embolization of recently seen R superficial vein thrombosis. Would consider repeat lower extremity DVT U/S. If significant DVT seen with high risk of embolization, could then consider IVC filter with IR or IC, if consistent with patient's goals of care. He and family voiced understanding of the plan and interest in pursuing IVC filter if warranted.

## 2019-07-09 NOTE — H&P
[facility] Hospital Medicine  History & Physical    Patient Name: Jeffery Reyna  MRN: [id number]  Admission Date: 7/9/2019  Attending Physician: Tatyana Sandoval MD   Primary Care Provider: Flori Harmon MD    [facility] Medicine Team: [facility] HOSP MED 5 Terry Quintana MD     Patient information was obtained from patient, parent and ER records.     Subjective:     Principal Problem:<principal problem not specified>    Chief Complaint:   Chief Complaint   Patient presents with    Chest Pain     hiccups since 3pm yesterday and Chest pain started at 9pm last night. NSR on EKG per EMS.  recently diagnosed with brain Cancer. on chemo        HPI: 73 year old with history of HTN, metastatic lung cancer (brain and liver) who presented due to chest pain. Patient reports he has had hiccups for the past week and feels as if this is causing his pain. The pain is right and mid axillary. Worse with inspiration. However, this morning patient had an episode of hemoptysis so he came to the ED. CTA in the ED: Pulmonary thromboembolism involving the left upper and left lower lobes, with questionable distal embolus involving the right lower lobe. interval increase of RUL consolidation (compared to 6/29 CT) to include the RUL, RML, RLL; could represent post obstructive effect vs infection vs spread of malignancy. CT head showed resolving intraparenchymal hemorrhages.He denies fever, n/v, cough, SOB, diarrhea or constipation.     He was recently admitted on 6/29 with headaches and N/V as his presenting symptoms. He was found to have a right lung lobe lesion suspicious for lung CA with brain and liver mets. He was seen by NSG but no surgical intervention. He was started on dexamethasone (cont till 7/12) taper but not no keppra. He is scheduled to get IR lung biopsy as an outpatient. Oncology was consulted, as was rad onc, and he started radiation therapy on 7/5.    Past Medical History:   Diagnosis Date    Cancer     DVT  "(deep venous thrombosis)     Hypertension        History reviewed. No pertinent surgical history.    Review of patient's allergies indicates:  No Known Allergies    No current facility-administered medications on file prior to encounter.      Current Outpatient Medications on File Prior to Encounter   Medication Sig    amLODIPine (NORVASC) 10 MG tablet Take 1 tablet (10 mg total) by mouth once daily.    dexAMETHasone (DECADRON) 2 MG tablet Take 1 tablet (2 mg total) by mouth 2 (two) times daily with meals. Take 4mg q 6h x 3 days, then 4mg q 8h x 3 days, then 4mg q 12h x 3 days, then 2mg q 8h x 3 days, then 2mg q 12h x 3 days, then 2mg daily x 6 days, then stop. for 10 days    famotidine (PEPCID) 20 MG tablet Take 1 tablet (20 mg total) by mouth once daily.    ondansetron (ZOFRAN-ODT) 4 MG TbDL Take 1 tablet (4 mg total) by mouth every 6 (six) hours as needed (nausea).    oxyCODONE (ROXICODONE) 5 MG immediate release tablet Take 1 tablet (5 mg total) by mouth every 6 (six) hours as needed.    acetaminophen (TYLENOL) 325 MG tablet Take 2 tablets (650 mg total) by mouth every 6 (six) hours as needed (temp greater than 99.5 F).     Family History     Problem Relation (Age of Onset)    Cancer Mother    Diabetes Mother    Hypertension Sister    Thyroid disease Mother        Tobacco Use    Smoking status: Former Smoker     Packs/day: 1.00     Years: 55.00     Pack years: 55.00     Types: Cigarettes     Last attempt to quit: 2019     Years since quittin.0    Smokeless tobacco: Never Used   Substance and Sexual Activity    Alcohol use: Yes     Comment: daily "6 beers"    Drug use: No    Sexual activity: Not on file     Review of Systems   Constitutional: Negative for appetite change, chills and fever.   HENT: Negative for congestion and rhinorrhea.    Eyes: Negative for photophobia.   Respiratory: Negative for shortness of breath.         Hemoptysis   Cardiovascular: Positive for chest pain. Negative for " palpitations.   Gastrointestinal: Negative for abdominal pain, constipation and diarrhea.   Genitourinary: Negative for difficulty urinating and hematuria.   Musculoskeletal: Negative for arthralgias and myalgias.   Skin: Negative for rash.   Neurological: Negative for seizures and headaches.   Psychiatric/Behavioral: Negative for agitation, behavioral problems and confusion.     Objective:     Vital Signs (Most Recent):  Temp: 96.6 °F (35.9 °C) (07/09/19 0802)  Pulse: 79 (07/09/19 1701)  Resp: (!) 22 (07/09/19 1432)  BP: 103/65 (07/09/19 1701)  SpO2: (!) 91 % (07/09/19 1701) Vital Signs (24h Range):  Temp:  [96.6 °F (35.9 °C)] 96.6 °F (35.9 °C)  Pulse:  [62-84] 79  Resp:  [16-26] 22  SpO2:  [85 %-98 %] 91 %  BP: ()/(59-66) 103/65     Weight: 83.9 kg (185 lb)  Body mass index is 25.8 kg/m².    Physical Exam   Constitutional: He is oriented to person, place, and time. He appears well-developed.   cachetic    HENT:   Head: Normocephalic and atraumatic.   Eyes: EOM are normal.   Neck: Normal range of motion. Neck supple.   Cardiovascular: Normal rate and regular rhythm.   Complaining of right sided chest pain, mid axillary, no tenderness to palpation    Pulmonary/Chest: Effort normal. He has no wheezes. He has no rales.   Poor respiratory effort, shallow breaths, in no acute resp distress   Abdominal: Soft. Bowel sounds are normal.   Neurological: He is alert and oriented to person, place, and time.   Skin: Skin is warm and dry.         CRANIAL NERVES     CN III, IV, VI   Extraocular motions are normal.        Significant Labs:   CBC:   Recent Labs   Lab 07/09/19  0829 07/09/19 0834   WBC 25.77*  --    HGB 13.7*  --    HCT 42.4 43   *  --      CMP:   Recent Labs   Lab 07/09/19  0829   *   K 4.2   CL 97   CO2 24   *   BUN 21   CREATININE 1.0   CALCIUM 8.6*   PROT 6.0   ALBUMIN 3.1*   BILITOT 0.5   ALKPHOS 72   AST 17   ALT 59*   ANIONGAP 9   EGFRNONAA >60.0     Troponin:   Recent Labs   Lab  07/09/19  0829   TROPONINI 0.008       Significant Imaging: I have reviewed all pertinent imaging results/findings within the past 24 hours.    Assessment/Plan:     Acute pulmonary embolism  Multiple episodes of hemoptysis and right sided chest pain on admission   CTA: Pulmonary thromboembolism involving the left upper and left lower lobes, with questionable distal embolus involving the right lower lobe.  - anticoagulation not an option in setting of recent hemorraghic brain mets  - sating 97% on 2L NC, no respiratory distress  - will continue to monitor       Metastatic lung cancer (metastasis from lung to other site)  Chest CT 6/29: Large spiculated mass within the inferior aspect of the right upper lobe worrisome for primary lung malignancy. Multiple enhancing liver lesions  CTA 7/9: interval increase in patchy ground-glass/consolidative opacity about the mass, to involve the right upper, right middle, and right lower lobes.  Differential would include post obstructive effect, with infection or spread of malignancy not excluded.  CT head: Resolving intraparenchymal hemorrhages as above with decreasing size mass mass effect and surrounding vasogenic edema.  - During last admission on 6/29: NSG but no surgical intervention. started on dexamethasone (cont till 7/12) taper but not no keppra. He is scheduled to get IR lung biopsy as an outpatient. Oncology was consulted, as was rad onc, and he started radiation therapy on 7/5.  - Goals of care discussed with family. Will have hospice rep and team speak with patient tomorrow about possible hospice care  - pain control with morphine 3mg q4hr PRN      Hyperglycemia   on admission  - not on any home DM meds  - will start 5u aspart with meals and 10u detemir qD + SSI  - continue to monitor       Discharge planning issues  Patient lives at home with family. May need hospice referral before discharge       Pneumonia, suspected post obstructive  CTA suspicious for post  obstructive pneumonia  - will start ceftriaxone 1g q12h  - BC collected          Essential hypertension  - BP stable  - holding home amlodipine. Will restart if needed        VTE Risk Mitigation (From admission, onward)        Ordered     IP VTE HIGH RISK PATIENT  Once      07/09/19 0783             Terry Quintana MD  Department of Hospital Medicine   [facility]

## 2019-07-09 NOTE — SUBJECTIVE & OBJECTIVE
"Past Medical History:   Diagnosis Date    Cancer     DVT (deep venous thrombosis)     Hypertension        History reviewed. No pertinent surgical history.    Review of patient's allergies indicates:  No Known Allergies    No current facility-administered medications on file prior to encounter.      Current Outpatient Medications on File Prior to Encounter   Medication Sig    amLODIPine (NORVASC) 10 MG tablet Take 1 tablet (10 mg total) by mouth once daily.    dexAMETHasone (DECADRON) 2 MG tablet Take 1 tablet (2 mg total) by mouth 2 (two) times daily with meals. Take 4mg q 6h x 3 days, then 4mg q 8h x 3 days, then 4mg q 12h x 3 days, then 2mg q 8h x 3 days, then 2mg q 12h x 3 days, then 2mg daily x 6 days, then stop. for 10 days    famotidine (PEPCID) 20 MG tablet Take 1 tablet (20 mg total) by mouth once daily.    ondansetron (ZOFRAN-ODT) 4 MG TbDL Take 1 tablet (4 mg total) by mouth every 6 (six) hours as needed (nausea).    oxyCODONE (ROXICODONE) 5 MG immediate release tablet Take 1 tablet (5 mg total) by mouth every 6 (six) hours as needed.    acetaminophen (TYLENOL) 325 MG tablet Take 2 tablets (650 mg total) by mouth every 6 (six) hours as needed (temp greater than 99.5 F).     Family History     Problem Relation (Age of Onset)    Cancer Mother    Diabetes Mother    Hypertension Sister    Thyroid disease Mother        Tobacco Use    Smoking status: Former Smoker     Packs/day: 1.00     Years: 55.00     Pack years: 55.00     Types: Cigarettes     Last attempt to quit: 2019     Years since quittin.0    Smokeless tobacco: Never Used   Substance and Sexual Activity    Alcohol use: Yes     Comment: daily "6 beers"    Drug use: No    Sexual activity: Not on file     Review of Systems   Constitutional: Negative for appetite change, chills and fever.   HENT: Negative for congestion and rhinorrhea.    Eyes: Negative for photophobia.   Respiratory: Negative for shortness of breath.         " Hemoptysis   Cardiovascular: Positive for chest pain. Negative for palpitations.   Gastrointestinal: Negative for abdominal pain, constipation and diarrhea.   Genitourinary: Negative for difficulty urinating and hematuria.   Musculoskeletal: Negative for arthralgias and myalgias.   Skin: Negative for rash.   Neurological: Negative for seizures and headaches.   Psychiatric/Behavioral: Negative for agitation, behavioral problems and confusion.     Objective:     Vital Signs (Most Recent):  Temp: 96.6 °F (35.9 °C) (07/09/19 0802)  Pulse: 79 (07/09/19 1701)  Resp: (!) 22 (07/09/19 1432)  BP: 103/65 (07/09/19 1701)  SpO2: (!) 91 % (07/09/19 1701) Vital Signs (24h Range):  Temp:  [96.6 °F (35.9 °C)] 96.6 °F (35.9 °C)  Pulse:  [62-84] 79  Resp:  [16-26] 22  SpO2:  [85 %-98 %] 91 %  BP: ()/(59-66) 103/65     Weight: 83.9 kg (185 lb)  Body mass index is 25.8 kg/m².    Physical Exam   Constitutional: He is oriented to person, place, and time. He appears well-developed.   cachetic    HENT:   Head: Normocephalic and atraumatic.   Eyes: EOM are normal.   Neck: Normal range of motion. Neck supple.   Cardiovascular: Normal rate and regular rhythm.   Complaining of right sided chest pain, mid axillary, no tenderness to palpation    Pulmonary/Chest: Effort normal. He has no wheezes. He has no rales.   Poor respiratory effort, shallow breaths, in no acute resp distress   Abdominal: Soft. Bowel sounds are normal.   Neurological: He is alert and oriented to person, place, and time.   Skin: Skin is warm and dry.         CRANIAL NERVES     CN III, IV, VI   Extraocular motions are normal.        Significant Labs:   CBC:   Recent Labs   Lab 07/09/19  0829 07/09/19  0834   WBC 25.77*  --    HGB 13.7*  --    HCT 42.4 43   *  --      CMP:   Recent Labs   Lab 07/09/19  0829   *   K 4.2   CL 97   CO2 24   *   BUN 21   CREATININE 1.0   CALCIUM 8.6*   PROT 6.0   ALBUMIN 3.1*   BILITOT 0.5   ALKPHOS 72   AST 17   ALT 59*    ANIONGAP 9   EGFRNONAA >60.0     Troponin:   Recent Labs   Lab 07/09/19  0829   TROPONINI 0.008       Significant Imaging: I have reviewed all pertinent imaging results/findings within the past 24 hours.

## 2019-07-09 NOTE — HOSPITAL COURSE
73 year old with metastatic lung cancer (hemorragic mets to brain and liver) who presented due to chest pain and hemoptysis. CTA showed multiple PE which are the likely source of his symtpoms. He also had an increase in RUL consolidation (compared to CT from 6/29) that now includes RML and RLL. DDX: post obstructive effect vs malignancy spread vs infection. Patient started on ceftriaxone for pnx coverage. CT head with resolving hemorraghic lesions. He was admitted overnight to discuss goals of care with hospice the following day. Symptomatic treatment for pain overnight with morphine PRN. PE treatment held setting of hemorraghic brain mets. Patient and family agreed on home hospice care. He was accepted to hospice and discharged home. At, time of discharge he was not continued on abx tx for pneumonia since he had no signs/symptoms of infection and symptoms likely due to lung injury from PE and/or malignancy spread.

## 2019-07-09 NOTE — ASSESSMENT & PLAN NOTE
Multiple episodes of hemoptysis and right sided chest pain on admission   CTA: Pulmonary thromboembolism involving the left upper and left lower lobes, with questionable distal embolus involving the right lower lobe.  - anticoagulation not an option in setting of recent hemorraghic brain mets  - sating 97% on 2L NC, no respiratory distress  - will continue to monitor

## 2019-07-09 NOTE — ED TRIAGE NOTES
Patient presents via [facility] for chest pain and hiccups that started yesterday. Patient is currently being treated with radiation for stage 4 cancer. Patient is aaox4. Patient states that the pain is a 8/10. Patients last radiation treatment was yesterday, next is today at 1430.

## 2019-07-09 NOTE — ED NOTES
"Patient is awake, alert and oriented x 3. Patients respirations even and unlabored. Patient has no complaints at this time. Patient updated on plan of care at this time.  Patient states his pain is much better and the morphine "worked wonders" Cardiac/pulse ox/bp monitor on patient with alarms set. Side rails x 2. Will continue to monitor at this time.   "

## 2019-07-10 VITALS
TEMPERATURE: 99 F | OXYGEN SATURATION: 88 % | RESPIRATION RATE: 20 BRPM | SYSTOLIC BLOOD PRESSURE: 117 MMHG | BODY MASS INDEX: 28.58 KG/M2 | HEART RATE: 88 BPM | DIASTOLIC BLOOD PRESSURE: 75 MMHG | WEIGHT: 204.13 LBS | HEIGHT: 71 IN

## 2019-07-10 LAB
ANION GAP SERPL CALC-SCNC: 14 MMOL/L (ref 8–16)
ANISOCYTOSIS BLD QL SMEAR: SLIGHT
BASOPHILS # BLD AUTO: 0.04 K/UL (ref 0–0.2)
BASOPHILS NFR BLD: 0.3 % (ref 0–1.9)
BUN SERPL-MCNC: 27 MG/DL (ref 8–23)
CALCIUM SERPL-MCNC: 9.1 MG/DL (ref 8.7–10.5)
CHLORIDE SERPL-SCNC: 103 MMOL/L (ref 95–110)
CO2 SERPL-SCNC: 18 MMOL/L (ref 23–29)
CREAT SERPL-MCNC: 1.2 MG/DL (ref 0.5–1.4)
DIFFERENTIAL METHOD: ABNORMAL
EOSINOPHIL # BLD AUTO: 0 K/UL (ref 0–0.5)
EOSINOPHIL NFR BLD: 0.1 % (ref 0–8)
ERYTHROCYTE [DISTWIDTH] IN BLOOD BY AUTOMATED COUNT: 15.3 % (ref 11.5–14.5)
EST. GFR  (AFRICAN AMERICAN): >60 ML/MIN/1.73 M^2
EST. GFR  (NON AFRICAN AMERICAN): >60 ML/MIN/1.73 M^2
GLUCOSE SERPL-MCNC: 131 MG/DL (ref 70–110)
HCT VFR BLD AUTO: 45.2 % (ref 40–54)
HGB BLD-MCNC: 15 G/DL (ref 14–18)
IMM GRANULOCYTES # BLD AUTO: 0.14 K/UL (ref 0–0.04)
IMM GRANULOCYTES NFR BLD AUTO: 0.9 % (ref 0–0.5)
LYMPHOCYTES # BLD AUTO: 0.6 K/UL (ref 1–4.8)
LYMPHOCYTES NFR BLD: 4.1 % (ref 18–48)
MAGNESIUM SERPL-MCNC: 2.2 MG/DL (ref 1.6–2.6)
MCH RBC QN AUTO: 28.8 PG (ref 27–31)
MCHC RBC AUTO-ENTMCNC: 33.2 G/DL (ref 32–36)
MCV RBC AUTO: 87 FL (ref 82–98)
MONOCYTES # BLD AUTO: 0.4 K/UL (ref 0.3–1)
MONOCYTES NFR BLD: 2.4 % (ref 4–15)
NEUTROPHILS # BLD AUTO: 14.4 K/UL (ref 1.8–7.7)
NEUTROPHILS NFR BLD: 92.2 % (ref 38–73)
NRBC BLD-RTO: 0 /100 WBC
PHOSPHATE SERPL-MCNC: 4.3 MG/DL (ref 2.7–4.5)
PLATELET # BLD AUTO: 330 K/UL (ref 150–350)
PLATELET BLD QL SMEAR: ABNORMAL
PMV BLD AUTO: 9.9 FL (ref 9.2–12.9)
POCT GLUCOSE: 141 MG/DL (ref 70–110)
POCT GLUCOSE: 153 MG/DL (ref 70–110)
POCT GLUCOSE: 183 MG/DL (ref 70–110)
POTASSIUM SERPL-SCNC: 5.2 MMOL/L (ref 3.5–5.1)
RBC # BLD AUTO: 5.21 M/UL (ref 4.6–6.2)
SODIUM SERPL-SCNC: 135 MMOL/L (ref 136–145)
WBC # BLD AUTO: 15.63 K/UL (ref 3.9–12.7)

## 2019-07-10 PROCEDURE — 80048 BASIC METABOLIC PNL TOTAL CA: CPT

## 2019-07-10 PROCEDURE — 99239 HOSP IP/OBS DSCHRG MGMT >30: CPT | Mod: GC,,, | Performed by: HOSPITALIST

## 2019-07-10 PROCEDURE — 83735 ASSAY OF MAGNESIUM: CPT

## 2019-07-10 PROCEDURE — 84100 ASSAY OF PHOSPHORUS: CPT

## 2019-07-10 PROCEDURE — 36415 COLL VENOUS BLD VENIPUNCTURE: CPT

## 2019-07-10 PROCEDURE — 99239 PR HOSPITAL DISCHARGE DAY,>30 MIN: ICD-10-PCS | Mod: GC,,, | Performed by: HOSPITALIST

## 2019-07-10 PROCEDURE — 85025 COMPLETE CBC W/AUTO DIFF WBC: CPT

## 2019-07-10 PROCEDURE — 63600175 PHARM REV CODE 636 W HCPCS: Performed by: STUDENT IN AN ORGANIZED HEALTH CARE EDUCATION/TRAINING PROGRAM

## 2019-07-10 PROCEDURE — 25000003 PHARM REV CODE 250: Performed by: STUDENT IN AN ORGANIZED HEALTH CARE EDUCATION/TRAINING PROGRAM

## 2019-07-10 RX ORDER — MORPHINE SULFATE 2 MG/ML
2 INJECTION, SOLUTION INTRAMUSCULAR; INTRAVENOUS EVERY 6 HOURS PRN
Status: DISCONTINUED | OUTPATIENT
Start: 2019-07-10 | End: 2019-07-11 | Stop reason: HOSPADM

## 2019-07-10 RX ORDER — OXYCODONE HYDROCHLORIDE 10 MG/1
10 TABLET ORAL EVERY 6 HOURS PRN
Status: DISCONTINUED | OUTPATIENT
Start: 2019-07-10 | End: 2019-07-11 | Stop reason: HOSPADM

## 2019-07-10 RX ADMIN — INSULIN ASPART 5 UNITS: 100 INJECTION, SOLUTION INTRAVENOUS; SUBCUTANEOUS at 04:07

## 2019-07-10 RX ADMIN — MORPHINE SULFATE 3 MG: 2 INJECTION, SOLUTION INTRAMUSCULAR; INTRAVENOUS at 06:07

## 2019-07-10 RX ADMIN — FAMOTIDINE 20 MG: 20 TABLET, FILM COATED ORAL at 09:07

## 2019-07-10 RX ADMIN — CEFTRIAXONE SODIUM 1 G: 1 INJECTION, POWDER, FOR SOLUTION INTRAMUSCULAR; INTRAVENOUS at 06:07

## 2019-07-10 RX ADMIN — MORPHINE SULFATE 3 MG: 2 INJECTION, SOLUTION INTRAMUSCULAR; INTRAVENOUS at 01:07

## 2019-07-10 RX ADMIN — DEXAMETHASONE 2 MG: 1 TABLET ORAL at 04:07

## 2019-07-10 RX ADMIN — DEXAMETHASONE 2 MG: 1 TABLET ORAL at 09:07

## 2019-07-10 RX ADMIN — INSULIN ASPART 5 UNITS: 100 INJECTION, SOLUTION INTRAVENOUS; SUBCUTANEOUS at 12:07

## 2019-07-10 RX ADMIN — SODIUM CHLORIDE, SODIUM LACTATE, POTASSIUM CHLORIDE, AND CALCIUM CHLORIDE 1000 ML: .6; .31; .03; .02 INJECTION, SOLUTION INTRAVENOUS at 07:07

## 2019-07-10 NOTE — DISCHARGE SUMMARY
[facility] Hospital Medicine  Discharge Summary      Patient Name: Jeffery Reyna  MRN: [id number]  Admission Date: 7/9/2019  Hospital Length of Stay: 1 days  Discharge Date and Time:  07/10/2019 4:25 PM  Attending Physician: Tatyana Sandoval MD   Discharging Provider: Terry Quintana MD  Primary Care Provider: Flori Harmon MD  [facility] Medicine Team: [facility]P MED 5 Terry Quintana MD    HPI:   73 year old with history of HTN, metastatic lung cancer (brain and liver) who presented due to chest pain. Patient reports he has had hiccups for the past week and feels as if this is causing his pain. The pain is right and mid axillary. Worse with inspiration. However, this morning patient had an episode of hemoptysis so he came to the ED. CTA in the ED: Pulmonary thromboembolism involving the left upper and left lower lobes, with questionable distal embolus involving the right lower lobe. interval increase of RUL consolidation (compared to 6/29 CT) to include the RUL, RML, RLL; could represent post obstructive effect vs infection vs spread of malignancy. CT head showed resolving intraparenchymal hemorrhages.He denies fever, n/v, cough, SOB, diarrhea or constipation.     He was recently admitted on 6/29 with headaches and N/V as his presenting symptoms. He was found to have a right lung lobe lesion suspicious for lung CA with brain and liver mets. He was seen by NSG but no surgical intervention. He was started on dexamethasone (cont till 7/12) taper but not no keppra. He is scheduled to get IR lung biopsy as an outpatient. Oncology was consulted, as was rad onc, and he started radiation therapy on 7/5.    * No surgery found *      Hospital Course:   73 year old with metastatic lung cancer (hemorragic mets to brain and liver) who presented due to chest pain and hemoptysis. CTA showed multiple PE which are the likely source of his symtpoms. He also had an increase in RUL consolidation (compared to CT from 6/29)  that now includes RML and RLL. DDX: post obstructive effect vs malignancy spread vs infection. Patient started on ceftriaxone for pnx coverage. CT head with resolving hemorraghic lesions. He was admitted overnight to discuss goals of care with hospice the following day. Symptomatic treatment for pain overnight with morphine PRN. PE treatment held setting of hemorraghic brain mets. Patient and family agreed on home hospice care. He was accepted to hospice and discharged home. At, time of discharge he was not continued on abx tx for pneumonia since he had no signs/symptoms of infection and symptoms likely due to lung injury from PE and/or malignancy spread.      Vitals:    07/10/19 1535   BP:    Pulse: 88   Resp:    Temp:    Physical Exam   Constitutional: No distress.   cachectic   HENT:   Head: Atraumatic.   Mouth/Throat: No oropharyngeal exudate.   Eyes: EOM are normal. No scleral icterus.   Neck: Neck supple. No JVD present.   Cardiovascular: Normal rate, regular rhythm and normal heart sounds. Exam reveals no gallop and no friction rub.   No murmur heard.  Pulmonary/Chest: Effort normal. No respiratory distress. He has no wheezes. He has no rales. He exhibits no tenderness.   NC in place. Decreased breath sounds R middle to base   Abdominal: Soft. Bowel sounds are normal. He exhibits no distension. There is no tenderness.   Musculoskeletal: He exhibits no edema.   Skin: Skin is warm and dry. No erythema.   Psychiatric: He has a normal mood and affect.     Consults:   Consults (From admission, onward)        Status Ordering Provider     Inpatient consult to Cardiology  Once     Provider:  (Not yet assigned)    [facility] [place] new Assessment & Plan notes have been filed under this hospital service since the last note was generated.  Service: Hospital Medicine    Final Active Diagnoses:    Diagnosis Date Noted POA    PRINCIPAL PROBLEM:  Acute pulmonary embolism [I26.99] 07/09/2019 Yes     Metastatic lung cancer (metastasis from lung to other site) [C34.90] 07/09/2019 Yes    Pneumonia, suspected post obstructive [J18.9] 07/09/2019 Yes    Discharge planning issues [Z02.9] 07/09/2019 Not Applicable    Hyperglycemia [R73.9] 07/09/2019 Yes    Brain metastasis [C79.31] 06/29/2019 Yes    Essential hypertension [I10] 11/04/2013 Yes      Problems Resolved During this Admission:       Discharged Condition: stable    Disposition: Home or Self Care    Follow Up:  Follow-up Information     [facility].    Specialty:  Hospice Services  Contact information:  [address]  [address]  [place] [address]  [phone]                 Patient Instructions:   No discharge procedures on file.    Significant Diagnostic Studies: Labs:   CMP   Recent Labs   Lab 07/09/19  0829 07/10/19  0335   * 135*   K 4.2 5.2*   CL 97 103   CO2 24 18*   * 131*   BUN 21 27*   CREATININE 1.0 1.2   CALCIUM 8.6* 9.1   PROT 6.0  --    ALBUMIN 3.1*  --    BILITOT 0.5  --    ALKPHOS 72  --    AST 17  --    ALT 59*  --    ANIONGAP 9 14   ESTGFRAFRICA >60.0 >60.0   EGFRNONAA >60.0 >60.0    and CBC   Recent Labs   Lab 07/09/19  0829  07/10/19  0335   WBC 25.77*  --  15.63*   HGB 13.7*  --  15.0   HCT 42.4   < > 45.2   *  --  330    < > = values in this interval not displayed.     Radiology: CT scan: CTA with multiple PEs. interval increase in consolidative opacity of right lung indicating spread of malainancy vs post obstuctive effect    Pending Diagnostic Studies:     None         Medications:  Reconciled Home Medications:      Medication List      CONTINUE taking these medications    acetaminophen 325 MG tablet  Commonly known as:  TYLENOL  Take 2 tablets (650 mg total) by mouth every 6 (six) hours as needed (temp greater than 99.5 F).     famotidine 20 MG tablet  Commonly known as:  PEPCID  Take 1 tablet (20 mg total) by mouth once daily.     ondansetron 4 MG Tbdl  Commonly known as:  ZOFRAN-ODT  Take 1 tablet (4  mg total) by mouth every 6 (six) hours as needed (nausea).     oxyCODONE 5 MG immediate release tablet  Commonly known as:  ROXICODONE  Take 1 tablet (5 mg total) by mouth every 6 (six) hours as needed.        STOP taking these medications    amLODIPine 10 MG tablet  Commonly known as:  NORVASC     dexAMETHasone 2 MG tablet  Commonly known as:  DECADRON            Indwelling Lines/Drains at time of discharge:   Lines/Drains/Airways          None          Time spent on the discharge of patient: 20 minutes  Patient was seen and examined on the date of discharge and determined to be suitable for discharge.         Terry Quintana MD  Department of Hospital Medicine  [facility]

## 2019-07-10 NOTE — PLAN OF CARE
[facility]  Department of Hospital Medicine  [address] [place] [address]  [phone] after hours  [phone] fax    HOSPICE  ORDERS    07/10/2019    Admit to Hospice:  Home Service     Diagnoses:   Active Hospital Problems    Diagnosis  POA    *Acute pulmonary embolism [I26.99]  Yes    Metastatic lung cancer (metastasis from lung to other site) [C34.90]  Yes    Pneumonia, suspected post obstructive [J18.9]  Yes    Discharge planning issues [Z02.9]  Not Applicable    Hyperglycemia [R73.9]  Yes    Brain metastasis [C79.31]  Yes    Essential hypertension [I10]  Yes      Resolved Hospital Problems   No resolved problems to display.       Hospice Qualifying Diagnoses: Metastatic Lung cancer with hemorraghic mets to the Brain.        Patient has a life expectancy < 6 months due to:  1) Primary Hospice Diagnosis: Metastatic Lung Cancer   2) Comorbid Conditions Contributing to Decline: Pulmonary Emboli, HTN, Respiratory failure    Vital Signs: Routine per Hospice Protocol.    Code Status: DNR    Allergies: Review of patient's allergies indicates:  No Known Allergies    Diet: Regular Diet    Activities: As tolerated    Nursing: Per Hospice Routine.      Routine Skin for Bedridden Patients: Apply moisture barrier cream to all skin folds and   wet areas in perineal area daily and after baths and all bowel movements.    Oxygen: Please keep oxygen saturation between 88-92% or whatever is comfortable for the patient.     Medications:    Jeffery Reyna   Home Medication Instructions [place]:[id number]    Printed on:07/10/19 5776   Medication Information                      acetaminophen (TYLENOL) 325 MG tablet  Take 2 tablets (650 mg total) by mouth every 6 (six) hours as needed (temp greater than 99.5 F).             famotidine (PEPCID) 20 MG tablet  Take 1 tablet (20 mg total) by mouth once daily.             ondansetron (ZOFRAN-ODT) 4 MG TbDL  Take 1 tablet (4 mg total)  by mouth every 6 (six) hours as needed (nausea).             oxyCODONE (ROXICODONE) 5 MG immediate release tablet  Take 1 tablet (5 mg total) by mouth every 6 (six) hours as needed.                 Future Orders:  Hospice Medical Director may dictate new orders for comfortable care measures & sign death certificate.        _________________________________  Cornelio Patel MD  07/10/2019

## 2019-07-10 NOTE — PHARMACY MED REC
"  Admission Medication Reconciliation - Pharmacy Consult Note    The home medication history was taken by Deja Sanders CPhT.  Based on information gathered and subsequent review by the clinical pharmacist, the items below may need attention.    You may go to "Admission" then "Reconcile Home Medications" tabs to review and/or act upon these items.        No issues noted with the medication reconciliation.        Please address this information as you see fit.  Feel free to contact us if you have any questions or require assistance.      Zuleika Rivas, PharmD  [id number]            .    .          "

## 2019-07-10 NOTE — NURSING
IM 5 paged. Call returned by Cornelio Patel MD. MD notified of pt c/o of pain being uncontrolled with IV morphine. Morphine recently administered at 1928. MD stated will review chart and order pt pain medication. Will continue to monitor closely.

## 2019-07-10 NOTE — PLAN OF CARE
07/10/19 1034   Post-Acute Status   Post-Acute Authorization Home Health/Hospice   Home Health/Hospice Status Referrals Sent     Sw sent hospice orders via [facility] to [facility].

## 2019-07-10 NOTE — NURSING
Pt arrived to unit via stretcher by hospital transport and family accompanying pt. No acute distress noted. Pt appears fatigued. Also, moaning and complaining of abdominal pain. Pt transferred over to the bed with assistance. Pt oriented to room. Will continue to monitor closely.

## 2019-07-10 NOTE — PLAN OF CARE
CM met with Patient's Significant other, Davis, sister Rowena and Hany from [facility]. Discussed goals of care. Family wishes for patient to discharge home today with [facility] services. Hospice company to deliver equipment at 430pm to patient's home. Once portable tank arrives Significant other and sister will return to hospital with Oxygen to transport patient home. Spoke with Dr. Patel to place discharge orders for patient. Will continue to follow.

## 2019-07-12 NOTE — PLAN OF CARE
Problem: Adult Inpatient Plan of Care  Goal: Plan of Care Review  Outcome: Outcome(s) achieved Date Met: 07/08/19  Last day of inpatient XRT to the brain. Pt to be discharged to hospice.

## 2019-07-14 LAB
BACTERIA BLD CULT: NORMAL
BACTERIA BLD CULT: NORMAL

## 2019-07-15 ENCOUNTER — TELEPHONE (OUTPATIENT)
Dept: RADIATION ONCOLOGY | Facility: CLINIC | Age: 74
End: 2019-07-15

## 2019-07-15 NOTE — TELEPHONE ENCOUNTER
----- Message from Susan Comer sent at 7/15/2019  3:25 PM CDT -----  Contact: Pt's daughter Rowena Bonner wants to know if he needs to finish his treatment. Please call at [phone].  Return call to Ms Bonner message left on answer machine

## 2019-07-15 NOTE — PLAN OF CARE
Patient discharged home to care of family and [facility] on 7/10/19     07/15/19 1706   Final Note   Assessment Type Final Discharge Note   Anticipated Discharge Disposition [facility]   What phone number can be called within the next 1-3 days to see how you are doing after discharge?   ([phone])   Hospital Follow Up  Appt(s) scheduled? Yes   Discharge plans and expectations educations in teach back method with documentation complete? Yes   Right Care Referral Info   Post Acute Recommendation Other   Referral Type Home Hospice   Facility Name [facility]

## 2019-07-16 ENCOUNTER — TELEPHONE (OUTPATIENT)
Dept: RADIATION ONCOLOGY | Facility: CLINIC | Age: 74
End: 2019-07-16

## 2019-07-16 NOTE — TELEPHONE ENCOUNTER
Received call from Mr Reyna's sister requesting that all his op appt be cancelled as he has entered Hospice

## 2019-07-16 NOTE — TELEPHONE ENCOUNTER
----- Message from Susan Comer sent at 7/16/2019 11:08 AM CDT -----  Contact: Pt's sister Rowena Bonner has some questions regarding the pt. Please call at [phone].

## 2021-11-16 NOTE — ED NOTES
The patient is awake, alert and cooperative with a calm affect, patient is aware of environment. Airway is open and patent, respirations are spontaneous, normal respiratory effort and rate noted, skin warm and dry, moves all extremities well, appearance: no apparent distress noted.  
ED resident at bedside.  
NCC at bs.   
Patient left via stretcher with EMS & family accompanying him. No c/o or distress noted at this time. VSS.   
Pt. Returned to room 4 from MRI with RN on cardiac monitor. No distress noted. Family remains at bs. Voices no concerns/needs/wants. Updated on POC. Will continue to monitor frequently, call light within reach.   
Pt. Taken upstairs with RN on cardiac monitor. No distress noted.   
Pt. To MRI with RN on cardiac monitor.   
Report given to mckinley butts  
Urinal at bedside in pt reach, pt aware of need for urine sample, unable to provide at this time, instructed to press call bell as soon as able to provide, verbalized understanding, call bell remains in pt reach. Family remains at bedside.   
Known